# Patient Record
Sex: MALE | Race: WHITE | NOT HISPANIC OR LATINO | URBAN - METROPOLITAN AREA
[De-identification: names, ages, dates, MRNs, and addresses within clinical notes are randomized per-mention and may not be internally consistent; named-entity substitution may affect disease eponyms.]

---

## 2017-12-17 ENCOUNTER — EMERGENCY (EMERGENCY)
Facility: HOSPITAL | Age: 57
LOS: 1 days | Discharge: PSYCHIATRIC OR SUBSTANCE USE FACILITY | End: 2017-12-17
Attending: EMERGENCY MEDICINE | Admitting: INTERNAL MEDICINE
Payer: MEDICAID

## 2017-12-17 VITALS
DIASTOLIC BLOOD PRESSURE: 87 MMHG | OXYGEN SATURATION: 100 % | TEMPERATURE: 98 F | HEART RATE: 86 BPM | SYSTOLIC BLOOD PRESSURE: 155 MMHG | RESPIRATION RATE: 16 BRPM

## 2017-12-17 LAB
ALBUMIN SERPL ELPH-MCNC: 3.9 G/DL — SIGNIFICANT CHANGE UP (ref 3.3–5)
ALP SERPL-CCNC: 150 U/L — HIGH (ref 40–120)
ALT FLD-CCNC: 13 U/L — SIGNIFICANT CHANGE UP (ref 4–41)
APAP SERPL-MCNC: < 15 UG/ML — LOW (ref 15–25)
AST SERPL-CCNC: 26 U/L — SIGNIFICANT CHANGE UP (ref 4–40)
BARBITURATES MEASUREMENT: NEGATIVE — SIGNIFICANT CHANGE UP
BASOPHILS # BLD AUTO: 0.03 K/UL — SIGNIFICANT CHANGE UP (ref 0–0.2)
BASOPHILS NFR BLD AUTO: 0.3 % — SIGNIFICANT CHANGE UP (ref 0–2)
BENZODIAZ SERPL-MCNC: NEGATIVE — SIGNIFICANT CHANGE UP
BILIRUB SERPL-MCNC: 0.3 MG/DL — SIGNIFICANT CHANGE UP (ref 0.2–1.2)
BUN SERPL-MCNC: 24 MG/DL — HIGH (ref 7–23)
CALCIUM SERPL-MCNC: 8.7 MG/DL — SIGNIFICANT CHANGE UP (ref 8.4–10.5)
CHLORIDE SERPL-SCNC: 103 MMOL/L — SIGNIFICANT CHANGE UP (ref 98–107)
CK MB BLD-MCNC: 2.9 — HIGH (ref 0–2.5)
CK MB BLD-MCNC: 7.64 NG/ML — HIGH (ref 1–6.6)
CK SERPL-CCNC: 267 U/L — HIGH (ref 30–200)
CO2 SERPL-SCNC: 22 MMOL/L — SIGNIFICANT CHANGE UP (ref 22–31)
CREAT SERPL-MCNC: 0.86 MG/DL — SIGNIFICANT CHANGE UP (ref 0.5–1.3)
EOSINOPHIL # BLD AUTO: 0.3 K/UL — SIGNIFICANT CHANGE UP (ref 0–0.5)
EOSINOPHIL NFR BLD AUTO: 3.1 % — SIGNIFICANT CHANGE UP (ref 0–6)
ETHANOL BLD-MCNC: < 10 MG/DL — SIGNIFICANT CHANGE UP
GLUCOSE SERPL-MCNC: 138 MG/DL — HIGH (ref 70–99)
HCT VFR BLD CALC: 51.8 % — HIGH (ref 39–50)
HGB BLD-MCNC: 17.6 G/DL — HIGH (ref 13–17)
IMM GRANULOCYTES # BLD AUTO: 0.02 # — SIGNIFICANT CHANGE UP
IMM GRANULOCYTES NFR BLD AUTO: 0.2 % — SIGNIFICANT CHANGE UP (ref 0–1.5)
LYMPHOCYTES # BLD AUTO: 2.08 K/UL — SIGNIFICANT CHANGE UP (ref 1–3.3)
LYMPHOCYTES # BLD AUTO: 21.2 % — SIGNIFICANT CHANGE UP (ref 13–44)
MCHC RBC-ENTMCNC: 29.4 PG — SIGNIFICANT CHANGE UP (ref 27–34)
MCHC RBC-ENTMCNC: 34 % — SIGNIFICANT CHANGE UP (ref 32–36)
MCV RBC AUTO: 86.6 FL — SIGNIFICANT CHANGE UP (ref 80–100)
MONOCYTES # BLD AUTO: 0.78 K/UL — SIGNIFICANT CHANGE UP (ref 0–0.9)
MONOCYTES NFR BLD AUTO: 7.9 % — SIGNIFICANT CHANGE UP (ref 2–14)
NEUTROPHILS # BLD AUTO: 6.61 K/UL — SIGNIFICANT CHANGE UP (ref 1.8–7.4)
NEUTROPHILS NFR BLD AUTO: 67.3 % — SIGNIFICANT CHANGE UP (ref 43–77)
NRBC # FLD: 0 — SIGNIFICANT CHANGE UP
PLATELET # BLD AUTO: 301 K/UL — SIGNIFICANT CHANGE UP (ref 150–400)
PMV BLD: 9.8 FL — SIGNIFICANT CHANGE UP (ref 7–13)
POTASSIUM SERPL-MCNC: 4.3 MMOL/L — SIGNIFICANT CHANGE UP (ref 3.5–5.3)
POTASSIUM SERPL-SCNC: 4.3 MMOL/L — SIGNIFICANT CHANGE UP (ref 3.5–5.3)
PROT SERPL-MCNC: 7.2 G/DL — SIGNIFICANT CHANGE UP (ref 6–8.3)
RBC # BLD: 5.98 M/UL — HIGH (ref 4.2–5.8)
RBC # FLD: 13.2 % — SIGNIFICANT CHANGE UP (ref 10.3–14.5)
SALICYLATES SERPL-MCNC: < 5 MG/DL — LOW (ref 15–30)
SODIUM SERPL-SCNC: 142 MMOL/L — SIGNIFICANT CHANGE UP (ref 135–145)
TROPONIN T SERPL-MCNC: < 0.06 NG/ML — SIGNIFICANT CHANGE UP (ref 0–0.06)
TSH SERPL-MCNC: 7.16 UIU/ML — HIGH (ref 0.27–4.2)
WBC # BLD: 9.82 K/UL — SIGNIFICANT CHANGE UP (ref 3.8–10.5)
WBC # FLD AUTO: 9.82 K/UL — SIGNIFICANT CHANGE UP (ref 3.8–10.5)

## 2017-12-17 PROCEDURE — 99285 EMERGENCY DEPT VISIT HI MDM: CPT

## 2017-12-17 RX ORDER — HALOPERIDOL DECANOATE 100 MG/ML
5 INJECTION INTRAMUSCULAR ONCE
Qty: 0 | Refills: 0 | Status: COMPLETED | OUTPATIENT
Start: 2017-12-17 | End: 2017-12-17

## 2017-12-17 RX ORDER — SODIUM CHLORIDE 9 MG/ML
1000 INJECTION INTRAMUSCULAR; INTRAVENOUS; SUBCUTANEOUS ONCE
Qty: 0 | Refills: 0 | Status: COMPLETED | OUTPATIENT
Start: 2017-12-17 | End: 2017-12-17

## 2017-12-17 RX ADMIN — HALOPERIDOL DECANOATE 5 MILLIGRAM(S): 100 INJECTION INTRAMUSCULAR at 22:21

## 2017-12-17 RX ADMIN — Medication 2 MILLIGRAM(S): at 22:22

## 2017-12-17 RX ADMIN — SODIUM CHLORIDE 1000 MILLILITER(S): 9 INJECTION INTRAMUSCULAR; INTRAVENOUS; SUBCUTANEOUS at 22:32

## 2017-12-17 NOTE — ED PROVIDER NOTE - ATTENDING CONTRIBUTION TO CARE
Martina BRITTON- 56 Y/O M brought in by ems covered in feces and rats running all over mouse after a well check call was called. Pt was decontaminated in the ED and washed , pt is agitated and refuse to answer any questions. Pt is cursing at everyone and telling female staff that" I will rape you with broomstick" . Pt is singing songs and talking to himself when nobody is around and talking to imaginary person    pt is alert, agitated, uncooperative, active hallucinations, appears psychotic, no acute distress noted, lays calmly on his side when not approached, speaking loudly in clear words, no focal deficit noted, has long dirty nails and unkept , skin warm, dry, poor turgor    will keep on enhanced observation, give 2 mg ativna and 5 mg haldol to calm him, will check labs and then possibel move to psych , appears in psychotic episode

## 2017-12-17 NOTE — ED PROVIDER NOTE - OBJECTIVE STATEMENT
57yoM BIBEMS after neighbors called for foul smell coming from his apartment. upon arrival EMS noted feces covering the walls and rat infestation of dwelling. at that point pt was brought in for a well check. pt decontaminated rick rrival as covered in feces as well. upon initial evaluation pt appears actively psychotic, responding to internal stimuli, having active hallucinations, intermittently signing songs. when approached pt starts screaming that "I freed [place]". when attempting to obtain a history pt began screaming "you are not a doctor YOU ARE NOT A DOCTOR!" unable to obtain further hx.  pt requiring ativan and haldol for evaluation.  upon reviewing old notes pt with hx schizophrenia was a [place] pt at one time.

## 2017-12-17 NOTE — ED ADULT NURSE REASSESSMENT NOTE - NS ED NURSE REASSESS COMMENT FT1
Handoff report to LOLITA Back . Handoff report to LOLITA Back . Noted quarter size area of reddeness and swelling /abraided area  mid forehead pt st" I fell into furniture." No other obvious injury noted. Skin is otherwise intact. Pt reports" I can't walk I fall a lot. I have people come help me." MD Mack notified of above. CT pending.

## 2017-12-17 NOTE — ED PROVIDER NOTE - MEDICAL DECISION MAKING DETAILS
57M w/ acute psychotic episode. psych clearance labs, psych consult, likely admission for medical management.

## 2017-12-17 NOTE — ED ADULT NURSE REASSESSMENT NOTE - NS ED NURSE REASSESS COMMENT FT1
Pt arrives to RM 18 float LOLITA Mcgee at bedside. MD Mack at bedside. Pt intermittenly screaming then singing. Pt reported to be unkempt, living in rat infested house  deplorable conditions...911 called by neighbors. Pt arrived to ED directly to Decon room . Medicated and iv access labs obtained , EKG done. will cont to assess.  Siderails up, bed low, pt on enhances observation Anabelle PCA called to bedside.

## 2017-12-17 NOTE — ED PROVIDER NOTE - SHIFT CHANGE DETAILS
pending psych eval, pt in  physically pending psych eval, pt in  physically    08:07 Valle att: Patient signed out to me. 58yo M h/o schizophrenia p/w psychosis and multiple scalp hematomas. CT head neg ich. Ua grossly positive, ok for po keflex. Patient pending [facility] admission for psychosis.

## 2017-12-17 NOTE — ED ADULT TRIAGE NOTE - CHIEF COMPLAINT QUOTE
Pt arrives to ED via EMS from home.  Pt neighbors called 911 due to odor coming from the pt's residence.  EMS reports pt residence was filled with garbage, mold, live rats walking in residence, what appeared to be fecal matter thrown against the walls.  Pt asks to be called Sasa in triage.  Pt knows his , that he is in a hospital but unaware of date stating he has not left his residence for 7 years.  Pt complains of neck pain and numbness to bilateral feet for a few years.  Pt states people stop by and give him food.  Pt reports hx of fibromyalgia and depression.  Pt denies SI/HI.  Pt calm and cooperative in triage.  Pt requires cleaning care.  Pt arrived naked under sheet.  Charge RN called. Pt arrives to ED via EMS from home.  Pt neighbors called 911 due to odor coming from the pt's residence.  EMS reports pt residence was filled with garbage, mold, live rats walking in residence, what appeared to be fecal matter thrown against the walls.  Pt asks to be called Sasa in triage.  Pt knows his , that he is in a hospital but unaware of date stating he has not left his residence for 7 years.  Pt complains of neck pain and numbness to bilateral feet for a few years.  Pt states people stop by and give him food.  Pt reports hx of fibromyalgia and depression.  Pt denies SI/HI.  Pt calm and cooperative in triage.  Pt requires cleaning care.  Pt arrived naked under sheet.  Charge RN called.  Pt reports he is unable to stand.  Pt refuses to wear id band.  Pt now stating he is God and does not need to do anything he does not want to.  Manager in triage speaking with pt. Pt arrives to ED via EMS from home.  Pt neighbors called 911 due to odor coming from the pt's residence.  EMS reports pt residence was filled with garbage, mold, live rats walking in residence, what appeared to be fecal matter thrown against the walls.  Pt asks to be called Sasa in triage.  Pt knows his , that he is in a hospital but unaware of date stating he has not left his residence for 7 years.  Pt complains of neck pain and numbness to bilateral feet for a few years.  Pt states people stop by and give him food.  Pt reports hx of fibromyalgia and depression.  Pt denies SI/HI.  Pt calm and cooperative in triage.  Pt requires cleaning care.  Pt arrived naked under sheet.  Charge RN called.  Pt reports he is unable to stand.  Pt refuses to wear id band.  Pt now stating he is God and does not need to do anything he does not want to.  Manager in triage speaking with pt.  Pt taken to [facility] area for shower by staff.

## 2017-12-17 NOTE — ED ADULT NURSE REASSESSMENT NOTE - GENERAL PATIENT STATE
anxious/pt arrived from decon shower with security at bedside. Pt screaming  obsenties on arrival to room. then singing hallelua. Pt appears disorganized, not answering questions appropriately.
presently quiet awake following simple commands/comfortable appearance

## 2017-12-17 NOTE — ED ADULT NURSE NOTE - ED STAT RN HANDOFF DETAILS
Report given to receiving RN, aware pt does not have an IV, ok to send pt without an IV, no IV medications ordered. Pt waiting for transport to unit.

## 2017-12-17 NOTE — ED PROVIDER NOTE - PROGRESS NOTE DETAILS
Martina do to psychaitry attending, pt has prior h/o schizophrenia and [facility], unknown how he got out and is non complaint, in pure psychosis, cleared medically , will send to  for evaluation Sign out follow-up: 57M PMH of schizophrenia presents covered in feces with disorganized behavior. Pending psych evaluation for psychosis. Awaiting CT Head for r/o trauma after bump noticed. Hemoconcentration on labs. DRE. UA c/w UTI. NO fever. Keflex 500 mg BID for 7 days. DRE. Shift change #2: Medically clear. PO Abx for UTI. Pending psych admission for psychosis. Care transferred to Dr. Mansfield from Dr. Gonzalez. Shift change #2: Medically clear. PO Abx for UTI. Pending psych admission for psychosis. Care transferred to Dr. Valle from Dr. Gonzalez. 08:07 Valle att: Patient signed out to me. 56yo M h/o schizophrenia p/w psychosis and multiple scalp hematomas. CT head neg ich. Ua grossly positive, ok for po keflex. Patient pending [facility] admission for psychosis. 09:05 Notified by  attg patient refusing po keflex. Patient cannot receive IV abx at [facility], request patient admitted to [facility] where he can be declared no capacity and receive court ordered IV abx.  attg also concerned patient non-ambulatory and not aware if previously evaluated for ambulatory status. 09:38 Patient evaluated by me in . Upon introducing myself patient states, "Show me your license. You don't have it? You're not a doctor. Where did you go to school? I only see [place] doctors. I am god." When asked why he will not take keflex and if he could kindly walk patient states,"No, no, no. You're a very sweet woman but you are not a doctor. Leave before I call the ." Patient continuously refuses questions or to follow commands.  staff have not witnessed patient ambulating. Hospitalist paged for medical admission. 10:22 Tori att: Case d/w Hospitalist Parveen Eisenberg. Patient requiring medical admission for iv abx and inability to ambulate. Per Dr. Eisenberg ua "must be contaminated if house was covered in feces. Was this a straight cath?" Hospitalist aware patient was decontaminated. Per hospitalist patient can only be admitted if he receives a straight cath. 10:22 Tori att: Case d/w Hospitalist Parveen Eisenberg. Patient requiring medical admission for iv abx and inability to ambulate. Per Dr. Eisenberg ua "must be contaminated if house was covered in feces. Was this a straight cath?" Hospitalist aware patient was decontaminated. Per Hospitalist Faina patient can only be admitted if he receives a straight cath. If ua neg then admit to [facility]. Case d/w  attg,  attg to d/w Hospitalist that medical admission at least warranted for inability to ambulate. Tori att: Callback from Hospitalist Geovanna. Case d/w Dr. Austin. Admitted to medicine. No straight cath. Will send ucx.

## 2017-12-17 NOTE — ED PROVIDER NOTE - CARE PLAN
Principal Discharge DX:	Acute psychosis Principal Discharge DX:	Acute psychosis  Secondary Diagnosis:	Scalp hematoma, initial encounter Principal Discharge DX:	Acute psychosis  Secondary Diagnosis:	Scalp hematoma, initial encounter  Secondary Diagnosis:	UTI (urinary tract infection)

## 2017-12-17 NOTE — ED ADULT NURSE NOTE - OBJECTIVE STATEMENT
Pt arrives to ED via EMS from home.  Pt neighbors called 911 due to odor coming from the pt's residence.  EMS reports pt residence was filled with garbage, mold, live rats walking in residence, what appeared to be fecal matter thrown against the walls.  Pt asks to be called Sasa in triage.  Pt knows his , that he is in a hospital but unaware of date stating he has not left his residence for 7 years.  Pt complains of neck pain and numbness to bilateral feet for a few years.  Pt states people stop by and give him food.  Pt reports hx of fibromyalgia and depression.  Pt denies SI/HI.  Pt calm and cooperative in triage.  Pt requires cleaning care.  Pt arrived naked under sheet.  Charge RN called.  Pt reports he is unable to stand.  Pt refuses to wear id band.  Pt now stating he is God and does not need to do anything he does not want to.  Manager in triage speaking with pt.   The patient is a 58 y/o male alert however refusing to answer question about person, place, time and event.  The patient BIB EMS after neighbors called 911 due to odor coming from his apartment.  As per EMS the apartment had feces on the wall, the patient The patient is a 58 y/o male alert however refusing to answer question about person, place, time and event.  The patient BIB EMS after neighbors called 911 due to odor coming from his apartment.  As per EMS the apartment had feces on the wall, the patient had rats in his apartment, and his apartment was filled with garbage.  Patient denies SI/HI/AH/VH, denies pain and discomfort, otherwise minimally cooperative with interview.  Patient is refusing vital signs, agitated with staff, and verbally aggressive.  Patient appears unkempt.  The patient was received in room 18, in [facility]. MD advised, will continue to monitor. Isabela RN: The patient is a 58 y/o male alert however refusing to answer question about person, place, time and event.  The patient BIB EMS after neighbors called 911 due to odor coming from his apartment.  As per EMS the apartment had feces on the wall, the patient had rats in his apartment, and his apartment was filled with garbage.  Patient denies SI/HI/AH/VH, denies pain and discomfort, otherwise minimally cooperative with interview.  Patient is refusing vital signs at this time, agitated with staff, and verbally aggressive.  Patient appears unkempt.  The patient was received in room 18, in hospital gowns.  Will continue to monitor.

## 2017-12-17 NOTE — ED ADULT NURSE NOTE - CHIEF COMPLAINT QUOTE
Pt arrives to ED via EMS from home.  Pt neighbors called 911 due to odor coming from the pt's residence.  EMS reports pt residence was filled with garbage, mold, live rats walking in residence, what appeared to be fecal matter thrown against the walls.  Pt asks to be called Sasa in triage.  Pt knows his , that he is in a hospital but unaware of date stating he has not left his residence for 7 years.  Pt complains of neck pain and numbness to bilateral feet for a few years.  Pt states people stop by and give him food.  Pt reports hx of fibromyalgia and depression.  Pt denies SI/HI.  Pt calm and cooperative in triage.  Pt requires cleaning care.  Pt arrived naked under sheet.  Charge RN called.  Pt reports he is unable to stand.  Pt refuses to wear id band.  Pt now stating he is God and does not need to do anything he does not want to.  Manager in triage speaking with pt.

## 2017-12-17 NOTE — ED PROVIDER NOTE - PHYSICAL EXAMINATION
physical exam limited 2/2 pt uncooperativeness  card:RRR  lungs:coarse breath sounds b/l  patient awake intermittently shouting obscenities, but in  NAD . .   EXT WWP no edema no calf tenderness CV 2+DP/PT bilaterally.   neuro A&Ox0-1 (intermittently responding to name) moving all extremities spontaneously..    skin warm and dry no rash pt appears unkempt with dirty matted hair and long fingernails and toenails.   HEENT: dry mucous membranes, PERRL, EOMI

## 2017-12-18 VITALS
RESPIRATION RATE: 20 BRPM | TEMPERATURE: 98 F | DIASTOLIC BLOOD PRESSURE: 60 MMHG | HEART RATE: 68 BPM | OXYGEN SATURATION: 99 % | SYSTOLIC BLOOD PRESSURE: 140 MMHG

## 2017-12-18 DIAGNOSIS — F23 BRIEF PSYCHOTIC DISORDER: ICD-10-CM

## 2017-12-18 DIAGNOSIS — R53.81 OTHER MALAISE: ICD-10-CM

## 2017-12-18 DIAGNOSIS — I10 ESSENTIAL (PRIMARY) HYPERTENSION: ICD-10-CM

## 2017-12-18 LAB
AMPHET UR-MCNC: NEGATIVE — SIGNIFICANT CHANGE UP
APPEARANCE UR: SIGNIFICANT CHANGE UP
BACTERIA # UR AUTO: HIGH
BARBITURATES UR SCN-MCNC: NEGATIVE — SIGNIFICANT CHANGE UP
BENZODIAZ UR-MCNC: NEGATIVE — SIGNIFICANT CHANGE UP
BILIRUB UR-MCNC: NEGATIVE — SIGNIFICANT CHANGE UP
BLOOD UR QL VISUAL: HIGH
CANNABINOIDS UR-MCNC: NEGATIVE — SIGNIFICANT CHANGE UP
COCAINE METAB.OTHER UR-MCNC: NEGATIVE — SIGNIFICANT CHANGE UP
COLOR SPEC: YELLOW — SIGNIFICANT CHANGE UP
GLUCOSE UR-MCNC: NEGATIVE — SIGNIFICANT CHANGE UP
HYALINE CASTS # UR AUTO: SIGNIFICANT CHANGE UP (ref 0–?)
KETONES UR-MCNC: NEGATIVE — SIGNIFICANT CHANGE UP
LEUKOCYTE ESTERASE UR-ACNC: HIGH
METHADONE UR-MCNC: NEGATIVE — SIGNIFICANT CHANGE UP
MUCOUS THREADS # UR AUTO: SIGNIFICANT CHANGE UP
NITRITE UR-MCNC: POSITIVE — HIGH
NON-SQ EPI CELLS # UR AUTO: <1 — SIGNIFICANT CHANGE UP
OPIATES UR-MCNC: NEGATIVE — SIGNIFICANT CHANGE UP
OXYCODONE UR-MCNC: NEGATIVE — SIGNIFICANT CHANGE UP
PCP UR-MCNC: NEGATIVE — SIGNIFICANT CHANGE UP
PH UR: 7 — SIGNIFICANT CHANGE UP (ref 4.6–8)
PROT UR-MCNC: 20 MG/DL — SIGNIFICANT CHANGE UP
RBC CASTS # UR COMP ASSIST: HIGH (ref 0–?)
SP GR SPEC: 1.02 — SIGNIFICANT CHANGE UP (ref 1–1.04)
SQUAMOUS # UR AUTO: SIGNIFICANT CHANGE UP
T3 SERPL-MCNC: 135 NG/DL — SIGNIFICANT CHANGE UP (ref 80–200)
T4 AB SER-ACNC: 5.93 UG/DL — SIGNIFICANT CHANGE UP (ref 5.1–13)
T4 FREE SERPL-MCNC: 1.07 NG/DL — SIGNIFICANT CHANGE UP (ref 0.9–1.8)
UROBILINOGEN FLD QL: NORMAL MG/DL — SIGNIFICANT CHANGE UP
WBC CLUMPS #/AREA URNS HPF: PRESENT — HIGH (ref 0–?)
WBC UR QL: >50 — HIGH (ref 0–?)

## 2017-12-18 RX ORDER — HALOPERIDOL DECANOATE 100 MG/ML
5 INJECTION INTRAMUSCULAR ONCE
Qty: 0 | Refills: 0 | Status: DISCONTINUED | OUTPATIENT
Start: 2017-12-18 | End: 2017-12-18

## 2017-12-18 RX ORDER — CEPHALEXIN 500 MG
500 CAPSULE ORAL EVERY 12 HOURS
Qty: 0 | Refills: 0 | Status: DISCONTINUED | OUTPATIENT
Start: 2017-12-18 | End: 2017-12-19

## 2017-12-18 RX ORDER — ATENOLOL 25 MG/1
50 TABLET ORAL DAILY
Qty: 0 | Refills: 0 | Status: DISCONTINUED | OUTPATIENT
Start: 2017-12-18 | End: 2017-12-19

## 2017-12-18 RX ORDER — IBUPROFEN 200 MG
200 TABLET ORAL
Qty: 0 | Refills: 0 | Status: DISCONTINUED | OUTPATIENT
Start: 2017-12-18 | End: 2017-12-19

## 2017-12-18 RX ORDER — HALOPERIDOL DECANOATE 100 MG/ML
5 INJECTION INTRAMUSCULAR ONCE
Qty: 0 | Refills: 0 | Status: COMPLETED | OUTPATIENT
Start: 2017-12-18 | End: 2017-12-19

## 2017-12-18 RX ORDER — DIPHENHYDRAMINE HCL 50 MG
50 CAPSULE ORAL ONCE
Qty: 0 | Refills: 0 | Status: COMPLETED | OUTPATIENT
Start: 2017-12-18 | End: 2017-12-19

## 2017-12-18 RX ORDER — DIPHENHYDRAMINE HCL 50 MG
50 CAPSULE ORAL EVERY 6 HOURS
Qty: 0 | Refills: 0 | Status: DISCONTINUED | OUTPATIENT
Start: 2017-12-18 | End: 2017-12-19

## 2017-12-18 RX ORDER — CEPHALEXIN 500 MG
500 CAPSULE ORAL ONCE
Qty: 0 | Refills: 0 | Status: COMPLETED | OUTPATIENT
Start: 2017-12-18 | End: 2017-12-18

## 2017-12-18 RX ORDER — TAMSULOSIN HYDROCHLORIDE 0.4 MG/1
0.4 CAPSULE ORAL AT BEDTIME
Qty: 0 | Refills: 0 | Status: DISCONTINUED | OUTPATIENT
Start: 2017-12-18 | End: 2017-12-19

## 2017-12-18 RX ORDER — ARIPIPRAZOLE 15 MG/1
5 TABLET ORAL DAILY
Qty: 0 | Refills: 0 | Status: DISCONTINUED | OUTPATIENT
Start: 2017-12-18 | End: 2017-12-19

## 2017-12-18 RX ORDER — HALOPERIDOL DECANOATE 100 MG/ML
5 INJECTION INTRAMUSCULAR EVERY 6 HOURS
Qty: 0 | Refills: 0 | Status: DISCONTINUED | OUTPATIENT
Start: 2017-12-18 | End: 2017-12-19

## 2017-12-18 RX ADMIN — Medication 500 MILLIGRAM(S): at 10:47

## 2017-12-18 RX ADMIN — Medication 500 MILLIGRAM(S): at 16:54

## 2017-12-18 NOTE — ED BEHAVIORAL HEALTH ASSESSMENT NOTE - SUMMARY
Pt is a 56 y/o  man, single non caregiver, PPhx Schizoaffective disorder as per Psyckes, no reported h/o of suicidality or violence or legal trouble, multiple prior hospitalizations (per records, most recently at [facility] 8T 6/13-4/14, discharged to [facility]), PMH of htn, BPH, fibromyalgia, BIB EMS picked up from home after neighbors called 911 due to odor coming from the pt's residence after EMS reports pt residence was filled with garbage, mold, live rats walking in residence, what appeared to be fecal matter thrown against the walls.      Patient is an acute danger to self and others at this time.  Patient lacks insight and judgment into illness and remains an acute safety risk and warrants inpatient psychiatric hospitalization for safety and stabilization.

## 2017-12-18 NOTE — ED BEHAVIORAL HEALTH ASSESSMENT NOTE - DETAILS
spoke to MITALI Junior unknown information of referrant/neighbors [facility] staff aware of discharge

## 2017-12-18 NOTE — ED BEHAVIORAL HEALTH ASSESSMENT NOTE - OTHER PAST PSYCHIATRIC HISTORY (INCLUDE DETAILS REGARDING ONSET, COURSE OF ILLNESS, INPATIENT/OUTPATIENT TREATMENT)
Today patient reports last hospitalization was 8 years ago.  As per previous records, DX- Schizophrenia. IP- multiple, last at 8T in 2014. Denies SA. Denies violence. Reports he sees an outpatient psychiatrist, Dr. Pillai at [place] (per our records his case at [facility] was closed in May 2013)

## 2017-12-18 NOTE — PROGRESS NOTE BEHAVIORAL HEALTH - NSBHFUPINTERVALHXFT_PSY_A_CORE
Pt is a 54 y/o  man, single non caregiver, PPhx Schizoaffective disorder as per Psyckes, no reported h/o of suicidality or violence or legal trouble, multiple prior hospitalizations (per records, most recently at [facility] 8T 6/13-4/14, discharged to [facility]), PMH of htn, BPH, fibromyalgia, BIB EMS picked up from home after neighbors called 911 due to odor coming from the pt's residence after EMS reports pt residence was filled with garbage, mold, live rats walking in residence, what appeared to be fecal matter thrown against the walls.    No acute events overnight.  No PRNs.  Seen and examined at bedside.  Pt is disheveled and unkempt with gown only covering his groin.  Interview is limited as pt refuses to participate.  When asked why he is here, pt refused to respond.  States "I have nothing to say to you".  States "I'm a psychiatrist.  This is my hospital."  Pt holds up his hand and states "Talk to the hand.  You are talking to the hand of God."  When asked where he lives, pt replies "I live everywhere.  I was born at the beginning of time."  He only asks for medication for IBS and fibromyalgia. Pt is a 54 y/o  man, single non caregiver, PPhx Schizoaffective disorder as per Psyckes, no reported h/o of suicidality or violence or legal trouble, multiple prior hospitalizations (per records, most recently at [facility] 8T 6/13-4/14, discharged to [facility]), PMH of htn, BPH, fibromyalgia, BIB EMS picked up from home after neighbors called 911 due to odor coming from the pt's residence after EMS reports pt residence was filled with garbage, mold, live rats walking in residence, what appeared to be fecal matter thrown against the walls.    No acute events overnight.  No PRNs.  Seen and examined at bedside.  Pt is disheveled and unkempt with gown only covering his groin.  Interview is limited as pt refuses to participate.  When asked why he is here, pt refused to respond.  States "I have nothing to say to you".  States "I'm a psychiatrist.  This is my hospital."  Pt holds up his hand and states "Talk to the hand.  You are talking to the hand of God."  When asked where he lives, pt replies "I live everywhere.  I was born at the beginning of time."  He only asks for medication for IBS and fibromyalgia.  Per staff, pt talks to self continuously.

## 2017-12-18 NOTE — H&P ADULT - ASSESSMENT
58 yo man PMH schizophrenia, HTN, anxiety was brought in by EMS after the neighbors called reporting a foul smell coming from his apartment. Pt being treated empirically for UTI in case this may be contributing to current mental status however no systemic signs of infection (no fever, leukocytosis). Pt will need to be transferred to [facility] for psychiatric stabilization

## 2017-12-18 NOTE — PROGRESS NOTE BEHAVIORAL HEALTH - OTHER
unable to assess, pt refuses to participate in interview unkempt toenails unable to assess, pt laying in bed rapid illogical appears internally preoccupied

## 2017-12-18 NOTE — ED BEHAVIORAL HEALTH ASSESSMENT NOTE - HPI (INCLUDE ILLNESS QUALITY, SEVERITY, DURATION, TIMING, CONTEXT, MODIFYING FACTORS, ASSOCIATED SIGNS AND SYMPTOMS)
Pt is a 56 y/o  man, single non caregiver, PPhx Schizoaffective disorder as per Psyckes, no reported h/o of suicidality or violence or legal trouble, multiple prior hospitalizations (per records, most recently at [facility] 8T -, discharged to [facility]), PMH of htn, BPH, fibromyalgia, BIB EMS picked up from home after neighbors called 911 due to odor coming from the pt's residence.      As per triage note, EMS reports pt residence was filled with garbage, mold, live rats walking in residence, what appeared to be fecal matter thrown against the walls.  Pt asks to be called Sasa in triage.  Pt knows his , that he is in a hospital but unaware of date stating he has not left his residence for 7 years.  Pt complains of neck pain and numbness to bilateral feet for a few years.  Pt states people stop by and give him food.  Pt reports hx of fibromyalgia and depression.  Pt denies SI/HI.  Pt calm and cooperative in triage.  Pt requires cleaning care.  Pt arrived naked under sheet.  Charge RN called.  Pt reports he is unable to stand.  Pt refuses to wear id band.  Pt now stating he is God and does not need to do anything he does not want to. Additional notes from the ED provider and team  decontaminated on arrival as covered in feces as well. upon initial evaluation pt appears actively psychotic, responding to internal stimuli, having active hallucinations, intermittently signing songs. when approached pt starts screaming that "I freed [place]". when attempting to obtain a history pt began screaming "you are not a doctor YOU ARE NOT A DOCTOR!" unable to obtain further hx. pt requiring ativan and haldol for evaluation.    Upon interview patient refused to participate in interview, demanding food and water.  When asked why he presented to the ER, he states that he is disabled and cannot walk.  Patient states that he did not call 911 and does not know who did.  Patient states that he has no indication to take medications.  Reports that he has fibromyalgia and cannot walk.  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  I don't need to be here.  Just give me the phone and I can have someone come in 10-15 minutes."  When asked who he was going to call he refused to answer and again demanded food and water. Pt is a 54 y/o  man, single non caregiver, PPhx Schizoaffective disorder as per Psyckes, no reported h/o of suicidality or violence or legal trouble, multiple prior hospitalizations (per records, most recently at [facility] 8T -, discharged to [facility]), PMH of htn, BPH, fibromyalgia, BIB EMS picked up from home after neighbors called 911 due to odor coming from the pt's residence.      As per triage note, EMS reports pt residence was filled with garbage, mold, live rats walking in residence, what appeared to be fecal matter thrown against the walls.  Pt asks to be called Sasa in triage.  Pt knows his , that he is in a hospital but unaware of date stating he has not left his residence for 7 years.  Pt complains of neck pain and numbness to bilateral feet for a few years.  Pt states people stop by and give him food.  Pt reports hx of fibromyalgia and depression.  Pt denies SI/HI.  Pt calm and cooperative in triage.  Pt requires cleaning care.  Pt arrived naked under sheet.  Charge RN called.  Pt reports he is unable to stand.  Pt refuses to wear id band.  Pt now stating he is God and does not need to do anything he does not want to. Additional notes from the ED provider and team  decontaminated on arrival as covered in feces as well. upon initial evaluation pt appears actively psychotic, responding to internal stimuli, having active hallucinations, intermittently signing songs. when approached pt starts screaming that "I freed [place]". when attempting to obtain a history pt began screaming "you are not a doctor YOU ARE NOT A DOCTOR!" unable to obtain further hx. pt requiring ativan and haldol for evaluation.    Upon interview patient refused to participate in interview, demanding food and water.  When asked why he presented to the ER, he states that he is disabled and cannot walk.  Patient states that he did not call 911 and does not know who did.  Patient states that he has no indication to take medications.  Reports that he has fibromyalgia and cannot walk.  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  I don't need to be here.  Just give me the phone and I can have someone come in 10-15 minutes."  When asked who he was going to call he refused to answer and again demanded food and water.    update 10am: patient signed out to Dr. Brennan - urine obtained and pt found to have UTI, ordered and refused PO keflex. Patient also not ambulating - unable to determine if its volitional or due to some physical issue (deconditioning or weakness etc). Patient remains delusional, refusing to believe staff are doctors because we did not 'go to [place]'. After multiple repeated attempts patient agreed to take Keflex. However continues to refuse to ambulate. Per [facility] patient would likely be better served on a geriatric unit though no beds are currently available. Additionally, further urine studies are needed to determine if patient has a true UTI or it is contaminated (however sample obtained after patient was cleaned in decon room). Culture pending. Presently patient remains psychotic and delusional, he does not participate in a discussion regarding his health and risks/benefits of treatment, therefore he does not have capacity to refuse such treatment at this time.

## 2017-12-18 NOTE — H&P ADULT - PROBLEM SELECTOR PLAN 1
Psych assessement reviewed  Continue current psych meds  F/u psych recs  To be transferred to [facility] on d/c Psych assessment reviewed  Continue current psych meds  F/u psych recs  To be transferred to [facility] on d/c Psych assessment reviewed  CT head neg, serum and urine tox neg  Being treated empirically for UTI, no other signs of infection  Continue current psych meds  F/u psych recs  To be transferred to [facility] on d/c

## 2017-12-18 NOTE — ED BEHAVIORAL HEALTH ASSESSMENT NOTE - OTHER
unkempt toenails, covered in feces and naked on arrival unable to assess states he cannot walk appears internally preoccupied unable to assess

## 2017-12-18 NOTE — CHART NOTE - NSCHARTNOTEFT_GEN_A_CORE
message left for physical therapy regarding eval/consult as I was informed by Dr. Baltazar it eval is done patient may go to [facility] as per psych. Callback number provided to physical therapy

## 2017-12-18 NOTE — H&P ADULT - NSHPPHYSICALEXAM_GEN_ALL_CORE
Vital Signs Last 24 Hrs  T(C): 36.6 (18 Dec 2017 07:20), Max: 36.7 (17 Dec 2017 22:32)  T(F): 97.9 (18 Dec 2017 07:20), Max: 98 (17 Dec 2017 22:32)  HR: 87 (18 Dec 2017 07:20) (86 - 106)  BP: 152/83 (18 Dec 2017 07:20) (152/83 - 161/91)  BP(mean): --  RR: 16 (18 Dec 2017 07:20) (16 - 18)  SpO2: 100% (18 Dec 2017 07:20) (98% - 100%)    GENERAL: Well-developed, well nourished, No acute distress  HEAD:  Atraumatic, Normocephalic  EYES: EOMI, PERRLA, conjunctiva and sclera clear  NECK: Supple, No JVD, no LAD  CHEST/LUNG: Clear to auscultation bilaterally; no wheezes, rhonchi, or rales  HEART: S1/S2, Regular rate and rhythm; no murmurs, rubs, or gallops  ABDOMEN: Nondistended, NABS, soft,  nontender, no organomegaly, no peritoneal signs  EXTREMITIES:  2+ Peripheral Pulses, no clubbing, cyanosis, or edema  PSYCH: AAOx3, normal affect  NEUROLOGY: CN II-XII intact, 5/5 strength in upper and lower extremities  SKIN: No rashes or lesions Vital Signs Last 24 Hrs  T(C): 36.6 (18 Dec 2017 07:20), Max: 36.7 (17 Dec 2017 22:32)  T(F): 97.9 (18 Dec 2017 07:20), Max: 98 (17 Dec 2017 22:32)  HR: 87 (18 Dec 2017 07:20) (86 - 106)  BP: 152/83 (18 Dec 2017 07:20) (152/83 - 161/91)  BP(mean): --  RR: 16 (18 Dec 2017 07:20) (16 - 18)  SpO2: 100% (18 Dec 2017 07:20) (98% - 100%)    GENERAL: Thin, large beard, unkempt, lying comfortably in bed talking to himself  and " God" loudly  HEENT: EOMI, PERRLA, conjunctiva and sclera clear  CHEST/LUNG: Clear to auscultation bilaterally  HEART: S1/S2, Regular rate and rhythm  ABDOMEN: Refused exam   EXTREMITIES:  no clubbing, cyanosis, or edema  PSYCH: AAOx1, agitated  NEUROLOGY: 4-5/5 strength in RUE, LUE, LLE, refusing to test RLE because the voice in his head said so  SKIN: no sacral ulcers

## 2017-12-18 NOTE — ED BEHAVIORAL HEALTH ASSESSMENT NOTE - PSYCHIATRIC ISSUES AND PLAN (INCLUDE STANDING AND PRN MEDICATION)
PRNs, Haldol 5mg IM/PO, Ativan 2mg IM/PO, Benadryl 50mg IM/PO q6hrs. Patient was started on Abilify on previous [facility] inpatient admission titrated to 10mg

## 2017-12-18 NOTE — ED BEHAVIORAL HEALTH ASSESSMENT NOTE - DESCRIPTION
irritable, paranoid, demanding  Vital Signs Last 24 Hrs  T(C): 36.5 (17 Dec 2017 23:11), Max: 36.7 (17 Dec 2017 22:32)  T(F): 97.7 (17 Dec 2017 23:11), Max: 98 (17 Dec 2017 22:32)  HR: 102 (17 Dec 2017 23:11) (86 - 106)  BP: 160/97 (17 Dec 2017 23:11) (155/87 - 161/91)  BP(mean): --  RR: 16 (17 Dec 2017 23:11) (16 - 18)  SpO2: 98% (17 Dec 2017 23:11) (98% - 100%) htn, bph, arthritis unable to assess

## 2017-12-18 NOTE — ED BEHAVIORAL HEALTH ASSESSMENT NOTE - NS ED BHA PLAN MSU PSYCHIATRIC ISSUES2 FT
PATIENT DOES NOT HAVE CAPACITY TO REFUSE MEDICATIONS FOR UTI OR BLOOD PRESSURE. PRNs, Haldol 5mg IM/PO, Ativan 2mg IM/PO, Benadryl 50mg IM/PO q6hrs. Patient was started on Abilify on previous [facility] inpatient admission titrated to 10mg

## 2017-12-18 NOTE — H&P ADULT - HISTORY OF PRESENT ILLNESS
58 yo man PMH schizophrenia, HTN, anxiety was brought in by EMS after the neighbors called reporting a foul smell coming from his apartment. EMS found feces and rat infestation of his home and pt was decontaminated on arrival. In the ED, pt was seen responding to internal stimuli, hallucinating and behaving inappropriately.      In the ED, pt was afebrile, slightly hypertensive, RR 16, saturating % on room air. He was given ativan and haldol. 56 yo man PMH schizophrenia, HTN, anxiety was brought in by EMS after the neighbors called reporting a foul smell coming from his apartment. EMS found feces and rat infestation of his home and pt was decontaminated on arrival. In the ED, pt was seen responding to internal stimuli, hallucinating and behaving inappropriately.      In the ED, pt was afebrile, slightly hypertensive, RR 16, saturating % on room air. He was given ativan and haldol. UA + so pt given kefflex. Initially refused but took one dose prior to medicine admission. 56 yo man PMH schizophrenia, HTN, anxiety was brought in by EMS after the neighbors called reporting a foul smell coming from his apartment. EMS found feces and rat infestation of his home and pt was decontaminated on arrival.    In the ED, pt was afebrile, slightly hypertensive, RR 16, saturating % on room air.  In the ED, pt was seen responding to internal stimuli, hallucinating and behaving inappropriately.  He was given ativan and haldol. UA + so pt given cephalexin. Initially refused but took one dose prior to medicine admission.     On my evaluation, pt was talking aloud to "No"  He told me "You are not a doctor and shouldn't lie to God." He requested to see my license and instead speak to a male doctor or . Reports he has sporadic pain throughout his body because of fibromyalgia. Denied falling. Said he was unable to lift his R leg due to the voice in his head. Reports he is weak so can't walk and needs klonopin for anxiety and medication for his fibromylagia. Pt refused to sit up in the stretcher. After much coaxing turned on his side with the use of his arms and legs.

## 2017-12-18 NOTE — H&P ADULT - PROBLEM SELECTOR PLAN 3
Start atenolol - pt was on this in the past  Monitor BP and adjust as needed Monitor BP, possibly also elevated due to agitation  Start atenolol, can adjust as needed Monitor BP, possibly also elevated due to agitation  Start atenolol was on this in the past  Adjust meds prn

## 2017-12-19 ENCOUNTER — TRANSCRIPTION ENCOUNTER (OUTPATIENT)
Age: 57
End: 2017-12-19

## 2017-12-19 ENCOUNTER — INPATIENT (INPATIENT)
Facility: HOSPITAL | Age: 57
LOS: 43 days | Discharge: PSYCHIATRIC OR SUBSTANCE USE FACILITY | End: 2018-02-01
Attending: STUDENT IN AN ORGANIZED HEALTH CARE EDUCATION/TRAINING PROGRAM | Admitting: PSYCHIATRY & NEUROLOGY
Payer: MEDICAID

## 2017-12-19 VITALS — OXYGEN SATURATION: 98 % | RESPIRATION RATE: 18 BRPM | WEIGHT: 174.17 LBS | HEIGHT: 69 IN

## 2017-12-19 DIAGNOSIS — F20.5 RESIDUAL SCHIZOPHRENIA: ICD-10-CM

## 2017-12-19 DIAGNOSIS — N39.0 URINARY TRACT INFECTION, SITE NOT SPECIFIED: ICD-10-CM

## 2017-12-19 PROCEDURE — 90792 PSYCH DIAG EVAL W/MED SRVCS: CPT

## 2017-12-19 RX ORDER — HALOPERIDOL DECANOATE 100 MG/ML
5 INJECTION INTRAMUSCULAR ONCE
Qty: 0 | Refills: 0 | Status: DISCONTINUED | OUTPATIENT
Start: 2017-12-19 | End: 2018-02-01

## 2017-12-19 RX ORDER — ATENOLOL 25 MG/1
1 TABLET ORAL
Qty: 0 | Refills: 0 | COMMUNITY
Start: 2017-12-19

## 2017-12-19 RX ORDER — IBUPROFEN 200 MG
1 TABLET ORAL
Qty: 0 | Refills: 0 | COMMUNITY
Start: 2017-12-19

## 2017-12-19 RX ORDER — HALOPERIDOL DECANOATE 100 MG/ML
5 INJECTION INTRAMUSCULAR EVERY 6 HOURS
Qty: 0 | Refills: 0 | Status: DISCONTINUED | OUTPATIENT
Start: 2017-12-19 | End: 2018-02-01

## 2017-12-19 RX ORDER — ATENOLOL 25 MG/1
50 TABLET ORAL DAILY
Qty: 0 | Refills: 0 | Status: DISCONTINUED | OUTPATIENT
Start: 2017-12-19 | End: 2018-02-01

## 2017-12-19 RX ORDER — DIPHENHYDRAMINE HCL 50 MG
50 CAPSULE ORAL EVERY 6 HOURS
Qty: 0 | Refills: 0 | Status: DISCONTINUED | OUTPATIENT
Start: 2017-12-19 | End: 2018-02-01

## 2017-12-19 RX ORDER — DIPHENHYDRAMINE HCL 50 MG
1 CAPSULE ORAL
Qty: 0 | Refills: 0 | COMMUNITY
Start: 2017-12-19

## 2017-12-19 RX ORDER — DIPHENHYDRAMINE HCL 50 MG
50 CAPSULE ORAL ONCE
Qty: 0 | Refills: 0 | Status: DISCONTINUED | OUTPATIENT
Start: 2017-12-19 | End: 2018-02-01

## 2017-12-19 RX ORDER — TAMSULOSIN HYDROCHLORIDE 0.4 MG/1
0.4 CAPSULE ORAL AT BEDTIME
Qty: 0 | Refills: 0 | Status: DISCONTINUED | OUTPATIENT
Start: 2017-12-19 | End: 2017-12-28

## 2017-12-19 RX ORDER — ARIPIPRAZOLE 15 MG/1
1 TABLET ORAL
Qty: 0 | Refills: 0 | COMMUNITY
Start: 2017-12-19

## 2017-12-19 RX ORDER — CEPHALEXIN 500 MG
500 CAPSULE ORAL EVERY 12 HOURS
Qty: 0 | Refills: 0 | Status: COMPLETED | OUTPATIENT
Start: 2017-12-19 | End: 2017-12-25

## 2017-12-19 RX ORDER — IBUPROFEN 200 MG
200 TABLET ORAL
Qty: 0 | Refills: 0 | Status: DISCONTINUED | OUTPATIENT
Start: 2017-12-19 | End: 2017-12-20

## 2017-12-19 RX ORDER — ARIPIPRAZOLE 15 MG/1
5 TABLET ORAL DAILY
Qty: 0 | Refills: 0 | Status: DISCONTINUED | OUTPATIENT
Start: 2017-12-19 | End: 2017-12-20

## 2017-12-19 RX ORDER — HALOPERIDOL DECANOATE 100 MG/ML
1 INJECTION INTRAMUSCULAR
Qty: 0 | Refills: 0 | COMMUNITY
Start: 2017-12-19

## 2017-12-19 RX ORDER — TAMSULOSIN HYDROCHLORIDE 0.4 MG/1
1 CAPSULE ORAL
Qty: 0 | Refills: 0 | COMMUNITY
Start: 2017-12-19

## 2017-12-19 RX ORDER — ATENOLOL 25 MG/1
2 TABLET ORAL
Qty: 0 | Refills: 0 | COMMUNITY
Start: 2017-12-19

## 2017-12-19 RX ORDER — CEPHALEXIN 500 MG
1 CAPSULE ORAL
Qty: 0 | Refills: 0 | COMMUNITY
Start: 2017-12-19

## 2017-12-19 RX ADMIN — Medication 200 MILLIGRAM(S): at 16:09

## 2017-12-19 RX ADMIN — Medication 200 MILLIGRAM(S): at 15:10

## 2017-12-19 RX ADMIN — Medication 500 MILLIGRAM(S): at 23:58

## 2017-12-19 RX ADMIN — Medication 50 MILLIGRAM(S): at 23:58

## 2017-12-19 RX ADMIN — ATENOLOL 50 MILLIGRAM(S): 25 TABLET ORAL at 23:58

## 2017-12-19 RX ADMIN — Medication 50 MILLIGRAM(S): at 09:53

## 2017-12-19 RX ADMIN — Medication 200 MILLIGRAM(S): at 23:59

## 2017-12-19 RX ADMIN — Medication 2 MILLIGRAM(S): at 23:59

## 2017-12-19 RX ADMIN — HALOPERIDOL DECANOATE 5 MILLIGRAM(S): 100 INJECTION INTRAMUSCULAR at 23:59

## 2017-12-19 RX ADMIN — HALOPERIDOL DECANOATE 5 MILLIGRAM(S): 100 INJECTION INTRAMUSCULAR at 09:53

## 2017-12-19 RX ADMIN — Medication 2 MILLIGRAM(S): at 09:54

## 2017-12-19 NOTE — PROGRESS NOTE ADULT - SUBJECTIVE AND OBJECTIVE BOX
Patient is a 57y old  Male who presents with a chief complaint of brought in by EMS after neighbors called for foul smell coming from his apartment (19 Dec 2017 09:27)      SUBJECTIVE / OVERNIGHT EVENTS:  Pt was very agitated this morning, received haldol / ativan / benadryl x1. Now pt is sedated     MEDICATIONS  (STANDING):  ARIPiprazole 5 milliGRAM(s) Oral daily  ATENolol  Tablet 50 milliGRAM(s) Oral daily  cephalexin 500 milliGRAM(s) Oral every 12 hours  tamsulosin 0.4 milliGRAM(s) Oral at bedtime    MEDICATIONS  (PRN):  diphenhydrAMINE   Capsule 50 milliGRAM(s) Oral every 6 hours PRN agitation  haloperidol     Tablet 5 milliGRAM(s) Oral every 6 hours PRN agitation  ibuprofen  Tablet 200 milliGRAM(s) Oral four times a day PRN back pain  LORazepam     Tablet 2 milliGRAM(s) Oral every 6 hours PRN Agitation      T(C): 36.4 (17 @ 19:36), Max: 36.5 (17 @ 13:33)  HR: 68 (17 @ 19:36) (68 - 87)  BP: 140/60 (17 @ 19:36) (140/60 - 159/92)  RR: 20 (17 @ 19:36) (18 - 20)  SpO2: 99% (17 @ 19:36) (99% - 100%)  CAPILLARY BLOOD GLUCOSE        I&O's Summary      PHYSICAL EXAM:  GENERAL: NAD  HEAD:  Atraumatic, Normocephalic  NECK: No JVD  CHEST/LUNG: Clear to auscultation bilaterally; No wheeze  HEART: s1 s2, regular rhythm and rate   ABDOMEN: Soft, Nondistended; Bowel sounds present  EXTREMITIES:  2+ Peripheral Pulses, No clubbing, cyanosis, or edema  PSYCH: sedated   SKIN: No rashes or lesions    LABS:                        17.6   9.82  )-----------( 301      ( 17 Dec 2017 22:30 )             51.8         142  |  103  |  24<H>  ----------------------------<  138<H>  4.3   |  22  |  0.86    Ca    8.7      17 Dec 2017 22:30    TPro  7.2  /  Alb  3.9  /  TBili  0.3  /  DBili  x   /  AST  26  /  ALT  13  /  AlkPhos  150<H>  12-17      CARDIAC MARKERS ( 17 Dec 2017 22:30 )  x     / < 0.06 ng/mL / 267 u/L / 7.64 ng/mL / x          Urinalysis Basic - ( 18 Dec 2017 05:30 )    Color: YELLOW / Appearance: HAZY / S.021 / pH: 7.0  Gluc: NEGATIVE / Ketone: NEGATIVE  / Bili: NEGATIVE / Urobili: NORMAL mg/dL   Blood: SMALL / Protein: 20 mg/dL / Nitrite: POSITIVE   Leuk Esterase: LARGE / RBC: 5-10 / WBC >50   Sq Epi: OCC / Non Sq Epi: x / Bacteria: MANY        RADIOLOGY & ADDITIONAL TESTS:    Imaging Personally Reviewed:    Consultant(s) Notes Reviewed:      Care Discussed with Consultants/Other Providers: Dr. Ferguson regarding inpatient psych management

## 2017-12-19 NOTE — PROGRESS NOTE BEHAVIORAL HEALTH - OTHER
illogical unable to assess, pt refuses to participate in interview unkempt toenails unable to assess, pt laying in bed rapid

## 2017-12-19 NOTE — PROGRESS NOTE BEHAVIORAL HEALTH - SUMMARY
Pt is a 56 y/o  man, single non caregiver, PPhx Schizoaffective disorder as per Psyckes, no reported h/o of suicidality or violence or legal trouble, multiple prior hospitalizations (per records, most recently at [facility] 8T 6/13-4/14, discharged to [facility]), PMH of htn, BPH, fibromyalgia, BIB EMS picked up from home after neighbors called 911 due to odor coming from the pt's residence after EMS reports pt residence was filled with garbage, mold, live rats walking in residence, what appeared to be fecal matter thrown against the walls.  On exam, pt remains disorganized with tangential thought process and loosening of associations as well as delusions of persecution and auditory hallucinations.  He lacks insight and and judgment into illness and remains an acute safety risk and warrants inpatient psychiatric hospitalization for safety and stabilization.  2PC in chart, needs to be completed by medicine attending.    1.)  Schizophrenia vs schizoaffective d/o - c/w Abilify 5mg daily.  2.) PRNs:  Haldol 5mg PO/IV/IM q6h PRN agitation; Ativan 2mg PO/IV/IM q6h PRN agitation; Benadryl 50mg PO/IV/IM q6h PRN agitation  3.)  Will require transfer to [facility] once medically stable.

## 2017-12-19 NOTE — PROGRESS NOTE BEHAVIORAL HEALTH - NSBHCHARTREVIEWLAB_PSY_A_CORE FT
17.6   9.82  )-----------( 301      ( 17 Dec 2017 22:30 )             51.8   12-17    142  |  103  |  24<H>  ----------------------------<  138<H>  4.3   |  22  |  0.86    Ca    8.7      17 Dec 2017 22:30    TPro  7.2  /  Alb  3.9  /  TBili  0.3  /  DBili  x   /  AST  26  /  ALT  13  /  AlkPhos  150<H>  12-17      Urinalysis (12.18.17 @ 05:30)    Color: YELLOW    Urine Appearance: HAZY    Glucose: NEGATIVE: Glucose is reported in mg/dL.  Negative is defined as < 0.03 mg/dL.    Bilirubin: NEGATIVE    Ketone - Urine: NEGATIVE    Specific Gravity: 1.021    Blood: SMALL    pH - Urine: 7.0    Protein, Urine: 20 mg/dL    Urobilinogen: NORMAL mg/dL    Nitrite: POSITIVE    Leukocyte Esterase Concentration: LARGE    Red Blood Cell - Urine: 5-10    White Blood Cell - Urine: >50    Hyaline Casts: 2-5    Mucus: FEW    White Blood Cell Clump: PRESENT    Bacteria: MANY    Squamous Epithelial: OCC    Non-Squamous Epithelial: <1
17.6   9.82  )-----------( 301      ( 17 Dec 2017 22:30 )             51.8       12-17    142  |  103  |  24<H>  ----------------------------<  138<H>  4.3   |  22  |  0.86    Ca    8.7      17 Dec 2017 22:30    TPro  7.2  /  Alb  3.9  /  TBili  0.3  /  DBili  x   /  AST  26  /  ALT  13  /  AlkPhos  150<H>  12-17    Urinalysis (12.18.17 @ 05:30)    Color: YELLOW    Urine Appearance: HAZY    Glucose: NEGATIVE: Glucose is reported in mg/dL.  Negative is defined as < 0.03 mg/dL.    Bilirubin: NEGATIVE    Ketone - Urine: NEGATIVE    Specific Gravity: 1.021    Blood: SMALL    pH - Urine: 7.0    Protein, Urine: 20 mg/dL    Urobilinogen: NORMAL mg/dL    Nitrite: POSITIVE    Leukocyte Esterase Concentration: LARGE    Red Blood Cell - Urine: 5-10    White Blood Cell - Urine: >50    Hyaline Casts: 2-5    Mucus: FEW    White Blood Cell Clump: PRESENT    Bacteria: MANY    Squamous Epithelial: OCC    Non-Squamous Epithelial: <1

## 2017-12-19 NOTE — PROGRESS NOTE ADULT - PROBLEM SELECTOR PLAN 3
Monitor BP, possibly also elevated due to agitation  Start atenolol was on this in the past  Adjust meds prn

## 2017-12-19 NOTE — DISCHARGE NOTE ADULT - CARE PROVIDER_API CALL
[facility],   provider at [facility]  Phone: (   )    -  Fax: (   )    -

## 2017-12-19 NOTE — DISCHARGE NOTE ADULT - HOSPITAL COURSE
56 yo man PMH schizophrenia, HTN, anxiety was brought in by EMS after the neighbors called reporting a foul smell coming from his apartment. Pt being treated empirically for UTI in case this may be contributing to current mental status, no systemic signs of infection Pt will need to be transferred to [facility] for psychiatric stabilization  Psychiatric evaluation he remains disorganized with tangential thought process and loosening of associations as well as delusions of persecution and auditory hallucinations.  He lacks insight and and judgment into illness and remains an acute safety risk and warrants inpatient psychiatric hospitalization for safety and stabilization.    stable for transfer to [facility] 56 yo man [facility] schizophrenia, HTN, anxiety was brought in by EMS after the neighbors called reporting a foul smell coming from his apartment. Pt being treated empirically for UTI in case this may be contributing to current mental status, no systemic signs of infection Pt will need to be transferred to [facility] for psychiatric stabilization  Psychiatric evaluation he remains disorganized with tangential thought process and loosening of associations as well as delusions of persecution and auditory hallucinations.  He lacks insight and and judgment into illness and remains an acute safety risk and warrants inpatient psychiatric hospitalization for safety and stabilization.  Patient to have toenails clipped by podiatry at [facility].   stable for transfer to [facility] 56 yo man [facility] schizophrenia, HTN, anxiety was brought in by EMS after the neighbors called reporting a foul smell coming from his apartment.  Pt being treated empirically for UTI in case this may be contributing to current mental status, no systemic signs of infection Pt will need to be transferred to [facility] for psychiatric stabilization  Psychiatric evaluation he remains disorganized with tangential thought process and loosening of associations as well as delusions of persecution and auditory hallucinations.  He lacks insight and and judgment into illness and remains an acute safety risk and warrants inpatient psychiatric hospitalization for safety and stabilization.  Patient to have toenails clipped by podiatry at [facility].   stable for transfer to [facility]

## 2017-12-19 NOTE — PROGRESS NOTE BEHAVIORAL HEALTH - NSBHFUPINTERVALHXFT_PSY_A_CORE
No acute events overnight.  No PRNs.  Seen and examined at bedside.  Pt is disheveled and unkempt with gown only covering his groin.  He is noted to be talking to himself.  Interview is limited as pt refuses to participate and with marked loosening of associations and tangential thought process.  States he is the "ultimate psychiatrist" and demanded that writer show her license.  States that there are cameras everywhere and that he was told that writer is going to [facility].  When asked orientation questions, he states location as "hell" and refused to participate further.

## 2017-12-19 NOTE — PROGRESS NOTE BEHAVIORAL HEALTH - NSBHCONSULTMEDAGITATION_PSY_A_CORE FT
Haldol 5mg PO/IV/IM q6h PRN agitation  Ativan 2mg PO/IV/IM q6h PRN agitation  Benadryl 50mg PO/IV/IM q6h PRN agitation
Haldol 5mg PO/IV/IM q6h PRN agitation  Ativan 2mg PO/IV/IM q6h PRN agitation  Benadryl 50mg PO/IV/IM q6h PRN agitation

## 2017-12-19 NOTE — DISCHARGE NOTE ADULT - PROVIDER TOKENS
FREE:[LAST:[[facility]],PHONE:[(   )    -],FAX:[(   )    -],ADDRESS:[provider at [facility]]]

## 2017-12-19 NOTE — DISCHARGE NOTE ADULT - CARE PLAN
Principal Discharge DX:	Acute psychosis  Goal:	follow program at [facility]  Instructions for follow-up, activity and diet:	Follow up with providers at [facility]

## 2017-12-19 NOTE — PROGRESS NOTE BEHAVIORAL HEALTH - ORIENTATION OTHER
unable to assess, pt refuses to participate in interview
unable to assess, pt refuses to participate in interview

## 2017-12-19 NOTE — PROGRESS NOTE ADULT - ATTENDING COMMENTS
long toe nails. Need to be trimmed at [facility]. Pt medical stable. d/c to [facility]. Time 40 min

## 2017-12-19 NOTE — DISCHARGE NOTE ADULT - ADDITIONAL INSTRUCTIONS
Patient to have toe nails clipped by Podiatry Attending Dr. Gonzales at [facility]. Please place consult at [facility] and Podiatry attending will come see patient

## 2017-12-19 NOTE — PROGRESS NOTE ADULT - PROBLEM SELECTOR PLAN 2
Continue current abx  unable to send urine cx due to pt MS/agitation/uncooperative   Pt threw urinal at the staff

## 2017-12-19 NOTE — DISCHARGE NOTE ADULT - PATIENT PORTAL LINK FT
“You can access the [facility] Patient Portal, offered by [facility], by registering with the following website: [email or web address]”

## 2017-12-19 NOTE — PROGRESS NOTE BEHAVIORAL HEALTH - CASE SUMMARY
Pt seen/evaluated in follow-up; pt is a 56 y/o male with h/o Schizoaffective disorder as per Psyckes, multiple prior hospitalizations (per records, most recently at [facility] 8T 6/13-4/14, discharged to [facility]), PMH of htn, BPH, BIB EMS picked up from home after neighbors called 911 and his residence was filled with garbage, mold, and live rats. Pt is currently disorganized, psychotic with active hallucinations and paranoia and requires acute inpatient psych admission for decompensated psychosis for further stabilization and treatment. Impression: chronic schizoaffective d/o with acute exacerbation, likely in the setting of antipsychotic non-compliance. Plan- as above- transfer on 2PC status to [facility] unit 2N for inability to care for himself, for further treatment. Need further collateral for this patient.

## 2017-12-19 NOTE — DISCHARGE NOTE ADULT - MEDICATION SUMMARY - MEDICATIONS TO STOP TAKING
I will STOP taking the medications listed below when I get home from the hospital:    divalproex sodium 500 mg oral delayed release tablet  -- 3 tab(s) by mouth once a day (at bedtime)    divalproex sodium 500 mg oral delayed release tablet  -- 2 tab(s) by mouth once a day    risperiDONE 3 mg oral tablet  -- 1 tab(s) by mouth 2 times a day    dicyclomine 10 mg oral capsule  -- 2 cap(s) by mouth 4 times a day (before meals and at bedtime)

## 2017-12-19 NOTE — PROGRESS NOTE BEHAVIORAL HEALTH - NSBHCONSULTFOLLOWDETAILS_PSY_A_CORE FT
Transfer to [facility] when medically cleared.
Transfer to [facility] when medically cleared.

## 2017-12-19 NOTE — PROGRESS NOTE BEHAVIORAL HEALTH - NSBHCHARTREVIEWIMAGING_PSY_A_CORE FT
< from: CT Head No Cont (12.18.17 @ 01:39) >      IMPRESSION:  Small scalp hematomas as above. No acute intracranial hemorrhage or   calvarial fracture.    < end of copied text >
< from: CT Head No Cont (12.18.17 @ 01:39) >      IMPRESSION:  Small scalp hematomas as above. No acute intracranial hemorrhage or   calvarial fracture.    < end of copied text >

## 2017-12-19 NOTE — PROGRESS NOTE BEHAVIORAL HEALTH - NSBHCHARTREVIEWVS_PSY_A_CORE FT
Vital Signs Last 24 Hrs  T(C): 36.5 (18 Dec 2017 13:33), Max: 36.8 (18 Dec 2017 11:47)  T(F): 97.7 (18 Dec 2017 13:33), Max: 98.3 (18 Dec 2017 11:47)  HR: 87 (18 Dec 2017 13:33) (83 - 106)  BP: 159/92 (18 Dec 2017 13:33) (150/97 - 161/91)  BP(mean): --  RR: 18 (18 Dec 2017 13:33) (16 - 18)  SpO2: 100% (18 Dec 2017 13:33) (98% - 100%)
Vital Signs Last 24 Hrs  T(C): 36.4 (18 Dec 2017 19:36), Max: 36.8 (18 Dec 2017 11:47)  T(F): 97.6 (18 Dec 2017 19:36), Max: 98.3 (18 Dec 2017 11:47)  HR: 68 (18 Dec 2017 19:36) (68 - 87)  BP: 140/60 (18 Dec 2017 19:36) (140/60 - 159/92)  BP(mean): --  RR: 20 (18 Dec 2017 19:36) (16 - 20)  SpO2: 99% (18 Dec 2017 19:36) (99% - 100%)

## 2017-12-19 NOTE — DISCHARGE NOTE ADULT - MEDICATION SUMMARY - MEDICATIONS TO TAKE
I will START or STAY ON the medications listed below when I get home from the hospital:    ibuprofen 200 mg oral tablet  -- 1 tab(s) by mouth 4 times a day, As needed, back pain  -- Indication: For pain    tamsulosin 0.4 mg oral capsule  -- 1 cap(s) by mouth once a day (at bedtime)  -- Indication: For Bph    diphenhydrAMINE 50 mg oral capsule  -- 1 cap(s) by mouth every 6 hours, As needed, agitation  -- Indication: For Acute psychosis    haloperidol 5 mg oral tablet  -- 1 tab(s) by mouth every 6 hours, As needed, agitation  -- Indication: For Acute psychosis    ARIPiprazole 5 mg oral tablet  -- 1 tab(s) by mouth once a day  -- Indication: For Acute psychosis    atenolol 50 mg oral tablet  -- 1 tab(s) by mouth once a day  -- Indication: For Hypertension, unspecified type    cephalexin 500 mg oral capsule  -- 1 cap(s) by mouth every 12 hours x 7 days  -- Indication: For Urinary tract infection without hematuria, site unspecified

## 2017-12-19 NOTE — CHART NOTE - NSCHARTNOTEFT_GEN_A_CORE
podiatry consulted; made aware that podiatry attending clip toenail, resident to reach out to attending that does go to [facility] and call back ADS

## 2017-12-19 NOTE — PROGRESS NOTE ADULT - PROBLEM SELECTOR PLAN 1
Psych assessment reviewed  CT head neg, serum and urine tox neg  Being treated empirically for UTI, no other signs of infection  Continue current psych meds  F/u psych recs  To be transferred to [facility]

## 2017-12-19 NOTE — PROGRESS NOTE ADULT - ASSESSMENT
58 yo man PMH schizophrenia, HTN, anxiety was brought in by EMS after the neighbors called reporting a foul smell coming from his apartment. Pt being treated empirically for UTI in case this may be contributing to current mental status however no systemic signs of infection (no fever, leukocytosis). Pt will need to be transferred to [facility] for psychiatric stabilization

## 2017-12-20 PROCEDURE — 99232 SBSQ HOSP IP/OBS MODERATE 35: CPT

## 2017-12-20 PROCEDURE — 99223 1ST HOSP IP/OBS HIGH 75: CPT

## 2017-12-20 RX ORDER — RIVAROXABAN 15 MG-20MG
15 KIT ORAL
Qty: 0 | Refills: 0 | Status: DISCONTINUED | OUTPATIENT
Start: 2017-12-20 | End: 2017-12-20

## 2017-12-20 RX ORDER — RIVAROXABAN 15 MG-20MG
10 KIT ORAL DAILY
Qty: 0 | Refills: 0 | Status: DISCONTINUED | OUTPATIENT
Start: 2017-12-20 | End: 2018-01-09

## 2017-12-20 RX ORDER — IBUPROFEN 200 MG
600 TABLET ORAL EVERY 6 HOURS
Qty: 0 | Refills: 0 | Status: DISCONTINUED | OUTPATIENT
Start: 2017-12-20 | End: 2018-02-01

## 2017-12-20 RX ORDER — OLANZAPINE 15 MG/1
5 TABLET, FILM COATED ORAL AT BEDTIME
Qty: 0 | Refills: 0 | Status: DISCONTINUED | OUTPATIENT
Start: 2017-12-20 | End: 2017-12-22

## 2017-12-20 RX ADMIN — Medication 200 MILLIGRAM(S): at 04:39

## 2017-12-20 RX ADMIN — ATENOLOL 50 MILLIGRAM(S): 25 TABLET ORAL at 09:07

## 2017-12-20 RX ADMIN — TAMSULOSIN HYDROCHLORIDE 0.4 MILLIGRAM(S): 0.4 CAPSULE ORAL at 21:20

## 2017-12-20 RX ADMIN — ARIPIPRAZOLE 5 MILLIGRAM(S): 15 TABLET ORAL at 09:07

## 2017-12-20 RX ADMIN — OLANZAPINE 5 MILLIGRAM(S): 15 TABLET, FILM COATED ORAL at 21:20

## 2017-12-20 NOTE — CONSULT NOTE ADULT - ASSESSMENT
57M schizophrenia, with HTN, long toenails, subclinical hypothyroidism    Plan:  HTN: continue atenolol, monitor BP.  Should be screening for DM/HLD with HbA1c, lipid profile.    Toenails: podiatry consult for toenail clipping    Subclinical hypothyroidism: small elevation in TSH with normal thyroid hormone levels.  Check TFTs in 3 months, no treatment indicated at present.    Schizophrenia: management per primary team. 57M schizophrenia, with HTN, long toenails, subclinical hypothyroidism, UTI    Plan:  HTN: continue atenolol, monitor BP.  Should be screening for DM/HLD with HbA1c, lipid profile.    Toenails: podiatry consult for toenail clipping    UTI: on empiric keflex x 7 days, would monitor for sx and send urine culture when possible.    Subclinical hypothyroidism: small elevation in TSH with normal thyroid hormone levels.  Check TFTs in 3 months, no treatment indicated at present.    Schizophrenia: management per primary team. 57M schizophrenia, with HTN, long toenails, subclinical hypothyroidism, UTI    Plan:  HTN: continue atenolol, monitor BP.  Should be screening for DM/HLD with HbA1c, lipid profile.    Toenails: podiatry consult for toenail clipping    UTI: on empiric keflex x 7 days, would monitor for sx and send urine culture when possible.    Subclinical hypothyroidism: small elevation in TSH with normal thyroid hormone levels.  Check TFTs in 3 months, no treatment indicated at present.    Weakness: PT eval.  Will send B12 level, start multivitamin.    Schizophrenia: management per primary team.

## 2017-12-20 NOTE — CONSULT NOTE ADULT - SUBJECTIVE AND OBJECTIVE BOX
HPI: Pt is 57M admitted to [facility] 12/18/17 after he was found in squalid apartment.  He was treated empirically for UTI but no urine culture was sent.  Transferred to [facility] 12/19/17 for management of schizophrenia.     PAST MEDICAL & SURGICAL HISTORY:  Hypertension  No significant past surgical history      Review of Systems:   CONSTITUTIONAL: No fever, weight loss, or fatigue  EYES: No eye pain, visual disturbances, or discharge  ENMT:  No difficulty hearing, tinnitus, vertigo; No sinus or throat pain  NECK: No pain or stiffness  RESPIRATORY: No cough, wheezing, chills or hemoptysis; No shortness of breath  CARDIOVASCULAR: No chest pain, palpitations, dizziness, or leg swelling  GASTROINTESTINAL: No abdominal or epigastric pain. No nausea, vomiting, or hematemesis; No diarrhea or constipation. No melena or hematochezia.  GENITOURINARY: No dysuria, frequency, hematuria, or incontinence  NEUROLOGICAL: No headaches, memory loss, loss of strength, numbness, or tremors  SKIN: No itching, burning, rashes, or lesions   LYMPH NODES: No enlarged glands  ENDOCRINE: No heat or cold intolerance; No hair loss  MUSCULOSKELETAL: No joint pain or swelling; No muscle, back, or extremity pain  HEME/LYMPH: No easy bruising, or bleeding gums  ALLERY AND IMMUNOLOGIC: No hives or eczema    Allergies    No Known Allergies      Social History:     FAMILY HISTORY:  No pertinent family history in first degree relatives      MEDICATIONS  (STANDING):  ATENolol  Tablet 50 milliGRAM(s) Oral daily  cephalexin 500 milliGRAM(s) Oral every 12 hours  OLANZapine Disintegrating Tablet 5 milliGRAM(s) Oral at bedtime  tamsulosin 0.4 milliGRAM(s) Oral at bedtime    MEDICATIONS  (PRN):  diphenhydrAMINE   Capsule 50 milliGRAM(s) Oral every 6 hours PRN eps or agitation 2/2 psychiatric condition  diphenhydrAMINE   Injectable 50 milliGRAM(s) IntraMuscular once PRN eps or severe agitation 2/2 psychiatric condition  haloperidol     Tablet 5 milliGRAM(s) Oral every 6 hours PRN agitation 2/2 psychiatric condition  haloperidol    Injectable 5 milliGRAM(s) IntraMuscular once PRN severe agitation 2/2 psychiatric condition  ibuprofen  Tablet 600 milliGRAM(s) Oral every 6 hours PRN pain  LORazepam     Tablet 2 milliGRAM(s) Oral every 6 hours PRN agitation 2/2 psychiatric condition  LORazepam   Injectable 2 milliGRAM(s) IntraMuscular once PRN severe agitation 2/2 psychiatric condition        T(F): afeb  HR: 80  BP: 185/92  BP(mean): --  RR: 18 (19 Dec 2017 22:42) (18 - 18)  SpO2: 98% (19 Dec 2017 22:42) (98% - 98%)  CAPILLARY BLOOD GLUCOSE            PHYSICAL EXAM:  GENERAL: NAD, well-developed  HEAD:  Atraumatic, Normocephalic  EYES: EOMI, PERRLA, conjunctiva and sclera clear  NECK: Supple, No JVD  CHEST/LUNG: Clear to auscultation bilaterally; No wheeze  HEART: Regular rate and rhythm; No murmurs, rubs, or gallops  ABDOMEN: Soft, Nontender, Nondistended; Bowel sounds present  EXTREMITIES:  2+ Peripheral Pulses, No clubbing, cyanosis, or edema  PSYCH: AAOx3  NEUROLOGY: non-focal  SKIN: No rashes or lesions    LABS:  Free Thyroxine, Serum (12.17.17 @ 22:30)    Free Thyroxine, Serum: 1.07 ng/dL    T4, Serum (12.17.17 @ 22:30)    T4, Serum: 5.93 ug/dL    Triiodothyronine, Total (T3 Total) (12.17.17 @ 22:30)    Triiodothyronine, Total (T3 Total): 135.0ng/dL  Thyroid Stimulating Hormone, Serum (12.17.17 @ 22:30)    Thyroid Stimulating Hormone, Serum: 7.16 uIU/mL    Comprehensive Metabolic Panel (12.17.17 @ 22:30)    Sodium, Serum: 142 mmol/L    Potassium, Serum: 4.3: SPECIMEN SLIGHTLY HEMOLYZED mmol/L    Chloride, Serum: 103 mmol/L    Carbon Dioxide, Serum: 22 mmol/L    Blood Urea Nitrogen, Serum: 24 mg/dL    Creatinine, Serum: 0.86 mg/dL    Glucose, Serum: 138 mg/dL    Calcium, Total Serum: 8.7 mg/dL    Protein Total, Serum: 7.2 g/dL    Albumin, Serum: 3.9 g/dL    Bilirubin Total, Serum: 0.3 mg/dL    Alkaline Phosphatase, Serum: 150u/L    Aspartate Aminotransferase (AST/SGOT): 26 u/L    Alanine Aminotransferase (ALT/SGPT): 13 u/L    eGFR if Non : 95mL/min    eGFR if : 110 mL/min  Urinalysis (12.18.17 @ 05:30)    Color: YELLOW    Urine Appearance: HAZY    Glucose: NEGATIVE: Glucose is reported in mg/dL.  Negative is defined as < 0.03 mg/dL.    Bilirubin: NEGATIVE    Ketone - Urine: NEGATIVE    Specific Gravity: 1.021    Blood: SMALL    pH - Urine: 7.0    Protein, Urine: 20 mg/dL    Urobilinogen: NORMAL mg/dL    Nitrite: POSITIVE    Leukocyte Esterase Concentration: LARGE    Red Blood Cell - Urine: 5-10    White Blood Cell - Urine: >50    Hyaline Casts: 2-5    Mucus: FEW    White Blood Cell Clump: PRESENT    Bacteria: MANY    Squamous Epithelial: OCC    Non-Squamous Epithelial: <1        EKG(personally reviewed):< from: 12 Lead ECG (12.17.17 @ 22:39) >    Ventricular Rate 99 BPM    Atrial Rate 99 BPM    P-R Interval 158 ms    QRS Duration 122 ms    Q-T Interval 370 ms    QTC Calculation(Bezet) 474 ms    P Axis 51 degrees    R Axis -59 degrees    T Axis 94 degrees    Diagnosis Line Normal sinus rhythm  Left anterior fascicular block  Left ventricular hypertrophy with QRS widening and repolarization abnormality  Cannot rule out Septal infarct , age undetermined  Abnormal ECG    < end of copied text >      RADIOLOGY & ADDITIONAL TESTS:    Imaging Personally Reviewed:  < from: CT Head No Cont (12.18.17 @ 01:39) >    EXAM:  CT BRAIN        PROCEDURE DATE:  Dec 18 2017         INTERPRETATION:  HISTORY: Acute psychosis with possible head trauma.    Technique: CT of the head was performed without intravenous contrast.    Multiple contiguous axial images were acquired from the skullbase to the   vertex without the administration of intravenous contrast.  Coronal and   sagittal reformations were made.    COMPARISON: No prior head CT available for comparison.    FINDINGS:  There is a small scalp hematoma in the midline at the skull vertex and   also midline frontal scalp. No underlying calvarial fracture. No evidence   of acute intracranial hemorrhage, mass effect from vasogenic edema or   acute territorial infarct. Minimal chronic microvascular change with   patchy areas of low-attenuation in the bihemispheric white matter.    The ventricles, sulci and cisternal spaces are appropriate for patient's   stated age in size and configuration. There is no midline shift or   abnormal extra-axial fluid collection.    The calvarium is intact. No suspicious osteoblastic or lytic lesion. The   visualized paranasal sinuses and mastoid air cells are clear.    IMPRESSION:  Small scalp hematomas as above. No acute intracranial hemorrhage or   calvarial fracture.    CHOCO URBINA D.O., RADIOLOGY RESIDENT  This document has been electronically signed.  CAREN MARTIN M.D., RADIOLOGIST  This document has been electronically signed. Dec 18 2017  2:11AM      < end of copied text >  < from: Xray Chest 1 View AP- PORTABLE-Urgent (12.28.13 @ 20:38) >    EXAM:  CHEST-PORTABLE URGENT                            PROCEDURE DATE:  Dec 28 2013       INTERPRETATION:  DATE OF STUDY: 12/28/13 at 8:34 p.m.    HISTORY: Wheezing.    AP Chest    The heart, lungs, and bones are normal.    IMPRESSION: Normal AP Chest as on 09/06/2013      ASAD LATHAM M.D., ATTENDING RADIOLOGIST    This examination was interpreted on: Dec 29 2013 10:41A.  This document   has been electronically signed. Dec 29 2013 10:41A.        < end of copied text > HPI: Pt is 57M [facility]  He was treated empirically for UTI but no urine culture was sent.  Transferred to [facility] 12/19/17 for management of schizophrenia. He denies dysuria. He believes he is deficient in many vitamins and wants to see a dietician.      PAST MEDICAL & SURGICAL HISTORY:  Hypertension  No significant past surgical history      Review of Systems:   CONSTITUTIONAL: No fever, weight loss, + fatigue  EYES: No eye pain, visual disturbances, or discharge  ENMT:  No difficulty hearing, tinnitus, vertigo; No sinus or throat pain  NECK: No pain or stiffness  RESPIRATORY: No cough, wheezing, chills or hemoptysis; No shortness of breath  CARDIOVASCULAR: No chest pain, palpitations, dizziness, or leg swelling  GASTROINTESTINAL: No abdominal or epigastric pain. No nausea, vomiting, or hematemesis; No diarrhea or constipation. No melena or hematochezia.  GENITOURINARY: No dysuria, frequency, hematuria, or incontinence  NEUROLOGICAL: No headaches, memory loss, loss of strength, numbness, or tremors  SKIN: No itching, burning, rashes, or lesions   LYMPH NODES: No enlarged glands  ENDOCRINE: No heat or cold intolerance; No hair loss  MUSCULOSKELETAL: Feels globally weak  HEME/LYMPH: No easy bruising, or bleeding gums  ALLERY AND IMMUNOLOGIC: No hives or eczema    Allergies    No Known Allergies      Social History: denies etoh/tob/idu    FAMILY HISTORY:  No pertinent family history in first degree relatives      MEDICATIONS  (STANDING):  ATENolol  Tablet 50 milliGRAM(s) Oral daily  cephalexin 500 milliGRAM(s) Oral every 12 hours  OLANZapine Disintegrating Tablet 5 milliGRAM(s) Oral at bedtime  tamsulosin 0.4 milliGRAM(s) Oral at bedtime    MEDICATIONS  (PRN):  diphenhydrAMINE   Capsule 50 milliGRAM(s) Oral every 6 hours PRN eps or agitation 2/2 psychiatric condition  diphenhydrAMINE   Injectable 50 milliGRAM(s) IntraMuscular once PRN eps or severe agitation 2/2 psychiatric condition  haloperidol     Tablet 5 milliGRAM(s) Oral every 6 hours PRN agitation 2/2 psychiatric condition  haloperidol    Injectable 5 milliGRAM(s) IntraMuscular once PRN severe agitation 2/2 psychiatric condition  ibuprofen  Tablet 600 milliGRAM(s) Oral every 6 hours PRN pain  LORazepam     Tablet 2 milliGRAM(s) Oral every 6 hours PRN agitation 2/2 psychiatric condition  LORazepam   Injectable 2 milliGRAM(s) IntraMuscular once PRN severe agitation 2/2 psychiatric condition        T(F): afeb  HR: 80  BP: 185/92  BP(mean): --  RR: 18 (19 Dec 2017 22:42) (18 - 18)  SpO2: 98% (19 Dec 2017 22:42) (98% - 98%)  CAPILLARY BLOOD GLUCOSE            PHYSICAL EXAM:  GENERAL: NAD, well-developed  HEAD:  Atraumatic, Normocephalic  EYES: EOMI, PERRLA, conjunctiva and sclera clear  NECK: Supple, No JVD  CHEST/LUNG: Clear to auscultation bilaterally; No wheeze  HEART: Regular rate and rhythm; No murmurs, rubs, or gallops  ABDOMEN: Soft, Nontender, Nondistended; Bowel sounds present  EXTREMITIES:  2+ Peripheral Pulses, No clubbing, cyanosis, or edema extremely long curled toenails, poor foot hygiene  PSYCH: AAOx3  NEUROLOGY: non-focal  SKIN: No rashes or lesions    LABS:  Free Thyroxine, Serum (12.17.17 @ 22:30)    Free Thyroxine, Serum: 1.07 ng/dL    T4, Serum (12.17.17 @ 22:30)    T4, Serum: 5.93 ug/dL    Triiodothyronine, Total (T3 Total) (12.17.17 @ 22:30)    Triiodothyronine, Total (T3 Total): 135.0ng/dL  Thyroid Stimulating Hormone, Serum (12.17.17 @ 22:30)    Thyroid Stimulating Hormone, Serum: 7.16 uIU/mL    Comprehensive Metabolic Panel (12.17.17 @ 22:30)    Sodium, Serum: 142 mmol/L    Potassium, Serum: 4.3: SPECIMEN SLIGHTLY HEMOLYZED mmol/L    Chloride, Serum: 103 mmol/L    Carbon Dioxide, Serum: 22 mmol/L    Blood Urea Nitrogen, Serum: 24 mg/dL    Creatinine, Serum: 0.86 mg/dL    Glucose, Serum: 138 mg/dL    Calcium, Total Serum: 8.7 mg/dL    Protein Total, Serum: 7.2 g/dL    Albumin, Serum: 3.9 g/dL    Bilirubin Total, Serum: 0.3 mg/dL    Alkaline Phosphatase, Serum: 150u/L    Aspartate Aminotransferase (AST/SGOT): 26 u/L    Alanine Aminotransferase (ALT/SGPT): 13 u/L    eGFR if Non : 95mL/min    eGFR if : 110 mL/min  Urinalysis (12.18.17 @ 05:30)    Color: YELLOW    Urine Appearance: HAZY    Glucose: NEGATIVE: Glucose is reported in mg/dL.  Negative is defined as < 0.03 mg/dL.    Bilirubin: NEGATIVE    Ketone - Urine: NEGATIVE    Specific Gravity: 1.021    Blood: SMALL    pH - Urine: 7.0    Protein, Urine: 20 mg/dL    Urobilinogen: NORMAL mg/dL    Nitrite: POSITIVE    Leukocyte Esterase Concentration: LARGE    Red Blood Cell - Urine: 5-10    White Blood Cell - Urine: >50    Hyaline Casts: 2-5    Mucus: FEW    White Blood Cell Clump: PRESENT    Bacteria: MANY    Squamous Epithelial: OCC    Non-Squamous Epithelial: <1        EKG(personally reviewed):< from: 12 Lead ECG (12.17.17 @ 22:39) >    Ventricular Rate 99 BPM    Atrial Rate 99 BPM    P-R Interval 158 ms    QRS Duration 122 ms    Q-T Interval 370 ms    QTC Calculation(Bezet) 474 ms    P Axis 51 degrees    R Axis -59 degrees    T Axis 94 degrees    Diagnosis Line Normal sinus rhythm  Left anterior fascicular block  Left ventricular hypertrophy with QRS widening and repolarization abnormality  Cannot rule out Septal infarct , age undetermined  Abnormal ECG    < end of copied text >      RADIOLOGY & ADDITIONAL TESTS:    Imaging Personally Reviewed:  < from: CT Head No Cont (12.18.17 @ 01:39) >    EXAM:  CT BRAIN        PROCEDURE DATE:  Dec 18 2017         INTERPRETATION:  HISTORY: Acute psychosis with possible head trauma.    Technique: CT of the head was performed without intravenous contrast.    Multiple contiguous axial images were acquired from the skullbase to the   vertex without the administration of intravenous contrast.  Coronal and   sagittal reformations were made.    COMPARISON: No prior head CT available for comparison.    FINDINGS:  There is a small scalp hematoma in the midline at the skull vertex and   also midline frontal scalp. No underlying calvarial fracture. No evidence   of acute intracranial hemorrhage, mass effect from vasogenic edema or   acute territorial infarct. Minimal chronic microvascular change with   patchy areas of low-attenuation in the bihemispheric white matter.    The ventricles, sulci and cisternal spaces are appropriate for patient's   stated age in size and configuration. There is no midline shift or   abnormal extra-axial fluid collection.    The calvarium is intact. No suspicious osteoblastic or lytic lesion. The   visualized paranasal sinuses and mastoid air cells are clear.    IMPRESSION:  Small scalp hematomas as above. No acute intracranial hemorrhage or   calvarial fracture.    CHOCO URBINA D.O., RADIOLOGY RESIDENT  This document has been electronically signed.  CAREN MARTIN M.D., RADIOLOGIST  This document has been electronically signed. Dec 18 2017  2:11AM      < end of copied text >  < from: Xray Chest 1 View AP- PORTABLE-Urgent (12.28.13 @ 20:38) >    EXAM:  CHEST-PORTABLE URGENT                            PROCEDURE DATE:  Dec 28 2013       INTERPRETATION:  DATE OF STUDY: 12/28/13 at 8:34 p.m.    HISTORY: Wheezing.    AP Chest    The heart, lungs, and bones are normal.    IMPRESSION: Normal AP Chest as on 09/06/2013      ASAD LATHAM M.D., ATTENDING RADIOLOGIST    This examination was interpreted on: Dec 29 2013 10:41A.  This document   has been electronically signed. Dec 29 2013 10:41A.        < end of copied text >

## 2017-12-21 PROCEDURE — 99232 SBSQ HOSP IP/OBS MODERATE 35: CPT | Mod: GC

## 2017-12-22 PROCEDURE — 99232 SBSQ HOSP IP/OBS MODERATE 35: CPT | Mod: GC

## 2017-12-22 PROCEDURE — 99233 SBSQ HOSP IP/OBS HIGH 50: CPT

## 2017-12-22 RX ORDER — OLANZAPINE 15 MG/1
10 TABLET, FILM COATED ORAL AT BEDTIME
Qty: 0 | Refills: 0 | Status: DISCONTINUED | OUTPATIENT
Start: 2017-12-22 | End: 2017-12-28

## 2017-12-22 RX ORDER — DIPHENHYDRAMINE HCL 50 MG
50 CAPSULE ORAL ONCE
Qty: 0 | Refills: 0 | Status: DISCONTINUED | OUTPATIENT
Start: 2017-12-22 | End: 2017-12-23

## 2017-12-22 RX ORDER — HALOPERIDOL DECANOATE 100 MG/ML
5 INJECTION INTRAMUSCULAR ONCE
Qty: 0 | Refills: 0 | Status: DISCONTINUED | OUTPATIENT
Start: 2017-12-22 | End: 2017-12-23

## 2017-12-22 NOTE — PROGRESS NOTE ADULT - SUBJECTIVE AND OBJECTIVE BOX
CC/Reason for Consult: f/u medical eval    SUBJECTIVE / OVERNIGHT EVENTS:  Patient is much more irritable than when assessed on 12/20.  Refuses to answer questions about symptoms.  Refusing to get out of bed, using bedpan, declines to answer whether he can walk or why he won't try to get out of bed.  Refused PT eval.  Refuses to answer questions about urinary symptoms.  Has refused all medications and blood draws for the past two days, refused VS today.    MEDICATIONS  (STANDING):  ATENolol  Tablet 50 milliGRAM(s) Oral daily  cephalexin 500 milliGRAM(s) Oral every 12 hours  multivitamin 1 Tablet(s) Oral daily  OLANZapine Disintegrating Tablet 10 milliGRAM(s) Oral at bedtime  rivaroxaban 10 milliGRAM(s) Oral daily  tamsulosin 0.4 milliGRAM(s) Oral at bedtime    MEDICATIONS  (PRN):  diphenhydrAMINE   Capsule 50 milliGRAM(s) Oral every 6 hours PRN eps or agitation 2/2 psychiatric condition  diphenhydrAMINE   Injectable 50 milliGRAM(s) IntraMuscular once PRN eps or severe agitation 2/2 psychiatric condition  haloperidol     Tablet 5 milliGRAM(s) Oral every 6 hours PRN agitation 2/2 psychiatric condition  haloperidol    Injectable 5 milliGRAM(s) IntraMuscular once PRN severe agitation 2/2 psychiatric condition  ibuprofen  Tablet 600 milliGRAM(s) Oral every 6 hours PRN pain  LORazepam     Tablet 2 milliGRAM(s) Oral every 6 hours PRN agitation 2/2 psychiatric condition  LORazepam   Injectable 2 milliGRAM(s) IntraMuscular once PRN severe agitation 2/2 psychiatric condition      refused VS 12/22 12/21  T(F): 97.6  HR: 69  BP: 144/96  RR: 15      PHYSICAL EXAM:  GENERAL: NAD, well-developed, in bed  HEAD:  Atraumatic, Normocephalic  EYES: EOMI, conjunctiva and sclera clear  NECK: Supple, No JVD  CHEST/LUNG: Refused ausculatation, breathing unlabored  HEART: refused auscultation  ABDOMEN: Nondistended  EXTREMITIES:   No clubbing, cyanosis, or edema, extremely long toenails  PSYCH: alert, conversant, irritable  NEUROLOGY: non-focal  SKIN: No rashes or lesions    Care Discussed with Consultants/Other Providers: Dr Bishop

## 2017-12-22 NOTE — PROGRESS NOTE ADULT - ASSESSMENT
57M schizophrenia, with HTN, long toenails, subclinical hypothyroidism, UTI    Plan:  HTN: Would continue atenolol, monitor BP.  Should be screened for DM/HLD with HbA1c, lipid profile.  Patient at risk of long term sequelae of uncontrolled hypertension if he continues to refuse medication (stroke, MI, renal damage).      Toenails: podiatry consult for toenail clipping when agreeable    UTI: Refusing antibiotics, may treat IM ceftriaxone 1gm daily x 5 days, at risk for development of systemic infection if UTI untreated (as long as patient is refusing to submit urine sample, labs, VS or report symptoms, presence or absence of infection cannot be determined)    Subclinical hypothyroidism: small elevation in TSH with normal thyroid hormone levels.  Check TFTs in 3 months, no treatment indicated at present.    Weakness: PT eval.  Would send B12 level, continue multivitamin.  Lovenox 40mg SC daily for VTE prevention as long as patient remains immobile    Schizophrenia: management per primary team.  Patient would benefit from court mandate for psychiatric medications, antibiotics, antihypertensives and VTE prevention medication.  Untreated psychosis places him at risk of sepsis due to untreated infection, venous thromboembolism, and sequelae of untreated hypertension.

## 2017-12-23 PROCEDURE — 99232 SBSQ HOSP IP/OBS MODERATE 35: CPT

## 2017-12-23 RX ADMIN — Medication 10 MILLIGRAM(S): at 21:21

## 2017-12-23 RX ADMIN — Medication 10 MILLIGRAM(S): at 16:47

## 2017-12-23 RX ADMIN — Medication 10 MILLIGRAM(S): at 11:59

## 2017-12-24 PROCEDURE — 99232 SBSQ HOSP IP/OBS MODERATE 35: CPT

## 2017-12-24 RX ADMIN — HALOPERIDOL DECANOATE 5 MILLIGRAM(S): 100 INJECTION INTRAMUSCULAR at 04:35

## 2017-12-26 PROCEDURE — 99232 SBSQ HOSP IP/OBS MODERATE 35: CPT | Mod: GC

## 2017-12-26 RX ORDER — DIPHENHYDRAMINE HCL 50 MG
50 CAPSULE ORAL ONCE
Qty: 0 | Refills: 0 | Status: DISCONTINUED | OUTPATIENT
Start: 2017-12-26 | End: 2017-12-28

## 2017-12-26 RX ORDER — DIPHENHYDRAMINE HCL 50 MG
50 CAPSULE ORAL ONCE
Qty: 0 | Refills: 0 | Status: DISCONTINUED | OUTPATIENT
Start: 2017-12-26 | End: 2017-12-26

## 2017-12-26 RX ORDER — HALOPERIDOL DECANOATE 100 MG/ML
5 INJECTION INTRAMUSCULAR ONCE
Qty: 0 | Refills: 0 | Status: DISCONTINUED | OUTPATIENT
Start: 2017-12-26 | End: 2017-12-26

## 2017-12-26 RX ORDER — HALOPERIDOL DECANOATE 100 MG/ML
5 INJECTION INTRAMUSCULAR ONCE
Qty: 0 | Refills: 0 | Status: DISCONTINUED | OUTPATIENT
Start: 2017-12-26 | End: 2017-12-28

## 2017-12-26 RX ADMIN — Medication 50 MILLIGRAM(S): at 09:45

## 2017-12-26 RX ADMIN — HALOPERIDOL DECANOATE 5 MILLIGRAM(S): 100 INJECTION INTRAMUSCULAR at 09:45

## 2017-12-26 RX ADMIN — Medication 2 MILLIGRAM(S): at 09:45

## 2017-12-27 PROCEDURE — 99232 SBSQ HOSP IP/OBS MODERATE 35: CPT | Mod: GC

## 2017-12-27 RX ADMIN — Medication 2 MILLIGRAM(S): at 09:43

## 2017-12-27 RX ADMIN — Medication 10 MILLIGRAM(S): at 09:42

## 2017-12-27 RX ADMIN — HALOPERIDOL DECANOATE 5 MILLIGRAM(S): 100 INJECTION INTRAMUSCULAR at 09:43

## 2017-12-27 RX ADMIN — RIVAROXABAN 10 MILLIGRAM(S): KIT at 09:43

## 2017-12-27 RX ADMIN — Medication 50 MILLIGRAM(S): at 09:43

## 2017-12-27 RX ADMIN — OLANZAPINE 10 MILLIGRAM(S): 15 TABLET, FILM COATED ORAL at 21:08

## 2017-12-27 RX ADMIN — TAMSULOSIN HYDROCHLORIDE 0.4 MILLIGRAM(S): 0.4 CAPSULE ORAL at 21:08

## 2017-12-27 RX ADMIN — Medication 1 TABLET(S): at 09:42

## 2017-12-27 RX ADMIN — Medication 10 MILLIGRAM(S): at 21:08

## 2017-12-27 RX ADMIN — ATENOLOL 50 MILLIGRAM(S): 25 TABLET ORAL at 09:42

## 2017-12-28 PROCEDURE — 99232 SBSQ HOSP IP/OBS MODERATE 35: CPT | Mod: GC

## 2017-12-28 RX ORDER — TAMSULOSIN HYDROCHLORIDE 0.4 MG/1
0.4 CAPSULE ORAL
Qty: 0 | Refills: 0 | Status: DISCONTINUED | OUTPATIENT
Start: 2017-12-28 | End: 2018-02-01

## 2017-12-28 RX ORDER — OLANZAPINE 15 MG/1
15 TABLET, FILM COATED ORAL
Qty: 0 | Refills: 0 | Status: DISCONTINUED | OUTPATIENT
Start: 2017-12-28 | End: 2018-01-04

## 2017-12-28 RX ORDER — LOPERAMIDE HCL 2 MG
2 TABLET ORAL ONCE
Qty: 0 | Refills: 0 | Status: COMPLETED | OUTPATIENT
Start: 2017-12-28 | End: 2017-12-28

## 2017-12-28 RX ORDER — OLANZAPINE 15 MG/1
15 TABLET, FILM COATED ORAL AT BEDTIME
Qty: 0 | Refills: 0 | Status: DISCONTINUED | OUTPATIENT
Start: 2017-12-28 | End: 2017-12-28

## 2017-12-29 PROCEDURE — 99232 SBSQ HOSP IP/OBS MODERATE 35: CPT

## 2017-12-29 RX ORDER — HALOPERIDOL DECANOATE 100 MG/ML
5 INJECTION INTRAMUSCULAR ONCE
Qty: 0 | Refills: 0 | Status: COMPLETED | OUTPATIENT
Start: 2017-12-29 | End: 2017-12-29

## 2017-12-29 RX ORDER — DIPHENHYDRAMINE HCL 50 MG
50 CAPSULE ORAL ONCE
Qty: 0 | Refills: 0 | Status: COMPLETED | OUTPATIENT
Start: 2017-12-29 | End: 2017-12-29

## 2017-12-29 RX ADMIN — RIVAROXABAN 10 MILLIGRAM(S): KIT at 12:03

## 2017-12-29 RX ADMIN — HALOPERIDOL DECANOATE 5 MILLIGRAM(S): 100 INJECTION INTRAMUSCULAR at 11:30

## 2017-12-29 RX ADMIN — Medication 1 TABLET(S): at 12:02

## 2017-12-29 RX ADMIN — Medication 50 MILLIGRAM(S): at 11:30

## 2017-12-29 RX ADMIN — ATENOLOL 50 MILLIGRAM(S): 25 TABLET ORAL at 12:02

## 2017-12-29 RX ADMIN — Medication 2 MILLIGRAM(S): at 11:30

## 2017-12-29 RX ADMIN — Medication 10 MILLIGRAM(S): at 12:02

## 2018-01-02 RX ORDER — HALOPERIDOL DECANOATE 100 MG/ML
5 INJECTION INTRAMUSCULAR ONCE
Qty: 0 | Refills: 0 | Status: COMPLETED | OUTPATIENT
Start: 2018-01-02 | End: 2018-01-02

## 2018-01-02 RX ORDER — DIPHENHYDRAMINE HCL 50 MG
50 CAPSULE ORAL ONCE
Qty: 0 | Refills: 0 | Status: COMPLETED | OUTPATIENT
Start: 2018-01-02 | End: 2018-01-02

## 2018-01-02 RX ADMIN — Medication 50 MILLIGRAM(S): at 12:00

## 2018-01-02 RX ADMIN — HALOPERIDOL DECANOATE 5 MILLIGRAM(S): 100 INJECTION INTRAMUSCULAR at 12:00

## 2018-01-02 RX ADMIN — Medication 2 MILLIGRAM(S): at 12:00

## 2018-01-03 LAB
ALBUMIN SERPL ELPH-MCNC: 3.6 G/DL — SIGNIFICANT CHANGE UP (ref 3.3–5)
ALP SERPL-CCNC: 152 U/L — HIGH (ref 40–120)
ALT FLD-CCNC: 16 U/L — SIGNIFICANT CHANGE UP (ref 4–41)
AST SERPL-CCNC: 28 U/L — SIGNIFICANT CHANGE UP (ref 4–40)
BASOPHILS # BLD AUTO: 0.05 K/UL — SIGNIFICANT CHANGE UP (ref 0–0.2)
BASOPHILS NFR BLD AUTO: 0.5 % — SIGNIFICANT CHANGE UP (ref 0–2)
BILIRUB SERPL-MCNC: 0.4 MG/DL — SIGNIFICANT CHANGE UP (ref 0.2–1.2)
BUN SERPL-MCNC: 21 MG/DL — SIGNIFICANT CHANGE UP (ref 7–23)
CALCIUM SERPL-MCNC: 8.9 MG/DL — SIGNIFICANT CHANGE UP (ref 8.4–10.5)
CHLORIDE SERPL-SCNC: 101 MMOL/L — SIGNIFICANT CHANGE UP (ref 98–107)
CHOLEST SERPL-MCNC: 177 MG/DL — SIGNIFICANT CHANGE UP (ref 120–199)
CO2 SERPL-SCNC: 23 MMOL/L — SIGNIFICANT CHANGE UP (ref 22–31)
CREAT SERPL-MCNC: 0.78 MG/DL — SIGNIFICANT CHANGE UP (ref 0.5–1.3)
EOSINOPHIL # BLD AUTO: 0.87 K/UL — HIGH (ref 0–0.5)
EOSINOPHIL NFR BLD AUTO: 9.1 % — HIGH (ref 0–6)
GLUCOSE SERPL-MCNC: 82 MG/DL — SIGNIFICANT CHANGE UP (ref 70–99)
HBA1C BLD-MCNC: 5 % — SIGNIFICANT CHANGE UP (ref 4–5.6)
HCT VFR BLD CALC: 52.5 % — HIGH (ref 39–50)
HDLC SERPL-MCNC: 43 MG/DL — SIGNIFICANT CHANGE UP (ref 35–55)
HGB BLD-MCNC: 17.2 G/DL — HIGH (ref 13–17)
IMM GRANULOCYTES # BLD AUTO: 0.02 # — SIGNIFICANT CHANGE UP
IMM GRANULOCYTES NFR BLD AUTO: 0.2 % — SIGNIFICANT CHANGE UP (ref 0–1.5)
LIPID PNL WITH DIRECT LDL SERPL: 122 MG/DL — SIGNIFICANT CHANGE UP
LYMPHOCYTES # BLD AUTO: 2.94 K/UL — SIGNIFICANT CHANGE UP (ref 1–3.3)
LYMPHOCYTES # BLD AUTO: 30.6 % — SIGNIFICANT CHANGE UP (ref 13–44)
MCHC RBC-ENTMCNC: 29 PG — SIGNIFICANT CHANGE UP (ref 27–34)
MCHC RBC-ENTMCNC: 32.8 % — SIGNIFICANT CHANGE UP (ref 32–36)
MCV RBC AUTO: 88.5 FL — SIGNIFICANT CHANGE UP (ref 80–100)
MONOCYTES # BLD AUTO: 0.87 K/UL — SIGNIFICANT CHANGE UP (ref 0–0.9)
MONOCYTES NFR BLD AUTO: 9.1 % — SIGNIFICANT CHANGE UP (ref 2–14)
NEUTROPHILS # BLD AUTO: 4.86 K/UL — SIGNIFICANT CHANGE UP (ref 1.8–7.4)
NEUTROPHILS NFR BLD AUTO: 50.5 % — SIGNIFICANT CHANGE UP (ref 43–77)
NRBC # FLD: 0 — SIGNIFICANT CHANGE UP
PLATELET # BLD AUTO: 350 K/UL — SIGNIFICANT CHANGE UP (ref 150–400)
PMV BLD: 9.4 FL — SIGNIFICANT CHANGE UP (ref 7–13)
POTASSIUM SERPL-MCNC: 4.5 MMOL/L — SIGNIFICANT CHANGE UP (ref 3.5–5.3)
POTASSIUM SERPL-SCNC: 4.5 MMOL/L — SIGNIFICANT CHANGE UP (ref 3.5–5.3)
PROT SERPL-MCNC: 7 G/DL — SIGNIFICANT CHANGE UP (ref 6–8.3)
RBC # BLD: 5.93 M/UL — HIGH (ref 4.2–5.8)
RBC # FLD: 14.2 % — SIGNIFICANT CHANGE UP (ref 10.3–14.5)
SODIUM SERPL-SCNC: 139 MMOL/L — SIGNIFICANT CHANGE UP (ref 135–145)
T4 FREE SERPL-MCNC: 1.1 NG/DL — SIGNIFICANT CHANGE UP (ref 0.9–1.8)
TRIGL SERPL-MCNC: 122 MG/DL — SIGNIFICANT CHANGE UP (ref 10–149)
TSH SERPL-MCNC: 5.44 UIU/ML — HIGH (ref 0.27–4.2)
WBC # BLD: 9.61 K/UL — SIGNIFICANT CHANGE UP (ref 3.8–10.5)
WBC # FLD AUTO: 9.61 K/UL — SIGNIFICANT CHANGE UP (ref 3.8–10.5)

## 2018-01-03 RX ADMIN — Medication 1 TABLET(S): at 10:23

## 2018-01-03 RX ADMIN — Medication 10 MILLIGRAM(S): at 16:50

## 2018-01-03 RX ADMIN — TAMSULOSIN HYDROCHLORIDE 0.4 MILLIGRAM(S): 0.4 CAPSULE ORAL at 18:25

## 2018-01-03 RX ADMIN — Medication 10 MILLIGRAM(S): at 11:25

## 2018-01-03 RX ADMIN — Medication 10 MILLIGRAM(S): at 21:26

## 2018-01-03 RX ADMIN — OLANZAPINE 15 MILLIGRAM(S): 15 TABLET, FILM COATED ORAL at 18:25

## 2018-01-03 RX ADMIN — RIVAROXABAN 10 MILLIGRAM(S): KIT at 10:23

## 2018-01-03 RX ADMIN — ATENOLOL 50 MILLIGRAM(S): 25 TABLET ORAL at 10:23

## 2018-01-04 PROCEDURE — 99232 SBSQ HOSP IP/OBS MODERATE 35: CPT | Mod: GC

## 2018-01-04 RX ORDER — OLANZAPINE 15 MG/1
20 TABLET, FILM COATED ORAL
Qty: 0 | Refills: 0 | Status: DISCONTINUED | OUTPATIENT
Start: 2018-01-04 | End: 2018-01-08

## 2018-01-04 RX ADMIN — TAMSULOSIN HYDROCHLORIDE 0.4 MILLIGRAM(S): 0.4 CAPSULE ORAL at 18:00

## 2018-01-04 RX ADMIN — Medication 50 MILLIGRAM(S): at 23:52

## 2018-01-04 RX ADMIN — ATENOLOL 50 MILLIGRAM(S): 25 TABLET ORAL at 09:35

## 2018-01-04 RX ADMIN — Medication 50 MILLIGRAM(S): at 09:35

## 2018-01-04 RX ADMIN — Medication 2 MILLIGRAM(S): at 09:36

## 2018-01-04 RX ADMIN — Medication 1 TABLET(S): at 09:35

## 2018-01-04 RX ADMIN — HALOPERIDOL DECANOATE 5 MILLIGRAM(S): 100 INJECTION INTRAMUSCULAR at 09:35

## 2018-01-04 RX ADMIN — Medication 10 MILLIGRAM(S): at 17:11

## 2018-01-04 RX ADMIN — RIVAROXABAN 10 MILLIGRAM(S): KIT at 09:35

## 2018-01-04 RX ADMIN — Medication 10 MILLIGRAM(S): at 23:52

## 2018-01-04 RX ADMIN — OLANZAPINE 20 MILLIGRAM(S): 15 TABLET, FILM COATED ORAL at 18:00

## 2018-01-04 RX ADMIN — Medication 10 MILLIGRAM(S): at 09:35

## 2018-01-04 RX ADMIN — HALOPERIDOL DECANOATE 5 MILLIGRAM(S): 100 INJECTION INTRAMUSCULAR at 23:53

## 2018-01-04 RX ADMIN — Medication 10 MILLIGRAM(S): at 13:13

## 2018-01-05 PROCEDURE — 99232 SBSQ HOSP IP/OBS MODERATE 35: CPT | Mod: GC

## 2018-01-05 RX ADMIN — OLANZAPINE 20 MILLIGRAM(S): 15 TABLET, FILM COATED ORAL at 17:24

## 2018-01-05 RX ADMIN — Medication 2 MILLIGRAM(S): at 09:34

## 2018-01-05 RX ADMIN — ATENOLOL 50 MILLIGRAM(S): 25 TABLET ORAL at 09:33

## 2018-01-05 RX ADMIN — Medication 10 MILLIGRAM(S): at 09:33

## 2018-01-05 RX ADMIN — HALOPERIDOL DECANOATE 5 MILLIGRAM(S): 100 INJECTION INTRAMUSCULAR at 09:34

## 2018-01-05 RX ADMIN — Medication 1 TABLET(S): at 09:33

## 2018-01-05 RX ADMIN — Medication 10 MILLIGRAM(S): at 12:00

## 2018-01-05 RX ADMIN — RIVAROXABAN 10 MILLIGRAM(S): KIT at 09:33

## 2018-01-05 RX ADMIN — Medication 50 MILLIGRAM(S): at 09:33

## 2018-01-06 RX ADMIN — Medication 10 MILLIGRAM(S): at 20:58

## 2018-01-06 RX ADMIN — OLANZAPINE 20 MILLIGRAM(S): 15 TABLET, FILM COATED ORAL at 18:32

## 2018-01-06 RX ADMIN — RIVAROXABAN 10 MILLIGRAM(S): KIT at 09:45

## 2018-01-06 RX ADMIN — Medication 10 MILLIGRAM(S): at 17:34

## 2018-01-06 RX ADMIN — Medication 10 MILLIGRAM(S): at 11:41

## 2018-01-06 RX ADMIN — Medication 10 MILLIGRAM(S): at 07:48

## 2018-01-06 RX ADMIN — ATENOLOL 50 MILLIGRAM(S): 25 TABLET ORAL at 09:45

## 2018-01-06 RX ADMIN — Medication 1 TABLET(S): at 09:45

## 2018-01-06 RX ADMIN — TAMSULOSIN HYDROCHLORIDE 0.4 MILLIGRAM(S): 0.4 CAPSULE ORAL at 18:33

## 2018-01-07 RX ADMIN — Medication 10 MILLIGRAM(S): at 17:57

## 2018-01-07 RX ADMIN — ATENOLOL 50 MILLIGRAM(S): 25 TABLET ORAL at 09:04

## 2018-01-07 RX ADMIN — Medication 10 MILLIGRAM(S): at 08:04

## 2018-01-07 RX ADMIN — Medication 10 MILLIGRAM(S): at 20:43

## 2018-01-07 RX ADMIN — OLANZAPINE 20 MILLIGRAM(S): 15 TABLET, FILM COATED ORAL at 18:00

## 2018-01-07 RX ADMIN — TAMSULOSIN HYDROCHLORIDE 0.4 MILLIGRAM(S): 0.4 CAPSULE ORAL at 18:00

## 2018-01-07 RX ADMIN — RIVAROXABAN 10 MILLIGRAM(S): KIT at 09:04

## 2018-01-07 RX ADMIN — Medication 10 MILLIGRAM(S): at 12:29

## 2018-01-07 RX ADMIN — Medication 1 TABLET(S): at 09:04

## 2018-01-08 PROCEDURE — 99232 SBSQ HOSP IP/OBS MODERATE 35: CPT | Mod: GC

## 2018-01-08 RX ORDER — OLANZAPINE 15 MG/1
25 TABLET, FILM COATED ORAL
Qty: 0 | Refills: 0 | Status: DISCONTINUED | OUTPATIENT
Start: 2018-01-08 | End: 2018-01-09

## 2018-01-08 RX ADMIN — Medication 1 TABLET(S): at 09:41

## 2018-01-08 RX ADMIN — Medication 10 MILLIGRAM(S): at 17:37

## 2018-01-08 RX ADMIN — RIVAROXABAN 10 MILLIGRAM(S): KIT at 09:41

## 2018-01-08 RX ADMIN — OLANZAPINE 25 MILLIGRAM(S): 15 TABLET, FILM COATED ORAL at 17:37

## 2018-01-08 RX ADMIN — ATENOLOL 50 MILLIGRAM(S): 25 TABLET ORAL at 09:41

## 2018-01-08 RX ADMIN — TAMSULOSIN HYDROCHLORIDE 0.4 MILLIGRAM(S): 0.4 CAPSULE ORAL at 17:37

## 2018-01-08 RX ADMIN — Medication 10 MILLIGRAM(S): at 21:11

## 2018-01-08 RX ADMIN — Medication 10 MILLIGRAM(S): at 12:43

## 2018-01-08 RX ADMIN — RIVAROXABAN 10 MILLIGRAM(S): KIT at 21:02

## 2018-01-08 RX ADMIN — Medication 10 MILLIGRAM(S): at 08:41

## 2018-01-09 PROCEDURE — 99232 SBSQ HOSP IP/OBS MODERATE 35: CPT | Mod: GC

## 2018-01-09 RX ORDER — RIVAROXABAN 15 MG-20MG
10 KIT ORAL EVERY 24 HOURS
Qty: 0 | Refills: 0 | Status: DISCONTINUED | OUTPATIENT
Start: 2018-01-09 | End: 2018-01-09

## 2018-01-09 RX ORDER — OLANZAPINE 15 MG/1
30 TABLET, FILM COATED ORAL
Qty: 0 | Refills: 0 | Status: DISCONTINUED | OUTPATIENT
Start: 2018-01-09 | End: 2018-01-11

## 2018-01-09 RX ORDER — RIVAROXABAN 15 MG-20MG
10 KIT ORAL DAILY
Qty: 0 | Refills: 0 | Status: DISCONTINUED | OUTPATIENT
Start: 2018-01-09 | End: 2018-02-01

## 2018-01-09 RX ADMIN — OLANZAPINE 30 MILLIGRAM(S): 15 TABLET, FILM COATED ORAL at 18:03

## 2018-01-09 RX ADMIN — RIVAROXABAN 10 MILLIGRAM(S): KIT at 11:32

## 2018-01-09 RX ADMIN — Medication 1 TABLET(S): at 11:18

## 2018-01-09 RX ADMIN — TAMSULOSIN HYDROCHLORIDE 0.4 MILLIGRAM(S): 0.4 CAPSULE ORAL at 18:03

## 2018-01-09 RX ADMIN — Medication 10 MILLIGRAM(S): at 06:54

## 2018-01-09 RX ADMIN — Medication 10 MILLIGRAM(S): at 18:03

## 2018-01-09 RX ADMIN — Medication 10 MILLIGRAM(S): at 11:40

## 2018-01-09 RX ADMIN — ATENOLOL 50 MILLIGRAM(S): 25 TABLET ORAL at 11:18

## 2018-01-09 RX ADMIN — Medication 10 MILLIGRAM(S): at 21:23

## 2018-01-10 PROCEDURE — 99232 SBSQ HOSP IP/OBS MODERATE 35: CPT

## 2018-01-10 RX ADMIN — Medication 10 MILLIGRAM(S): at 11:56

## 2018-01-10 RX ADMIN — Medication 10 MILLIGRAM(S): at 16:24

## 2018-01-10 RX ADMIN — Medication 10 MILLIGRAM(S): at 06:50

## 2018-01-10 RX ADMIN — Medication 10 MILLIGRAM(S): at 21:38

## 2018-01-10 RX ADMIN — ATENOLOL 50 MILLIGRAM(S): 25 TABLET ORAL at 08:52

## 2018-01-10 RX ADMIN — Medication 1 TABLET(S): at 08:52

## 2018-01-10 RX ADMIN — OLANZAPINE 30 MILLIGRAM(S): 15 TABLET, FILM COATED ORAL at 18:29

## 2018-01-10 RX ADMIN — RIVAROXABAN 10 MILLIGRAM(S): KIT at 08:52

## 2018-01-10 RX ADMIN — TAMSULOSIN HYDROCHLORIDE 0.4 MILLIGRAM(S): 0.4 CAPSULE ORAL at 18:29

## 2018-01-11 LAB
BASOPHILS # BLD AUTO: 0.03 K/UL — SIGNIFICANT CHANGE UP (ref 0–0.2)
BASOPHILS NFR BLD AUTO: 0.2 % — SIGNIFICANT CHANGE UP (ref 0–2)
EOSINOPHIL # BLD AUTO: 1.1 K/UL — HIGH (ref 0–0.5)
EOSINOPHIL NFR BLD AUTO: 9 % — HIGH (ref 0–6)
HCT VFR BLD CALC: 50.2 % — HIGH (ref 39–50)
HGB BLD-MCNC: 16.7 G/DL — SIGNIFICANT CHANGE UP (ref 13–17)
IMM GRANULOCYTES # BLD AUTO: 0.04 # — SIGNIFICANT CHANGE UP
IMM GRANULOCYTES NFR BLD AUTO: 0.3 % — SIGNIFICANT CHANGE UP (ref 0–1.5)
LYMPHOCYTES # BLD AUTO: 2.57 K/UL — SIGNIFICANT CHANGE UP (ref 1–3.3)
LYMPHOCYTES # BLD AUTO: 20.9 % — SIGNIFICANT CHANGE UP (ref 13–44)
MCHC RBC-ENTMCNC: 29.5 PG — SIGNIFICANT CHANGE UP (ref 27–34)
MCHC RBC-ENTMCNC: 33.3 % — SIGNIFICANT CHANGE UP (ref 32–36)
MCV RBC AUTO: 88.7 FL — SIGNIFICANT CHANGE UP (ref 80–100)
MONOCYTES # BLD AUTO: 0.87 K/UL — SIGNIFICANT CHANGE UP (ref 0–0.9)
MONOCYTES NFR BLD AUTO: 7.1 % — SIGNIFICANT CHANGE UP (ref 2–14)
NEUTROPHILS # BLD AUTO: 7.66 K/UL — HIGH (ref 1.8–7.4)
NEUTROPHILS NFR BLD AUTO: 62.5 % — SIGNIFICANT CHANGE UP (ref 43–77)
NRBC # FLD: 0 — SIGNIFICANT CHANGE UP
PLATELET # BLD AUTO: 330 K/UL — SIGNIFICANT CHANGE UP (ref 150–400)
PMV BLD: 9.7 FL — SIGNIFICANT CHANGE UP (ref 7–13)
RBC # BLD: 5.66 M/UL — SIGNIFICANT CHANGE UP (ref 4.2–5.8)
RBC # FLD: 14.4 % — SIGNIFICANT CHANGE UP (ref 10.3–14.5)
WBC # BLD: 12.27 K/UL — HIGH (ref 3.8–10.5)
WBC # FLD AUTO: 12.27 K/UL — HIGH (ref 3.8–10.5)

## 2018-01-11 PROCEDURE — 99232 SBSQ HOSP IP/OBS MODERATE 35: CPT | Mod: GC

## 2018-01-11 RX ORDER — CLOZAPINE 150 MG/1
25 TABLET, ORALLY DISINTEGRATING ORAL
Qty: 0 | Refills: 0 | Status: DISCONTINUED | OUTPATIENT
Start: 2018-01-11 | End: 2018-01-12

## 2018-01-11 RX ORDER — CLOZAPINE 150 MG/1
25 TABLET, ORALLY DISINTEGRATING ORAL AT BEDTIME
Qty: 0 | Refills: 0 | Status: DISCONTINUED | OUTPATIENT
Start: 2018-01-11 | End: 2018-01-11

## 2018-01-11 RX ORDER — HALOPERIDOL DECANOATE 100 MG/ML
5 INJECTION INTRAMUSCULAR ONCE
Qty: 0 | Refills: 0 | Status: COMPLETED | OUTPATIENT
Start: 2018-01-11 | End: 2018-01-11

## 2018-01-11 RX ORDER — DIPHENHYDRAMINE HCL 50 MG
50 CAPSULE ORAL ONCE
Qty: 0 | Refills: 0 | Status: COMPLETED | OUTPATIENT
Start: 2018-01-11 | End: 2018-01-11

## 2018-01-11 RX ADMIN — RIVAROXABAN 10 MILLIGRAM(S): KIT at 09:16

## 2018-01-11 RX ADMIN — Medication 10 MILLIGRAM(S): at 08:16

## 2018-01-11 RX ADMIN — Medication 1 TABLET(S): at 09:16

## 2018-01-11 RX ADMIN — HALOPERIDOL DECANOATE 5 MILLIGRAM(S): 100 INJECTION INTRAMUSCULAR at 10:00

## 2018-01-11 RX ADMIN — CLOZAPINE 25 MILLIGRAM(S): 150 TABLET, ORALLY DISINTEGRATING ORAL at 18:11

## 2018-01-11 RX ADMIN — ATENOLOL 50 MILLIGRAM(S): 25 TABLET ORAL at 09:16

## 2018-01-11 RX ADMIN — Medication 50 MILLIGRAM(S): at 10:00

## 2018-01-11 RX ADMIN — Medication 10 MILLIGRAM(S): at 16:28

## 2018-01-11 RX ADMIN — Medication 10 MILLIGRAM(S): at 12:30

## 2018-01-11 RX ADMIN — Medication 2 MILLIGRAM(S): at 10:00

## 2018-01-11 RX ADMIN — TAMSULOSIN HYDROCHLORIDE 0.4 MILLIGRAM(S): 0.4 CAPSULE ORAL at 18:11

## 2018-01-11 RX ADMIN — Medication 10 MILLIGRAM(S): at 21:10

## 2018-01-11 RX ADMIN — Medication 10 MILLIGRAM(S): at 16:42

## 2018-01-12 PROCEDURE — 99232 SBSQ HOSP IP/OBS MODERATE 35: CPT | Mod: GC

## 2018-01-12 RX ORDER — CLOZAPINE 150 MG/1
50 TABLET, ORALLY DISINTEGRATING ORAL AT BEDTIME
Qty: 0 | Refills: 0 | Status: DISCONTINUED | OUTPATIENT
Start: 2018-01-12 | End: 2018-01-12

## 2018-01-12 RX ORDER — CLOZAPINE 150 MG/1
125 TABLET, ORALLY DISINTEGRATING ORAL AT BEDTIME
Qty: 0 | Refills: 0 | Status: DISCONTINUED | OUTPATIENT
Start: 2018-01-15 | End: 2018-01-16

## 2018-01-12 RX ORDER — CLOZAPINE 150 MG/1
100 TABLET, ORALLY DISINTEGRATING ORAL AT BEDTIME
Qty: 0 | Refills: 0 | Status: COMPLETED | OUTPATIENT
Start: 2018-01-14 | End: 2018-01-14

## 2018-01-12 RX ORDER — CLOZAPINE 150 MG/1
50 TABLET, ORALLY DISINTEGRATING ORAL AT BEDTIME
Qty: 0 | Refills: 0 | Status: COMPLETED | OUTPATIENT
Start: 2018-01-12 | End: 2018-01-12

## 2018-01-12 RX ORDER — CLOZAPINE 150 MG/1
75 TABLET, ORALLY DISINTEGRATING ORAL AT BEDTIME
Qty: 0 | Refills: 0 | Status: COMPLETED | OUTPATIENT
Start: 2018-01-13 | End: 2018-01-13

## 2018-01-12 RX ADMIN — Medication 10 MILLIGRAM(S): at 21:04

## 2018-01-12 RX ADMIN — TAMSULOSIN HYDROCHLORIDE 0.4 MILLIGRAM(S): 0.4 CAPSULE ORAL at 17:26

## 2018-01-12 RX ADMIN — CLOZAPINE 50 MILLIGRAM(S): 150 TABLET, ORALLY DISINTEGRATING ORAL at 17:28

## 2018-01-12 RX ADMIN — Medication 10 MILLIGRAM(S): at 11:02

## 2018-01-12 RX ADMIN — ATENOLOL 50 MILLIGRAM(S): 25 TABLET ORAL at 09:32

## 2018-01-12 RX ADMIN — Medication 1 TABLET(S): at 09:32

## 2018-01-12 RX ADMIN — Medication 10 MILLIGRAM(S): at 16:26

## 2018-01-12 RX ADMIN — RIVAROXABAN 10 MILLIGRAM(S): KIT at 09:32

## 2018-01-12 RX ADMIN — Medication 10 MILLIGRAM(S): at 06:42

## 2018-01-13 RX ADMIN — CLOZAPINE 75 MILLIGRAM(S): 150 TABLET, ORALLY DISINTEGRATING ORAL at 21:26

## 2018-01-13 RX ADMIN — Medication 10 MILLIGRAM(S): at 21:26

## 2018-01-13 RX ADMIN — Medication 10 MILLIGRAM(S): at 16:56

## 2018-01-13 RX ADMIN — Medication 10 MILLIGRAM(S): at 08:55

## 2018-01-13 RX ADMIN — TAMSULOSIN HYDROCHLORIDE 0.4 MILLIGRAM(S): 0.4 CAPSULE ORAL at 18:39

## 2018-01-13 RX ADMIN — RIVAROXABAN 10 MILLIGRAM(S): KIT at 08:55

## 2018-01-13 RX ADMIN — Medication 1 TABLET(S): at 08:55

## 2018-01-13 RX ADMIN — ATENOLOL 50 MILLIGRAM(S): 25 TABLET ORAL at 08:55

## 2018-01-13 RX ADMIN — Medication 10 MILLIGRAM(S): at 11:14

## 2018-01-14 RX ADMIN — RIVAROXABAN 10 MILLIGRAM(S): KIT at 08:58

## 2018-01-14 RX ADMIN — TAMSULOSIN HYDROCHLORIDE 0.4 MILLIGRAM(S): 0.4 CAPSULE ORAL at 17:12

## 2018-01-14 RX ADMIN — Medication 10 MILLIGRAM(S): at 16:58

## 2018-01-14 RX ADMIN — Medication 10 MILLIGRAM(S): at 11:55

## 2018-01-14 RX ADMIN — ATENOLOL 50 MILLIGRAM(S): 25 TABLET ORAL at 08:57

## 2018-01-14 RX ADMIN — Medication 1 TABLET(S): at 08:57

## 2018-01-14 RX ADMIN — CLOZAPINE 100 MILLIGRAM(S): 150 TABLET, ORALLY DISINTEGRATING ORAL at 21:15

## 2018-01-14 RX ADMIN — Medication 10 MILLIGRAM(S): at 07:58

## 2018-01-14 RX ADMIN — Medication 10 MILLIGRAM(S): at 21:15

## 2018-01-15 RX ADMIN — Medication 10 MILLIGRAM(S): at 11:47

## 2018-01-15 RX ADMIN — ATENOLOL 50 MILLIGRAM(S): 25 TABLET ORAL at 09:31

## 2018-01-15 RX ADMIN — Medication 10 MILLIGRAM(S): at 20:54

## 2018-01-15 RX ADMIN — CLOZAPINE 125 MILLIGRAM(S): 150 TABLET, ORALLY DISINTEGRATING ORAL at 20:54

## 2018-01-15 RX ADMIN — Medication 10 MILLIGRAM(S): at 06:28

## 2018-01-15 RX ADMIN — RIVAROXABAN 10 MILLIGRAM(S): KIT at 09:31

## 2018-01-15 RX ADMIN — TAMSULOSIN HYDROCHLORIDE 0.4 MILLIGRAM(S): 0.4 CAPSULE ORAL at 17:26

## 2018-01-15 RX ADMIN — Medication 1 TABLET(S): at 09:31

## 2018-01-15 RX ADMIN — Medication 10 MILLIGRAM(S): at 17:26

## 2018-01-16 PROCEDURE — 99232 SBSQ HOSP IP/OBS MODERATE 35: CPT | Mod: GC

## 2018-01-16 RX ORDER — CLOZAPINE 150 MG/1
150 TABLET, ORALLY DISINTEGRATING ORAL AT BEDTIME
Qty: 0 | Refills: 0 | Status: DISCONTINUED | OUTPATIENT
Start: 2018-01-16 | End: 2018-01-17

## 2018-01-16 RX ADMIN — TAMSULOSIN HYDROCHLORIDE 0.4 MILLIGRAM(S): 0.4 CAPSULE ORAL at 18:08

## 2018-01-16 RX ADMIN — RIVAROXABAN 10 MILLIGRAM(S): KIT at 09:12

## 2018-01-16 RX ADMIN — CLOZAPINE 150 MILLIGRAM(S): 150 TABLET, ORALLY DISINTEGRATING ORAL at 21:04

## 2018-01-16 RX ADMIN — Medication 1 TABLET(S): at 09:12

## 2018-01-16 RX ADMIN — ATENOLOL 50 MILLIGRAM(S): 25 TABLET ORAL at 09:12

## 2018-01-16 RX ADMIN — Medication 10 MILLIGRAM(S): at 16:47

## 2018-01-16 RX ADMIN — Medication 10 MILLIGRAM(S): at 21:04

## 2018-01-16 RX ADMIN — Medication 10 MILLIGRAM(S): at 09:11

## 2018-01-16 RX ADMIN — Medication 10 MILLIGRAM(S): at 12:50

## 2018-01-17 PROCEDURE — 99232 SBSQ HOSP IP/OBS MODERATE 35: CPT | Mod: GC

## 2018-01-17 RX ORDER — CLOZAPINE 150 MG/1
175 TABLET, ORALLY DISINTEGRATING ORAL AT BEDTIME
Qty: 0 | Refills: 0 | Status: DISCONTINUED | OUTPATIENT
Start: 2018-01-17 | End: 2018-01-18

## 2018-01-17 RX ADMIN — Medication 10 MILLIGRAM(S): at 21:33

## 2018-01-17 RX ADMIN — Medication 1 TABLET(S): at 08:56

## 2018-01-17 RX ADMIN — ATENOLOL 50 MILLIGRAM(S): 25 TABLET ORAL at 08:56

## 2018-01-17 RX ADMIN — RIVAROXABAN 10 MILLIGRAM(S): KIT at 08:56

## 2018-01-17 RX ADMIN — Medication 10 MILLIGRAM(S): at 17:00

## 2018-01-17 RX ADMIN — Medication 10 MILLIGRAM(S): at 11:24

## 2018-01-17 RX ADMIN — CLOZAPINE 175 MILLIGRAM(S): 150 TABLET, ORALLY DISINTEGRATING ORAL at 21:33

## 2018-01-17 RX ADMIN — Medication 10 MILLIGRAM(S): at 08:56

## 2018-01-17 RX ADMIN — TAMSULOSIN HYDROCHLORIDE 0.4 MILLIGRAM(S): 0.4 CAPSULE ORAL at 17:00

## 2018-01-18 LAB
BASOPHILS # BLD AUTO: 0.03 K/UL — SIGNIFICANT CHANGE UP (ref 0–0.2)
BASOPHILS NFR BLD AUTO: 0.3 % — SIGNIFICANT CHANGE UP (ref 0–2)
EOSINOPHIL # BLD AUTO: 0.82 K/UL — HIGH (ref 0–0.5)
EOSINOPHIL NFR BLD AUTO: 8 % — HIGH (ref 0–6)
HCT VFR BLD CALC: 52.4 % — HIGH (ref 39–50)
HGB BLD-MCNC: 16.7 G/DL — SIGNIFICANT CHANGE UP (ref 13–17)
IMM GRANULOCYTES # BLD AUTO: 0.02 # — SIGNIFICANT CHANGE UP
IMM GRANULOCYTES NFR BLD AUTO: 0.2 % — SIGNIFICANT CHANGE UP (ref 0–1.5)
LYMPHOCYTES # BLD AUTO: 2.81 K/UL — SIGNIFICANT CHANGE UP (ref 1–3.3)
LYMPHOCYTES # BLD AUTO: 27.4 % — SIGNIFICANT CHANGE UP (ref 13–44)
MCHC RBC-ENTMCNC: 28.8 PG — SIGNIFICANT CHANGE UP (ref 27–34)
MCHC RBC-ENTMCNC: 31.9 % — LOW (ref 32–36)
MCV RBC AUTO: 90.3 FL — SIGNIFICANT CHANGE UP (ref 80–100)
MONOCYTES # BLD AUTO: 0.72 K/UL — SIGNIFICANT CHANGE UP (ref 0–0.9)
MONOCYTES NFR BLD AUTO: 7 % — SIGNIFICANT CHANGE UP (ref 2–14)
NEUTROPHILS # BLD AUTO: 5.86 K/UL — SIGNIFICANT CHANGE UP (ref 1.8–7.4)
NEUTROPHILS NFR BLD AUTO: 57.1 % — SIGNIFICANT CHANGE UP (ref 43–77)
NRBC # FLD: 0 — SIGNIFICANT CHANGE UP
PLATELET # BLD AUTO: 296 K/UL — SIGNIFICANT CHANGE UP (ref 150–400)
PMV BLD: 9.9 FL — SIGNIFICANT CHANGE UP (ref 7–13)
RBC # BLD: 5.8 M/UL — SIGNIFICANT CHANGE UP (ref 4.2–5.8)
RBC # FLD: 14.4 % — SIGNIFICANT CHANGE UP (ref 10.3–14.5)
WBC # BLD: 10.26 K/UL — SIGNIFICANT CHANGE UP (ref 3.8–10.5)
WBC # FLD AUTO: 10.26 K/UL — SIGNIFICANT CHANGE UP (ref 3.8–10.5)

## 2018-01-18 PROCEDURE — 99232 SBSQ HOSP IP/OBS MODERATE 35: CPT | Mod: GC

## 2018-01-18 RX ORDER — CLOZAPINE 150 MG/1
200 TABLET, ORALLY DISINTEGRATING ORAL AT BEDTIME
Qty: 0 | Refills: 0 | Status: DISCONTINUED | OUTPATIENT
Start: 2018-01-18 | End: 2018-01-19

## 2018-01-18 RX ADMIN — ATENOLOL 50 MILLIGRAM(S): 25 TABLET ORAL at 09:03

## 2018-01-18 RX ADMIN — Medication 10 MILLIGRAM(S): at 09:04

## 2018-01-18 RX ADMIN — RIVAROXABAN 10 MILLIGRAM(S): KIT at 09:05

## 2018-01-18 RX ADMIN — CLOZAPINE 200 MILLIGRAM(S): 150 TABLET, ORALLY DISINTEGRATING ORAL at 21:05

## 2018-01-18 RX ADMIN — TAMSULOSIN HYDROCHLORIDE 0.4 MILLIGRAM(S): 0.4 CAPSULE ORAL at 17:34

## 2018-01-18 RX ADMIN — Medication 1 TABLET(S): at 09:04

## 2018-01-18 RX ADMIN — Medication 10 MILLIGRAM(S): at 21:05

## 2018-01-18 RX ADMIN — Medication 10 MILLIGRAM(S): at 17:34

## 2018-01-18 RX ADMIN — Medication 10 MILLIGRAM(S): at 12:53

## 2018-01-19 PROCEDURE — 99232 SBSQ HOSP IP/OBS MODERATE 35: CPT | Mod: GC

## 2018-01-19 RX ORDER — CLOZAPINE 150 MG/1
225 TABLET, ORALLY DISINTEGRATING ORAL AT BEDTIME
Qty: 0 | Refills: 0 | Status: DISCONTINUED | OUTPATIENT
Start: 2018-01-19 | End: 2018-01-19

## 2018-01-19 RX ORDER — CLOZAPINE 150 MG/1
275 TABLET, ORALLY DISINTEGRATING ORAL AT BEDTIME
Qty: 0 | Refills: 0 | Status: DISCONTINUED | OUTPATIENT
Start: 2018-01-21 | End: 2018-01-22

## 2018-01-19 RX ORDER — CLOZAPINE 150 MG/1
225 TABLET, ORALLY DISINTEGRATING ORAL AT BEDTIME
Qty: 0 | Refills: 0 | Status: COMPLETED | OUTPATIENT
Start: 2018-01-19 | End: 2018-01-19

## 2018-01-19 RX ORDER — CLOZAPINE 150 MG/1
250 TABLET, ORALLY DISINTEGRATING ORAL AT BEDTIME
Qty: 0 | Refills: 0 | Status: COMPLETED | OUTPATIENT
Start: 2018-01-20 | End: 2018-01-20

## 2018-01-19 RX ORDER — CLOZAPINE 150 MG/1
300 TABLET, ORALLY DISINTEGRATING ORAL AT BEDTIME
Qty: 0 | Refills: 0 | Status: DISCONTINUED | OUTPATIENT
Start: 2018-01-22 | End: 2018-01-29

## 2018-01-19 RX ADMIN — Medication 10 MILLIGRAM(S): at 06:26

## 2018-01-19 RX ADMIN — Medication 10 MILLIGRAM(S): at 21:53

## 2018-01-19 RX ADMIN — Medication 10 MILLIGRAM(S): at 10:42

## 2018-01-19 RX ADMIN — RIVAROXABAN 10 MILLIGRAM(S): KIT at 08:52

## 2018-01-19 RX ADMIN — TAMSULOSIN HYDROCHLORIDE 0.4 MILLIGRAM(S): 0.4 CAPSULE ORAL at 18:49

## 2018-01-19 RX ADMIN — CLOZAPINE 225 MILLIGRAM(S): 150 TABLET, ORALLY DISINTEGRATING ORAL at 21:53

## 2018-01-19 RX ADMIN — Medication 10 MILLIGRAM(S): at 16:48

## 2018-01-19 RX ADMIN — ATENOLOL 50 MILLIGRAM(S): 25 TABLET ORAL at 08:52

## 2018-01-19 RX ADMIN — Medication 1 TABLET(S): at 08:52

## 2018-01-20 RX ADMIN — TAMSULOSIN HYDROCHLORIDE 0.4 MILLIGRAM(S): 0.4 CAPSULE ORAL at 17:27

## 2018-01-20 RX ADMIN — TAMSULOSIN HYDROCHLORIDE 0.4 MILLIGRAM(S): 0.4 CAPSULE ORAL at 17:21

## 2018-01-20 RX ADMIN — Medication 10 MILLIGRAM(S): at 13:11

## 2018-01-20 RX ADMIN — CLOZAPINE 250 MILLIGRAM(S): 150 TABLET, ORALLY DISINTEGRATING ORAL at 21:38

## 2018-01-20 RX ADMIN — RIVAROXABAN 10 MILLIGRAM(S): KIT at 09:40

## 2018-01-20 RX ADMIN — TAMSULOSIN HYDROCHLORIDE 0.4 MILLIGRAM(S): 0.4 CAPSULE ORAL at 17:31

## 2018-01-20 RX ADMIN — Medication 10 MILLIGRAM(S): at 17:21

## 2018-01-20 RX ADMIN — Medication 10 MILLIGRAM(S): at 08:13

## 2018-01-20 RX ADMIN — Medication 1 TABLET(S): at 09:41

## 2018-01-20 RX ADMIN — ATENOLOL 50 MILLIGRAM(S): 25 TABLET ORAL at 09:41

## 2018-01-20 RX ADMIN — Medication 10 MILLIGRAM(S): at 21:38

## 2018-01-21 RX ADMIN — ATENOLOL 50 MILLIGRAM(S): 25 TABLET ORAL at 08:35

## 2018-01-21 RX ADMIN — Medication 10 MILLIGRAM(S): at 17:22

## 2018-01-21 RX ADMIN — Medication 10 MILLIGRAM(S): at 21:13

## 2018-01-21 RX ADMIN — CLOZAPINE 275 MILLIGRAM(S): 150 TABLET, ORALLY DISINTEGRATING ORAL at 21:13

## 2018-01-21 RX ADMIN — RIVAROXABAN 10 MILLIGRAM(S): KIT at 08:35

## 2018-01-21 RX ADMIN — Medication 10 MILLIGRAM(S): at 12:09

## 2018-01-21 RX ADMIN — Medication 1 TABLET(S): at 08:35

## 2018-01-21 RX ADMIN — Medication 10 MILLIGRAM(S): at 06:32

## 2018-01-21 RX ADMIN — TAMSULOSIN HYDROCHLORIDE 0.4 MILLIGRAM(S): 0.4 CAPSULE ORAL at 17:45

## 2018-01-22 PROCEDURE — 99232 SBSQ HOSP IP/OBS MODERATE 35: CPT | Mod: GC

## 2018-01-22 RX ADMIN — Medication 10 MILLIGRAM(S): at 22:00

## 2018-01-22 RX ADMIN — Medication 10 MILLIGRAM(S): at 12:04

## 2018-01-22 RX ADMIN — RIVAROXABAN 10 MILLIGRAM(S): KIT at 08:40

## 2018-01-22 RX ADMIN — TAMSULOSIN HYDROCHLORIDE 0.4 MILLIGRAM(S): 0.4 CAPSULE ORAL at 18:04

## 2018-01-22 RX ADMIN — Medication 10 MILLIGRAM(S): at 07:01

## 2018-01-22 RX ADMIN — CLOZAPINE 300 MILLIGRAM(S): 150 TABLET, ORALLY DISINTEGRATING ORAL at 22:00

## 2018-01-22 RX ADMIN — Medication 10 MILLIGRAM(S): at 16:34

## 2018-01-22 RX ADMIN — ATENOLOL 50 MILLIGRAM(S): 25 TABLET ORAL at 08:40

## 2018-01-22 RX ADMIN — Medication 1 TABLET(S): at 08:40

## 2018-01-23 PROCEDURE — 99232 SBSQ HOSP IP/OBS MODERATE 35: CPT | Mod: GC

## 2018-01-23 RX ADMIN — ATENOLOL 50 MILLIGRAM(S): 25 TABLET ORAL at 09:12

## 2018-01-23 RX ADMIN — CLOZAPINE 300 MILLIGRAM(S): 150 TABLET, ORALLY DISINTEGRATING ORAL at 21:20

## 2018-01-23 RX ADMIN — RIVAROXABAN 10 MILLIGRAM(S): KIT at 09:12

## 2018-01-23 RX ADMIN — TAMSULOSIN HYDROCHLORIDE 0.4 MILLIGRAM(S): 0.4 CAPSULE ORAL at 17:39

## 2018-01-23 RX ADMIN — Medication 1 TABLET(S): at 09:12

## 2018-01-23 RX ADMIN — Medication 10 MILLIGRAM(S): at 16:39

## 2018-01-23 RX ADMIN — Medication 10 MILLIGRAM(S): at 12:47

## 2018-01-23 RX ADMIN — Medication 10 MILLIGRAM(S): at 06:23

## 2018-01-23 RX ADMIN — Medication 10 MILLIGRAM(S): at 21:20

## 2018-01-24 PROCEDURE — 99232 SBSQ HOSP IP/OBS MODERATE 35: CPT | Mod: GC

## 2018-01-24 RX ADMIN — Medication 1 TABLET(S): at 09:00

## 2018-01-24 RX ADMIN — RIVAROXABAN 10 MILLIGRAM(S): KIT at 09:00

## 2018-01-24 RX ADMIN — Medication 10 MILLIGRAM(S): at 06:34

## 2018-01-24 RX ADMIN — Medication 10 MILLIGRAM(S): at 17:44

## 2018-01-24 RX ADMIN — Medication 10 MILLIGRAM(S): at 21:09

## 2018-01-24 RX ADMIN — Medication 10 MILLIGRAM(S): at 11:20

## 2018-01-24 RX ADMIN — ATENOLOL 50 MILLIGRAM(S): 25 TABLET ORAL at 09:00

## 2018-01-24 RX ADMIN — TAMSULOSIN HYDROCHLORIDE 0.4 MILLIGRAM(S): 0.4 CAPSULE ORAL at 17:45

## 2018-01-24 RX ADMIN — CLOZAPINE 300 MILLIGRAM(S): 150 TABLET, ORALLY DISINTEGRATING ORAL at 21:09

## 2018-01-25 LAB
BASOPHILS # BLD AUTO: 0.05 K/UL — SIGNIFICANT CHANGE UP (ref 0–0.2)
BASOPHILS NFR BLD AUTO: 0.5 % — SIGNIFICANT CHANGE UP (ref 0–2)
EOSINOPHIL # BLD AUTO: 1.76 K/UL — HIGH (ref 0–0.5)
EOSINOPHIL NFR BLD AUTO: 16.6 % — HIGH (ref 0–6)
HCT VFR BLD CALC: 50.2 % — HIGH (ref 39–50)
HGB BLD-MCNC: 16.3 G/DL — SIGNIFICANT CHANGE UP (ref 13–17)
IMM GRANULOCYTES # BLD AUTO: 0.02 # — SIGNIFICANT CHANGE UP
IMM GRANULOCYTES NFR BLD AUTO: 0.2 % — SIGNIFICANT CHANGE UP (ref 0–1.5)
LYMPHOCYTES # BLD AUTO: 2.78 K/UL — SIGNIFICANT CHANGE UP (ref 1–3.3)
LYMPHOCYTES # BLD AUTO: 26.2 % — SIGNIFICANT CHANGE UP (ref 13–44)
MCHC RBC-ENTMCNC: 29.1 PG — SIGNIFICANT CHANGE UP (ref 27–34)
MCHC RBC-ENTMCNC: 32.5 % — SIGNIFICANT CHANGE UP (ref 32–36)
MCV RBC AUTO: 89.6 FL — SIGNIFICANT CHANGE UP (ref 80–100)
MONOCYTES # BLD AUTO: 0.74 K/UL — SIGNIFICANT CHANGE UP (ref 0–0.9)
MONOCYTES NFR BLD AUTO: 7 % — SIGNIFICANT CHANGE UP (ref 2–14)
NEUTROPHILS # BLD AUTO: 5.27 K/UL — SIGNIFICANT CHANGE UP (ref 1.8–7.4)
NEUTROPHILS NFR BLD AUTO: 49.5 % — SIGNIFICANT CHANGE UP (ref 43–77)
NRBC # FLD: 0 — SIGNIFICANT CHANGE UP
PLATELET # BLD AUTO: 237 K/UL — SIGNIFICANT CHANGE UP (ref 150–400)
PMV BLD: 9.6 FL — SIGNIFICANT CHANGE UP (ref 7–13)
RBC # BLD: 5.6 M/UL — SIGNIFICANT CHANGE UP (ref 4.2–5.8)
RBC # FLD: 14.5 % — SIGNIFICANT CHANGE UP (ref 10.3–14.5)
WBC # BLD: 10.62 K/UL — HIGH (ref 3.8–10.5)
WBC # FLD AUTO: 10.62 K/UL — HIGH (ref 3.8–10.5)

## 2018-01-25 PROCEDURE — 99232 SBSQ HOSP IP/OBS MODERATE 35: CPT | Mod: GC

## 2018-01-25 RX ADMIN — ATENOLOL 50 MILLIGRAM(S): 25 TABLET ORAL at 09:25

## 2018-01-25 RX ADMIN — RIVAROXABAN 10 MILLIGRAM(S): KIT at 09:25

## 2018-01-25 RX ADMIN — Medication 10 MILLIGRAM(S): at 12:44

## 2018-01-25 RX ADMIN — CLOZAPINE 300 MILLIGRAM(S): 150 TABLET, ORALLY DISINTEGRATING ORAL at 21:17

## 2018-01-25 RX ADMIN — Medication 10 MILLIGRAM(S): at 17:08

## 2018-01-25 RX ADMIN — Medication 10 MILLIGRAM(S): at 21:17

## 2018-01-25 RX ADMIN — Medication 1 TABLET(S): at 09:25

## 2018-01-25 RX ADMIN — TAMSULOSIN HYDROCHLORIDE 0.4 MILLIGRAM(S): 0.4 CAPSULE ORAL at 17:08

## 2018-01-25 RX ADMIN — Medication 10 MILLIGRAM(S): at 06:38

## 2018-01-26 PROCEDURE — 99232 SBSQ HOSP IP/OBS MODERATE 35: CPT

## 2018-01-26 RX ADMIN — Medication 10 MILLIGRAM(S): at 21:20

## 2018-01-26 RX ADMIN — Medication 10 MILLIGRAM(S): at 12:58

## 2018-01-26 RX ADMIN — Medication 10 MILLIGRAM(S): at 09:16

## 2018-01-26 RX ADMIN — Medication 10 MILLIGRAM(S): at 15:06

## 2018-01-26 RX ADMIN — TAMSULOSIN HYDROCHLORIDE 0.4 MILLIGRAM(S): 0.4 CAPSULE ORAL at 17:07

## 2018-01-26 RX ADMIN — CLOZAPINE 300 MILLIGRAM(S): 150 TABLET, ORALLY DISINTEGRATING ORAL at 21:20

## 2018-01-26 RX ADMIN — RIVAROXABAN 10 MILLIGRAM(S): KIT at 09:15

## 2018-01-26 RX ADMIN — Medication 1 TABLET(S): at 09:15

## 2018-01-26 RX ADMIN — ATENOLOL 50 MILLIGRAM(S): 25 TABLET ORAL at 09:16

## 2018-01-27 LAB
ALBUMIN SERPL ELPH-MCNC: 3.8 G/DL — SIGNIFICANT CHANGE UP (ref 3.3–5)
ALP SERPL-CCNC: 159 U/L — HIGH (ref 40–120)
ALT FLD-CCNC: 17 U/L — SIGNIFICANT CHANGE UP (ref 4–41)
AST SERPL-CCNC: 19 U/L — SIGNIFICANT CHANGE UP (ref 4–40)
BILIRUB SERPL-MCNC: 0.4 MG/DL — SIGNIFICANT CHANGE UP (ref 0.2–1.2)
BUN SERPL-MCNC: 20 MG/DL — SIGNIFICANT CHANGE UP (ref 7–23)
CALCIUM SERPL-MCNC: 8.8 MG/DL — SIGNIFICANT CHANGE UP (ref 8.4–10.5)
CHLORIDE SERPL-SCNC: 103 MMOL/L — SIGNIFICANT CHANGE UP (ref 98–107)
CO2 SERPL-SCNC: 26 MMOL/L — SIGNIFICANT CHANGE UP (ref 22–31)
CREAT SERPL-MCNC: 0.8 MG/DL — SIGNIFICANT CHANGE UP (ref 0.5–1.3)
GLUCOSE SERPL-MCNC: 85 MG/DL — SIGNIFICANT CHANGE UP (ref 70–99)
HAV IGM SER-ACNC: NONREACTIVE — SIGNIFICANT CHANGE UP
HBV CORE IGM SER-ACNC: NONREACTIVE — SIGNIFICANT CHANGE UP
HBV SURFACE AG SER-ACNC: NONREACTIVE — SIGNIFICANT CHANGE UP
HCV AB S/CO SERPL IA: 0.07 S/CO — SIGNIFICANT CHANGE UP
HCV AB SERPL-IMP: SIGNIFICANT CHANGE UP
POTASSIUM SERPL-MCNC: 4.2 MMOL/L — SIGNIFICANT CHANGE UP (ref 3.5–5.3)
POTASSIUM SERPL-SCNC: 4.2 MMOL/L — SIGNIFICANT CHANGE UP (ref 3.5–5.3)
PROT SERPL-MCNC: 7.5 G/DL — SIGNIFICANT CHANGE UP (ref 6–8.3)
SODIUM SERPL-SCNC: 142 MMOL/L — SIGNIFICANT CHANGE UP (ref 135–145)
TSH SERPL-MCNC: 5.85 UIU/ML — HIGH (ref 0.27–4.2)

## 2018-01-27 RX ADMIN — Medication 10 MILLIGRAM(S): at 21:31

## 2018-01-27 RX ADMIN — Medication 10 MILLIGRAM(S): at 18:55

## 2018-01-27 RX ADMIN — Medication 10 MILLIGRAM(S): at 06:17

## 2018-01-27 RX ADMIN — Medication 1 TABLET(S): at 09:06

## 2018-01-27 RX ADMIN — CLOZAPINE 300 MILLIGRAM(S): 150 TABLET, ORALLY DISINTEGRATING ORAL at 21:31

## 2018-01-27 RX ADMIN — TAMSULOSIN HYDROCHLORIDE 0.4 MILLIGRAM(S): 0.4 CAPSULE ORAL at 18:55

## 2018-01-27 RX ADMIN — ATENOLOL 50 MILLIGRAM(S): 25 TABLET ORAL at 09:06

## 2018-01-27 RX ADMIN — Medication 10 MILLIGRAM(S): at 11:51

## 2018-01-27 RX ADMIN — RIVAROXABAN 10 MILLIGRAM(S): KIT at 09:06

## 2018-01-28 RX ADMIN — Medication 10 MILLIGRAM(S): at 21:00

## 2018-01-28 RX ADMIN — CLOZAPINE 300 MILLIGRAM(S): 150 TABLET, ORALLY DISINTEGRATING ORAL at 21:00

## 2018-01-28 RX ADMIN — ATENOLOL 50 MILLIGRAM(S): 25 TABLET ORAL at 09:19

## 2018-01-28 RX ADMIN — Medication 10 MILLIGRAM(S): at 12:45

## 2018-01-28 RX ADMIN — Medication 1 TABLET(S): at 09:19

## 2018-01-28 RX ADMIN — Medication 10 MILLIGRAM(S): at 17:14

## 2018-01-28 RX ADMIN — TAMSULOSIN HYDROCHLORIDE 0.4 MILLIGRAM(S): 0.4 CAPSULE ORAL at 17:14

## 2018-01-28 RX ADMIN — Medication 10 MILLIGRAM(S): at 06:46

## 2018-01-28 RX ADMIN — RIVAROXABAN 10 MILLIGRAM(S): KIT at 09:19

## 2018-01-29 PROCEDURE — 99232 SBSQ HOSP IP/OBS MODERATE 35: CPT | Mod: GC

## 2018-01-29 RX ORDER — CLOZAPINE 150 MG/1
350 TABLET, ORALLY DISINTEGRATING ORAL AT BEDTIME
Qty: 0 | Refills: 0 | Status: DISCONTINUED | OUTPATIENT
Start: 2018-01-29 | End: 2018-02-01

## 2018-01-29 RX ADMIN — Medication 10 MILLIGRAM(S): at 17:21

## 2018-01-29 RX ADMIN — Medication 10 MILLIGRAM(S): at 21:45

## 2018-01-29 RX ADMIN — Medication 1 TABLET(S): at 14:45

## 2018-01-29 RX ADMIN — TAMSULOSIN HYDROCHLORIDE 0.4 MILLIGRAM(S): 0.4 CAPSULE ORAL at 18:21

## 2018-01-29 RX ADMIN — Medication 10 MILLIGRAM(S): at 06:36

## 2018-01-29 RX ADMIN — Medication 10 MILLIGRAM(S): at 14:44

## 2018-01-29 RX ADMIN — ATENOLOL 50 MILLIGRAM(S): 25 TABLET ORAL at 14:45

## 2018-01-29 RX ADMIN — CLOZAPINE 350 MILLIGRAM(S): 150 TABLET, ORALLY DISINTEGRATING ORAL at 21:45

## 2018-01-29 RX ADMIN — RIVAROXABAN 10 MILLIGRAM(S): KIT at 14:45

## 2018-01-30 PROCEDURE — 99232 SBSQ HOSP IP/OBS MODERATE 35: CPT | Mod: GC

## 2018-01-30 RX ORDER — POLYETHYLENE GLYCOL 3350 17 G/17G
17 POWDER, FOR SOLUTION ORAL ONCE
Qty: 0 | Refills: 0 | Status: COMPLETED | OUTPATIENT
Start: 2018-01-30 | End: 2018-01-30

## 2018-01-30 RX ADMIN — Medication 600 MILLIGRAM(S): at 18:45

## 2018-01-30 RX ADMIN — CLOZAPINE 350 MILLIGRAM(S): 150 TABLET, ORALLY DISINTEGRATING ORAL at 20:52

## 2018-01-30 RX ADMIN — Medication 10 MILLIGRAM(S): at 11:24

## 2018-01-30 RX ADMIN — RIVAROXABAN 10 MILLIGRAM(S): KIT at 08:54

## 2018-01-30 RX ADMIN — Medication 600 MILLIGRAM(S): at 11:31

## 2018-01-30 RX ADMIN — TAMSULOSIN HYDROCHLORIDE 0.4 MILLIGRAM(S): 0.4 CAPSULE ORAL at 18:05

## 2018-01-30 RX ADMIN — Medication 1 TABLET(S): at 08:54

## 2018-01-30 RX ADMIN — ATENOLOL 50 MILLIGRAM(S): 25 TABLET ORAL at 08:54

## 2018-01-30 RX ADMIN — Medication 600 MILLIGRAM(S): at 11:58

## 2018-01-30 RX ADMIN — Medication 10 MILLIGRAM(S): at 20:52

## 2018-01-30 RX ADMIN — POLYETHYLENE GLYCOL 3350 17 GRAM(S): 17 POWDER, FOR SOLUTION ORAL at 23:00

## 2018-01-30 RX ADMIN — Medication 10 MILLIGRAM(S): at 08:54

## 2018-01-30 RX ADMIN — Medication 10 MILLIGRAM(S): at 16:42

## 2018-01-31 PROCEDURE — 99232 SBSQ HOSP IP/OBS MODERATE 35: CPT | Mod: GC

## 2018-01-31 RX ORDER — LACTULOSE 10 G/15ML
10 SOLUTION ORAL
Qty: 0 | Refills: 0 | Status: DISCONTINUED | OUTPATIENT
Start: 2018-01-31 | End: 2018-02-01

## 2018-01-31 RX ORDER — CLOZAPINE 150 MG/1
2 TABLET, ORALLY DISINTEGRATING ORAL
Qty: 0 | Refills: 0 | COMMUNITY
Start: 2018-01-31

## 2018-01-31 RX ORDER — RIVAROXABAN 15 MG-20MG
1 KIT ORAL
Qty: 0 | Refills: 0 | COMMUNITY
Start: 2018-01-31

## 2018-01-31 RX ORDER — CLOZAPINE 150 MG/1
7 TABLET, ORALLY DISINTEGRATING ORAL
Qty: 0 | Refills: 0 | COMMUNITY
Start: 2018-01-31

## 2018-01-31 RX ADMIN — TAMSULOSIN HYDROCHLORIDE 0.4 MILLIGRAM(S): 0.4 CAPSULE ORAL at 17:38

## 2018-01-31 RX ADMIN — Medication 10 MILLIGRAM(S): at 21:16

## 2018-01-31 RX ADMIN — CLOZAPINE 350 MILLIGRAM(S): 150 TABLET, ORALLY DISINTEGRATING ORAL at 21:16

## 2018-01-31 RX ADMIN — Medication 1 TABLET(S): at 09:26

## 2018-01-31 RX ADMIN — ATENOLOL 50 MILLIGRAM(S): 25 TABLET ORAL at 09:26

## 2018-01-31 RX ADMIN — Medication 10 MILLIGRAM(S): at 17:38

## 2018-01-31 RX ADMIN — Medication 10 MILLIGRAM(S): at 06:51

## 2018-01-31 RX ADMIN — RIVAROXABAN 10 MILLIGRAM(S): KIT at 09:26

## 2018-01-31 RX ADMIN — Medication 10 MILLIGRAM(S): at 11:57

## 2018-01-31 RX ADMIN — LACTULOSE 10 GRAM(S): 10 SOLUTION ORAL at 14:02

## 2018-02-01 VITALS — TEMPERATURE: 99 F

## 2018-02-01 LAB
BASOPHILS # BLD AUTO: 0.03 K/UL — SIGNIFICANT CHANGE UP (ref 0–0.2)
BASOPHILS NFR BLD AUTO: 0.3 % — SIGNIFICANT CHANGE UP (ref 0–2)
CLOZAPINE SERPL-MCNC: SIGNIFICANT CHANGE UP
EOSINOPHIL # BLD AUTO: 0.77 K/UL — HIGH (ref 0–0.5)
EOSINOPHIL NFR BLD AUTO: 7.2 % — HIGH (ref 0–6)
HCT VFR BLD CALC: 49.5 % — SIGNIFICANT CHANGE UP (ref 39–50)
HGB BLD-MCNC: 16.4 G/DL — SIGNIFICANT CHANGE UP (ref 13–17)
IMM GRANULOCYTES # BLD AUTO: 0.03 # — SIGNIFICANT CHANGE UP
IMM GRANULOCYTES NFR BLD AUTO: 0.3 % — SIGNIFICANT CHANGE UP (ref 0–1.5)
LYMPHOCYTES # BLD AUTO: 1.87 K/UL — SIGNIFICANT CHANGE UP (ref 1–3.3)
LYMPHOCYTES # BLD AUTO: 17.4 % — SIGNIFICANT CHANGE UP (ref 13–44)
MCHC RBC-ENTMCNC: 30.3 PG — SIGNIFICANT CHANGE UP (ref 27–34)
MCHC RBC-ENTMCNC: 33.1 % — SIGNIFICANT CHANGE UP (ref 32–36)
MCV RBC AUTO: 91.3 FL — SIGNIFICANT CHANGE UP (ref 80–100)
MONOCYTES # BLD AUTO: 0.86 K/UL — SIGNIFICANT CHANGE UP (ref 0–0.9)
MONOCYTES NFR BLD AUTO: 8 % — SIGNIFICANT CHANGE UP (ref 2–14)
NEUTROPHILS # BLD AUTO: 7.18 K/UL — SIGNIFICANT CHANGE UP (ref 1.8–7.4)
NEUTROPHILS NFR BLD AUTO: 66.8 % — SIGNIFICANT CHANGE UP (ref 43–77)
NRBC # FLD: 0 — SIGNIFICANT CHANGE UP
PLATELET # BLD AUTO: 231 K/UL — SIGNIFICANT CHANGE UP (ref 150–400)
PMV BLD: 9.8 FL — SIGNIFICANT CHANGE UP (ref 7–13)
RBC # BLD: 5.42 M/UL — SIGNIFICANT CHANGE UP (ref 4.2–5.8)
RBC # FLD: 15.1 % — HIGH (ref 10.3–14.5)
WBC # BLD: 10.74 K/UL — HIGH (ref 3.8–10.5)
WBC # FLD AUTO: 10.74 K/UL — HIGH (ref 3.8–10.5)

## 2018-02-01 PROCEDURE — 99239 HOSP IP/OBS DSCHRG MGMT >30: CPT

## 2018-02-01 RX ADMIN — Medication 10 MILLIGRAM(S): at 06:39

## 2018-02-01 RX ADMIN — Medication 1 TABLET(S): at 06:44

## 2018-02-01 RX ADMIN — ATENOLOL 50 MILLIGRAM(S): 25 TABLET ORAL at 06:44

## 2018-02-01 RX ADMIN — RIVAROXABAN 10 MILLIGRAM(S): KIT at 06:44

## 2018-04-04 ENCOUNTER — INPATIENT (INPATIENT)
Facility: HOSPITAL | Age: 58
LOS: 1 days | Discharge: PSYCHIATRIC OR SUBSTANCE USE FACILITY | End: 2018-04-06
Attending: HOSPITALIST | Admitting: HOSPITALIST
Payer: MEDICAID

## 2018-04-04 VITALS
TEMPERATURE: 98 F | DIASTOLIC BLOOD PRESSURE: 105 MMHG | RESPIRATION RATE: 18 BRPM | SYSTOLIC BLOOD PRESSURE: 146 MMHG | HEART RATE: 106 BPM | OXYGEN SATURATION: 99 %

## 2018-04-04 DIAGNOSIS — K58.9 IRRITABLE BOWEL SYNDROME WITHOUT DIARRHEA: ICD-10-CM

## 2018-04-04 DIAGNOSIS — I10 ESSENTIAL (PRIMARY) HYPERTENSION: ICD-10-CM

## 2018-04-04 DIAGNOSIS — Z29.9 ENCOUNTER FOR PROPHYLACTIC MEASURES, UNSPECIFIED: ICD-10-CM

## 2018-04-04 DIAGNOSIS — N40.0 BENIGN PROSTATIC HYPERPLASIA WITHOUT LOWER URINARY TRACT SYMPTOMS: ICD-10-CM

## 2018-04-04 DIAGNOSIS — R94.31 ABNORMAL ELECTROCARDIOGRAM [ECG] [EKG]: ICD-10-CM

## 2018-04-04 DIAGNOSIS — F20.9 SCHIZOPHRENIA, UNSPECIFIED: ICD-10-CM

## 2018-04-04 DIAGNOSIS — F41.9 ANXIETY DISORDER, UNSPECIFIED: ICD-10-CM

## 2018-04-04 DIAGNOSIS — R11.0 NAUSEA: ICD-10-CM

## 2018-04-04 DIAGNOSIS — I24.9 ACUTE ISCHEMIC HEART DISEASE, UNSPECIFIED: ICD-10-CM

## 2018-04-04 DIAGNOSIS — E03.9 HYPOTHYROIDISM, UNSPECIFIED: ICD-10-CM

## 2018-04-04 DIAGNOSIS — K21.9 GASTRO-ESOPHAGEAL REFLUX DISEASE WITHOUT ESOPHAGITIS: ICD-10-CM

## 2018-04-04 LAB
ALBUMIN SERPL ELPH-MCNC: 3.3 G/DL — SIGNIFICANT CHANGE UP (ref 3.3–5)
ALP SERPL-CCNC: 93 U/L — SIGNIFICANT CHANGE UP (ref 40–120)
ALT FLD-CCNC: 17 U/L — SIGNIFICANT CHANGE UP (ref 4–41)
AST SERPL-CCNC: 54 U/L — HIGH (ref 4–40)
BASOPHILS # BLD AUTO: 0.05 K/UL — SIGNIFICANT CHANGE UP (ref 0–0.2)
BASOPHILS NFR BLD AUTO: 0.5 % — SIGNIFICANT CHANGE UP (ref 0–2)
BILIRUB SERPL-MCNC: 0.3 MG/DL — SIGNIFICANT CHANGE UP (ref 0.2–1.2)
BUN SERPL-MCNC: 22 MG/DL — SIGNIFICANT CHANGE UP (ref 7–23)
BUN SERPL-MCNC: 23 MG/DL — SIGNIFICANT CHANGE UP (ref 7–23)
CALCIUM SERPL-MCNC: 8.4 MG/DL — SIGNIFICANT CHANGE UP (ref 8.4–10.5)
CALCIUM SERPL-MCNC: 8.6 MG/DL — SIGNIFICANT CHANGE UP (ref 8.4–10.5)
CHLORIDE SERPL-SCNC: 105 MMOL/L — SIGNIFICANT CHANGE UP (ref 98–107)
CHLORIDE SERPL-SCNC: 105 MMOL/L — SIGNIFICANT CHANGE UP (ref 98–107)
CK MB BLD-MCNC: 1.49 NG/ML — SIGNIFICANT CHANGE UP (ref 1–6.6)
CK SERPL-CCNC: 100 U/L — SIGNIFICANT CHANGE UP (ref 30–200)
CO2 SERPL-SCNC: 24 MMOL/L — SIGNIFICANT CHANGE UP (ref 22–31)
CO2 SERPL-SCNC: 26 MMOL/L — SIGNIFICANT CHANGE UP (ref 22–31)
CREAT SERPL-MCNC: 0.96 MG/DL — SIGNIFICANT CHANGE UP (ref 0.5–1.3)
CREAT SERPL-MCNC: 0.96 MG/DL — SIGNIFICANT CHANGE UP (ref 0.5–1.3)
EOSINOPHIL # BLD AUTO: 0.47 K/UL — SIGNIFICANT CHANGE UP (ref 0–0.5)
EOSINOPHIL NFR BLD AUTO: 4.5 % — SIGNIFICANT CHANGE UP (ref 0–6)
GLUCOSE SERPL-MCNC: 110 MG/DL — HIGH (ref 70–99)
GLUCOSE SERPL-MCNC: 80 MG/DL — SIGNIFICANT CHANGE UP (ref 70–99)
HCT VFR BLD CALC: 43.7 % — SIGNIFICANT CHANGE UP (ref 39–50)
HGB BLD-MCNC: 14.3 G/DL — SIGNIFICANT CHANGE UP (ref 13–17)
IMM GRANULOCYTES # BLD AUTO: 0.03 # — SIGNIFICANT CHANGE UP
IMM GRANULOCYTES NFR BLD AUTO: 0.3 % — SIGNIFICANT CHANGE UP (ref 0–1.5)
LYMPHOCYTES # BLD AUTO: 2.47 K/UL — SIGNIFICANT CHANGE UP (ref 1–3.3)
LYMPHOCYTES # BLD AUTO: 23.4 % — SIGNIFICANT CHANGE UP (ref 13–44)
MCHC RBC-ENTMCNC: 29.7 PG — SIGNIFICANT CHANGE UP (ref 27–34)
MCHC RBC-ENTMCNC: 32.7 % — SIGNIFICANT CHANGE UP (ref 32–36)
MCV RBC AUTO: 90.9 FL — SIGNIFICANT CHANGE UP (ref 80–100)
MONOCYTES # BLD AUTO: 0.83 K/UL — SIGNIFICANT CHANGE UP (ref 0–0.9)
MONOCYTES NFR BLD AUTO: 7.9 % — SIGNIFICANT CHANGE UP (ref 2–14)
NEUTROPHILS # BLD AUTO: 6.7 K/UL — SIGNIFICANT CHANGE UP (ref 1.8–7.4)
NEUTROPHILS NFR BLD AUTO: 63.4 % — SIGNIFICANT CHANGE UP (ref 43–77)
NRBC # FLD: 0 — SIGNIFICANT CHANGE UP
PLATELET # BLD AUTO: 224 K/UL — SIGNIFICANT CHANGE UP (ref 150–400)
PMV BLD: 10.2 FL — SIGNIFICANT CHANGE UP (ref 7–13)
POTASSIUM SERPL-MCNC: 4.2 MMOL/L — SIGNIFICANT CHANGE UP (ref 3.5–5.3)
POTASSIUM SERPL-MCNC: SIGNIFICANT CHANGE UP MMOL/L (ref 3.5–5.3)
POTASSIUM SERPL-SCNC: 4.2 MMOL/L — SIGNIFICANT CHANGE UP (ref 3.5–5.3)
POTASSIUM SERPL-SCNC: SIGNIFICANT CHANGE UP MMOL/L (ref 3.5–5.3)
PROT SERPL-MCNC: 7.5 G/DL — SIGNIFICANT CHANGE UP (ref 6–8.3)
RBC # BLD: 4.81 M/UL — SIGNIFICANT CHANGE UP (ref 4.2–5.8)
RBC # FLD: 14.1 % — SIGNIFICANT CHANGE UP (ref 10.3–14.5)
SODIUM SERPL-SCNC: 140 MMOL/L — SIGNIFICANT CHANGE UP (ref 135–145)
SODIUM SERPL-SCNC: 142 MMOL/L — SIGNIFICANT CHANGE UP (ref 135–145)
TROPONIN T SERPL-MCNC: < 0.06 NG/ML — SIGNIFICANT CHANGE UP (ref 0–0.06)
TROPONIN T SERPL-MCNC: < 0.06 NG/ML — SIGNIFICANT CHANGE UP (ref 0–0.06)
WBC # BLD: 10.55 K/UL — HIGH (ref 3.8–10.5)
WBC # FLD AUTO: 10.55 K/UL — HIGH (ref 3.8–10.5)

## 2018-04-04 PROCEDURE — 99223 1ST HOSP IP/OBS HIGH 75: CPT | Mod: AI

## 2018-04-04 PROCEDURE — 74018 RADEX ABDOMEN 1 VIEW: CPT | Mod: 26

## 2018-04-04 PROCEDURE — 71046 X-RAY EXAM CHEST 2 VIEWS: CPT | Mod: 26

## 2018-04-04 RX ORDER — DIVALPROEX SODIUM 500 MG/1
1250 TABLET, DELAYED RELEASE ORAL DAILY
Qty: 0 | Refills: 0 | Status: DISCONTINUED | OUTPATIENT
Start: 2018-04-04 | End: 2018-04-06

## 2018-04-04 RX ORDER — RIVAROXABAN 15 MG-20MG
10 KIT ORAL DAILY
Qty: 0 | Refills: 0 | Status: DISCONTINUED | OUTPATIENT
Start: 2018-04-04 | End: 2018-04-06

## 2018-04-04 RX ORDER — ONDANSETRON 8 MG/1
4 TABLET, FILM COATED ORAL EVERY 6 HOURS
Qty: 0 | Refills: 0 | Status: DISCONTINUED | OUTPATIENT
Start: 2018-04-04 | End: 2018-04-06

## 2018-04-04 RX ORDER — CLOZAPINE 150 MG/1
350 TABLET, ORALLY DISINTEGRATING ORAL AT BEDTIME
Qty: 0 | Refills: 0 | Status: DISCONTINUED | OUTPATIENT
Start: 2018-04-04 | End: 2018-04-06

## 2018-04-04 RX ORDER — LEVOTHYROXINE SODIUM 125 MCG
25 TABLET ORAL DAILY
Qty: 0 | Refills: 0 | Status: DISCONTINUED | OUTPATIENT
Start: 2018-04-04 | End: 2018-04-06

## 2018-04-04 RX ORDER — CHOLECALCIFEROL (VITAMIN D3) 125 MCG
2000 CAPSULE ORAL DAILY
Qty: 0 | Refills: 0 | Status: DISCONTINUED | OUTPATIENT
Start: 2018-04-04 | End: 2018-04-06

## 2018-04-04 RX ORDER — LISINOPRIL 2.5 MG/1
2.5 TABLET ORAL DAILY
Qty: 0 | Refills: 0 | Status: DISCONTINUED | OUTPATIENT
Start: 2018-04-04 | End: 2018-04-06

## 2018-04-04 RX ORDER — SENNA PLUS 8.6 MG/1
2 TABLET ORAL AT BEDTIME
Qty: 0 | Refills: 0 | Status: DISCONTINUED | OUTPATIENT
Start: 2018-04-04 | End: 2018-04-06

## 2018-04-04 RX ORDER — PANTOPRAZOLE SODIUM 20 MG/1
40 TABLET, DELAYED RELEASE ORAL
Qty: 0 | Refills: 0 | Status: DISCONTINUED | OUTPATIENT
Start: 2018-04-04 | End: 2018-04-06

## 2018-04-04 RX ORDER — ASPIRIN/CALCIUM CARB/MAGNESIUM 324 MG
81 TABLET ORAL DAILY
Qty: 0 | Refills: 0 | Status: DISCONTINUED | OUTPATIENT
Start: 2018-04-04 | End: 2018-04-06

## 2018-04-04 RX ORDER — ATENOLOL 25 MG/1
50 TABLET ORAL
Qty: 0 | Refills: 0 | Status: DISCONTINUED | OUTPATIENT
Start: 2018-04-04 | End: 2018-04-06

## 2018-04-04 RX ORDER — TAMSULOSIN HYDROCHLORIDE 0.4 MG/1
0.4 CAPSULE ORAL AT BEDTIME
Qty: 0 | Refills: 0 | Status: DISCONTINUED | OUTPATIENT
Start: 2018-04-04 | End: 2018-04-06

## 2018-04-04 RX ORDER — POLYETHYLENE GLYCOL 3350 17 G/17G
17 POWDER, FOR SOLUTION ORAL DAILY
Qty: 0 | Refills: 0 | Status: DISCONTINUED | OUTPATIENT
Start: 2018-04-04 | End: 2018-04-06

## 2018-04-04 RX ORDER — CHOLECALCIFEROL (VITAMIN D3) 125 MCG
2000 CAPSULE ORAL
Qty: 0 | Refills: 0 | Status: DISCONTINUED | OUTPATIENT
Start: 2018-04-04 | End: 2018-04-04

## 2018-04-04 RX ORDER — ACETAMINOPHEN 500 MG
650 TABLET ORAL EVERY 6 HOURS
Qty: 0 | Refills: 0 | Status: DISCONTINUED | OUTPATIENT
Start: 2018-04-04 | End: 2018-04-06

## 2018-04-04 RX ADMIN — RIVAROXABAN 10 MILLIGRAM(S): KIT at 18:28

## 2018-04-04 RX ADMIN — Medication 2000 UNIT(S): at 18:28

## 2018-04-04 RX ADMIN — Medication 75 MILLIGRAM(S): at 18:28

## 2018-04-04 RX ADMIN — DIVALPROEX SODIUM 1250 MILLIGRAM(S): 500 TABLET, DELAYED RELEASE ORAL at 18:28

## 2018-04-04 RX ADMIN — Medication 81 MILLIGRAM(S): at 23:59

## 2018-04-04 RX ADMIN — CLOZAPINE 350 MILLIGRAM(S): 150 TABLET, ORALLY DISINTEGRATING ORAL at 23:58

## 2018-04-04 RX ADMIN — LISINOPRIL 2.5 MILLIGRAM(S): 2.5 TABLET ORAL at 23:59

## 2018-04-04 RX ADMIN — Medication 25 MICROGRAM(S): at 18:28

## 2018-04-04 RX ADMIN — TAMSULOSIN HYDROCHLORIDE 0.4 MILLIGRAM(S): 0.4 CAPSULE ORAL at 20:55

## 2018-04-04 RX ADMIN — Medication 10 MILLIGRAM(S): at 18:28

## 2018-04-04 RX ADMIN — Medication 1 TABLET(S): at 18:28

## 2018-04-04 NOTE — H&P ADULT - PMH
Anxiety    BPH (benign prostatic hyperplasia)    GERD (gastroesophageal reflux disease)    HTN (hypertension)    Hypothyroidism    IBS (irritable bowel syndrome)    Schizophrenia

## 2018-04-04 NOTE — H&P ADULT - RS GEN PE MLT RESP DETAILS PC
clear to auscultation bilaterally/no rhonchi/no wheezes/no rales/breath sounds equal/good air movement/no chest wall tenderness/no intercostal retractions/airway patent/respirations non-labored

## 2018-04-04 NOTE — H&P ADULT - PROBLEM SELECTOR PLAN 1
Admit to telemetry, serial cardiac enzymes, serial EKGs  Check CBC, CMP, TSH, FLP, HgA1C  Pt with no complaints of chest pain  EKG with inferior T wave inversions, which is changed from prior EKG  Start ASA 81mg PO daily  Echocardiogram ordered  Consider ischemic evaluation with JOSÉ ANTONIO  F/U MD note

## 2018-04-04 NOTE — H&P ADULT - NSHPLABSRESULTS_GEN_ALL_CORE
EKG: Sinus bradycardia at 52 bpm, TWI II, III, AVF, V6, LAD, QT/QTc 420/390  CE x1: Negative  WBC: 10.55  AST: 54    4/4 CXR: Underinflated but otherwise clear lungs. No pleural effusions or pneumothorax. Cardiac and mediastinal silhouettes inaccurately assessed on the projection but appear grossly stable. Trachea midline. Spinal degenerative changes again noted.  4/4 AXR: Peripheral right hemiabdomen excluded from view. Nonobstructive bowel gas pattern. Moderate to large amount of stool throughout the visualized colon. No pneumoperitoneum. No abnormal calcifications, solid organomegaly, or gross mass densities. Spinal degenerative change. Advanced right hip osteoarthritis again noted.

## 2018-04-04 NOTE — H&P ADULT - HISTORY OF PRESENT ILLNESS
58 y/o male with a PMHx of HTN, hypothyroidism, GERD, BPH, IBS, fibromyalgia, anxiety and schizoaffective disorder presents to ED from [place] with EKG changes. Pt resides at [facility] [place] for the past four months (as per worker at bedside); however, pt states that he has only been there for "a few days." Pt elicits that he has been feeling intermittently nauseas for the past two days. Pt cannot identify any provoking factors; he does not admit to worsening of nausea with food intake, exertion, activity. Pt only admits that he has been feeling nausea on and off, which has been relieved with Maalox over the counter that he has been taking. Staff at [facility] performed an EKG, which appeared abnormal and different from his most recent EKG. Pt has never had a cardiac workup other than an EKG. Patient's only other complaints is chronic fatigue and chronic pain from his osteoarthritis. According to [facility] worker at bedside, pt is mostly wheelchair bound but does ambulate at [facility]. Per transfer papers, pt is not suicidal and has a history of prior arrest for non-violent crimes. Pt denies fever, chills, recent travel, headache, dizziness, visual deficits, chest pain, shortness of breath, orthopnea, palpitations, abdominal pain, vomiting, diarrhea, constipation, change in bowel habits, hematochezia, melena, dysuria, hematuria, LOC, syncope, peripheral edema. Upon arrival to ED, EKG: Sinus bradycardia at 52 bpm, TWI II, III, AVF, V6, LAD, QT/QTc 420/390. CE x1: Negative. CXR: Underinflated but otherwise clear lungs. No pleural effusions or pneumothorax. Cardiac and mediastinal silhouettes inaccurately assessed on the projection but appear grossly stable. Trachea midline. Spinal degenerative changes again noted. AXR: Peripheral right hemiabdomen excluded from view. Nonobstructive bowel gas pattern. Moderate to large amount of stool throughout the visualized colon. No pneumoperitoneum. No abnormal calcifications, solid organomegaly, or gross mass densities. Spinal degenerative change. Advanced right hip osteoarthritis again noted. Pt is now admitted to telemetry.

## 2018-04-04 NOTE — H&P ADULT - NEGATIVE OPHTHALMOLOGIC SYMPTOMS
no photophobia/no pain R/no blurred vision R/no blurred vision L/no loss of vision L/no loss of vision R/no diplopia/no pain L

## 2018-04-04 NOTE — H&P ADULT - PROBLEM SELECTOR PLAN 7
Pt calm and cooperative at this time  No need for 1:1  Continue psychotropic medications with daily EKG monitoring to assess QT

## 2018-04-04 NOTE — H&P ADULT - NEGATIVE GASTROINTESTINAL SYMPTOMS
no vomiting/no change in bowel habits/no flatulence/no diarrhea/no melena/no abdominal pain/no hematochezia/no constipation

## 2018-04-04 NOTE — ED ADULT NURSE REASSESSMENT NOTE - SYMPTOMS
c.o. some nausea, no vomiting, denies chest pain and sob. cardiac monitor sinus bradycardia. bmp sent as requested.

## 2018-04-04 NOTE — H&P ADULT - PROBLEM SELECTOR PLAN 2
Unclear etiology given no other associated symptoms  Continue Maalox and Zofran PRN as pt reports relief with Maalox  AXR only shows moderate amount of stool in rectum   Continue Dulcolax, Miralax, Senna for bowel regimen

## 2018-04-04 NOTE — H&P ADULT - NSHPSOURCEINFOTX_GEN_ALL_CORE
Medication reconciliation obtained from documentation provided from [place]. Pt able to relate history of present illness.

## 2018-04-04 NOTE — ED ADULT NURSE NOTE - CHIEF COMPLAINT QUOTE
Pt brought to ER from [place] by EMS c/o arthritic pain all over, nauseous, weakness and nurse at [place] is stating he has EKG changes.

## 2018-04-04 NOTE — H&P ADULT - NSHPSOCIALHISTORY_GEN_ALL_CORE
Pt is from [place]; he is a resident there. He denies smoking and drinking. He reports having taken the flu shot this year.

## 2018-04-04 NOTE — H&P ADULT - ASSESSMENT
58 y/o male with a PMHx of HTN, hypothyroidism, GERD, BPH, fibromyalgia, anxiety and schizoaffective disorder presents to ED from [place] with nausea and EKG changes.

## 2018-04-04 NOTE — ED PROVIDER NOTE - OBJECTIVE STATEMENT
58 yo M PMHx schizophrenia, HTN, anxiety sent from [facility] for EKG changes. Pt has been feeling nauseous for 2 days, took some milk of magnesium with some relief. His EKG today at [facility] showed changes from a prior EKG in December 2017 so he was brought to the ER for evaluation. He currently denies any symptoms other than nausea and body aches which are not new and per patient from his "chronic fatigue." He is currently on Clozapine, Atenolol, Dicyclomine, Tamsulosin, and Xarelto per his med rec. He is mostly wheelchair bound but does ambulate daily per [facility] employee who is at bedside. He denies CP/palpitations, fevers/chills, headaches, lightheadedness/dizziness. Denies tobacco, ETOH, drug use or any prior surgeries. 58 yo M PMHx schizophrenia, HTN, anxiety sent from [facility] for EKG changes. Pt has been feeling nauseous for 2 days, took some milk of magnesium with some relief. His EKG today at [facility] showed changes from a prior EKG in December 2017 so he was brought to the ER for evaluation. He currently denies any symptoms other than nausea and body aches which are not new and per patient from his "chronic fatigue." He is currently on Clozapine, Atenolol, Dicyclomine, Tamsulosin, and Xarelto per his med rec. He is mostly wheelchair bound but does ambulate daily per [facility] employee who is at bedside. He denies CP/palpitations, fevers/chills, headaches, lightheadedness/dizziness. Denies tobacco, ETOH, drug use or any prior surgeries.    14:30 Valle att: 57M h/o htn, schizophrenia, anxiety sent from [facility] for EKG changes and nausea. Patient reports intermitt nausea x 2 days. Denies fever, chest pain, dyspnea, exertional sx, abd pain, diarrhea, dysuria. Last bm this morning. Weakness "x 28 years." Per transfer papers NKDA, suicidal, has capacity, voluntary admission, h/o schizoaffective disorder, h/o remote amphetamine dependence, prior arrest and conviction for non-violent crimes, htn, ibs, oa fibromyalgia.

## 2018-04-04 NOTE — ED ADULT NURSE NOTE - OBJECTIVE STATEMENT
Seen by MD Carvalho.  EKG done, sinus bradycardia, at 52bpm.  Waiting to be seen by attending in intake.  Placed onto cm.

## 2018-04-04 NOTE — ED PROVIDER NOTE - MEDICAL DECISION MAKING DETAILS
58 yo M PMHx schizophrenia, HTN, anxiety sent from [facility] for EKG changes and nausea x 2 days, will obtain basic labs + troponin, CXR, CDU vs admission for cardiac work up

## 2018-04-04 NOTE — H&P ADULT - NEGATIVE NEUROLOGICAL SYMPTOMS
no paresthesias/no headache/no syncope/no tremors/no difficulty walking/no focal seizures/no loss of consciousness/no hemiparesis/no transient paralysis/no weakness/no generalized seizures/no confusion/no vertigo/no loss of sensation

## 2018-04-04 NOTE — ED ADULT NURSE NOTE - CHIEF COMPLAINT
The patient is a 57y Male from [facility], with staff, sent for EKG changes. Pt c/o nausea but refused further cardiac evaluation at the facility.   Pt denies any chest pain, sob.  Pmh of htn, ibs, oa, osteroporosis, fibromyalgia, bph, schizoaffective disorder.

## 2018-04-04 NOTE — ED PROVIDER NOTE - ATTENDING CONTRIBUTION TO CARE
Dr. Valle: I have personally seen and examined this patient at the bedside. I have fully participated in the care of this patient. I have reviewed all pertinent clinical information, including history, physical exam, plan and the Resident's note and agree except as noted. HPI above as by me. PE above as by me. DDX 57M h/o htn c/o intermitt nausea EKG with flipped t=waves in inferior leads. Limited historian. Concern for acs. PLAN labs, cxr, admit tele for r/o mi. Patient aware and amenable. Will initiate 1:1 upon admission after [facility] aides leave. Dr. Valle: I have personally seen and examined this patient at the bedside. I have fully participated in the care of this patient. I have reviewed all pertinent clinical information, including history, physical exam, plan and the Resident's note and agree except as noted. HPI above as by me. PE above as by me. DDX 57M h/o htn c/o intermitt nausea EKG with flipped t=waves in inferior leads. Limited historian. Concern for acs. PLAN labs, cxr, admit tele for r/o mi. Patient aware and amenable. 2 [facility] aides at bedside.

## 2018-04-04 NOTE — ED ADULT TRIAGE NOTE - CHIEF COMPLAINT QUOTE
Pt brought to ER from [place] by EMS c/o arthritic pain all over, nauseous, weakness and nurse at [place] is stating he has EKG changes.

## 2018-04-04 NOTE — H&P ADULT - NEGATIVE CARDIOVASCULAR SYMPTOMS
no palpitations/no orthopnea/no paroxysmal nocturnal dyspnea/no peripheral edema/no claudication/no dyspnea on exertion/no chest pain

## 2018-04-04 NOTE — H&P ADULT - ATTENDING COMMENTS
Pt awake, alert, comfortable, denied cp/sob/palpitation/dizziness.   Nausea sensation for past 2 days, no vomiting or diarrhea. Can't think of any trigging event.   EKG with T wave inversion, serial EKG, trend CE, further ischemic work up inpatient vs outpatient. Consider card consul in AM Pt awake, alert, comfortable, denied cp/sob/palpitation/dizziness.   Nausea sensation for past 2 days, no vomiting or diarrhea. Can't think of any trigging event.   EKG with T wave inversion, serial EKG, trend CE, Tele  No documented significant cardiac risk factors. further ischemic work up inpatient vs outpatient. Consider card consul in AM

## 2018-04-04 NOTE — ED PROVIDER NOTE - NS ED ROS FT
GENERAL: No fever or chills, generalized weakness, EYES: no change in vision, HEENT: no trouble swallowing or speaking, CARDIAC: no chest pain, PULMONARY: no cough or SOB, GI: no abdominal pain, +nausea, no vomiting, no diarrhea or constipation, : No changes in urination, SKIN: no rashes, NEURO: no headache,  MSK: No joint pain ~Maia Cunningham M.D. Resident

## 2018-04-05 ENCOUNTER — TRANSCRIPTION ENCOUNTER (OUTPATIENT)
Age: 58
End: 2018-04-05

## 2018-04-05 DIAGNOSIS — F20.9 SCHIZOPHRENIA, UNSPECIFIED: ICD-10-CM

## 2018-04-05 DIAGNOSIS — M79.7 FIBROMYALGIA: ICD-10-CM

## 2018-04-05 LAB
BUN SERPL-MCNC: 21 MG/DL — SIGNIFICANT CHANGE UP (ref 7–23)
CALCIUM SERPL-MCNC: 8.1 MG/DL — LOW (ref 8.4–10.5)
CHLORIDE SERPL-SCNC: 107 MMOL/L — SIGNIFICANT CHANGE UP (ref 98–107)
CHOLEST SERPL-MCNC: 149 MG/DL — SIGNIFICANT CHANGE UP (ref 120–199)
CO2 SERPL-SCNC: 24 MMOL/L — SIGNIFICANT CHANGE UP (ref 22–31)
CREAT SERPL-MCNC: 1.06 MG/DL — SIGNIFICANT CHANGE UP (ref 0.5–1.3)
GLUCOSE SERPL-MCNC: 89 MG/DL — SIGNIFICANT CHANGE UP (ref 70–99)
HBA1C BLD-MCNC: 5.3 % — SIGNIFICANT CHANGE UP (ref 4–5.6)
HCT VFR BLD CALC: 43.4 % — SIGNIFICANT CHANGE UP (ref 39–50)
HDLC SERPL-MCNC: 24 MG/DL — LOW (ref 35–55)
HGB BLD-MCNC: 14.4 G/DL — SIGNIFICANT CHANGE UP (ref 13–17)
LIPID PNL WITH DIRECT LDL SERPL: 106 MG/DL — SIGNIFICANT CHANGE UP
MAGNESIUM SERPL-MCNC: 2.2 MG/DL — SIGNIFICANT CHANGE UP (ref 1.6–2.6)
MCHC RBC-ENTMCNC: 30.3 PG — SIGNIFICANT CHANGE UP (ref 27–34)
MCHC RBC-ENTMCNC: 33.2 % — SIGNIFICANT CHANGE UP (ref 32–36)
MCV RBC AUTO: 91.2 FL — SIGNIFICANT CHANGE UP (ref 80–100)
NRBC # FLD: 0 — SIGNIFICANT CHANGE UP
PLATELET # BLD AUTO: 208 K/UL — SIGNIFICANT CHANGE UP (ref 150–400)
PMV BLD: 10.3 FL — SIGNIFICANT CHANGE UP (ref 7–13)
POTASSIUM SERPL-MCNC: 4.5 MMOL/L — SIGNIFICANT CHANGE UP (ref 3.5–5.3)
POTASSIUM SERPL-SCNC: 4.5 MMOL/L — SIGNIFICANT CHANGE UP (ref 3.5–5.3)
RBC # BLD: 4.76 M/UL — SIGNIFICANT CHANGE UP (ref 4.2–5.8)
RBC # FLD: 14.2 % — SIGNIFICANT CHANGE UP (ref 10.3–14.5)
SODIUM SERPL-SCNC: 144 MMOL/L — SIGNIFICANT CHANGE UP (ref 135–145)
T4 FREE SERPL-MCNC: 1.16 NG/DL — SIGNIFICANT CHANGE UP (ref 0.9–1.8)
TRIGL SERPL-MCNC: 150 MG/DL — HIGH (ref 10–149)
TSH SERPL-MCNC: 4.91 UIU/ML — HIGH (ref 0.27–4.2)
WBC # BLD: 7.02 K/UL — SIGNIFICANT CHANGE UP (ref 3.8–10.5)
WBC # FLD AUTO: 7.02 K/UL — SIGNIFICANT CHANGE UP (ref 3.8–10.5)

## 2018-04-05 PROCEDURE — 93010 ELECTROCARDIOGRAM REPORT: CPT

## 2018-04-05 PROCEDURE — 99233 SBSQ HOSP IP/OBS HIGH 50: CPT

## 2018-04-05 PROCEDURE — 90792 PSYCH DIAG EVAL W/MED SRVCS: CPT

## 2018-04-05 RX ORDER — DOCUSATE SODIUM 100 MG
100 CAPSULE ORAL THREE TIMES A DAY
Qty: 0 | Refills: 0 | Status: DISCONTINUED | OUTPATIENT
Start: 2018-04-05 | End: 2018-04-06

## 2018-04-05 RX ORDER — ASPIRIN/CALCIUM CARB/MAGNESIUM 324 MG
1 TABLET ORAL
Qty: 0 | Refills: 0 | COMMUNITY
Start: 2018-04-05

## 2018-04-05 RX ADMIN — Medication 25 MICROGRAM(S): at 06:01

## 2018-04-05 RX ADMIN — Medication 10 MILLIGRAM(S): at 11:20

## 2018-04-05 RX ADMIN — Medication 75 MILLIGRAM(S): at 17:32

## 2018-04-05 RX ADMIN — LISINOPRIL 2.5 MILLIGRAM(S): 2.5 TABLET ORAL at 06:02

## 2018-04-05 RX ADMIN — Medication 1 TABLET(S): at 11:21

## 2018-04-05 RX ADMIN — Medication 10 MILLIGRAM(S): at 17:32

## 2018-04-05 RX ADMIN — CLOZAPINE 350 MILLIGRAM(S): 150 TABLET, ORALLY DISINTEGRATING ORAL at 21:57

## 2018-04-05 RX ADMIN — RIVAROXABAN 10 MILLIGRAM(S): KIT at 11:20

## 2018-04-05 RX ADMIN — PANTOPRAZOLE SODIUM 40 MILLIGRAM(S): 20 TABLET, DELAYED RELEASE ORAL at 06:02

## 2018-04-05 RX ADMIN — Medication 75 MILLIGRAM(S): at 06:02

## 2018-04-05 RX ADMIN — Medication 2000 UNIT(S): at 13:04

## 2018-04-05 RX ADMIN — Medication 81 MILLIGRAM(S): at 11:21

## 2018-04-05 RX ADMIN — DIVALPROEX SODIUM 1250 MILLIGRAM(S): 500 TABLET, DELAYED RELEASE ORAL at 11:22

## 2018-04-05 RX ADMIN — TAMSULOSIN HYDROCHLORIDE 0.4 MILLIGRAM(S): 0.4 CAPSULE ORAL at 21:57

## 2018-04-05 RX ADMIN — Medication 10 MILLIGRAM(S): at 06:01

## 2018-04-05 RX ADMIN — POLYETHYLENE GLYCOL 3350 17 GRAM(S): 17 POWDER, FOR SOLUTION ORAL at 11:20

## 2018-04-05 NOTE — CONSULT NOTE ADULT - SUBJECTIVE AND OBJECTIVE BOX
CHIEF COMPLAINT: Nausea    HISTORY OF PRESENT ILLNESS:  This is a 57 year old man with HTN,  fibromyalgia, anxiety and schizoaffective disorder who presented to [facility] ED from [place] on 4/4/2018 with EKG changes. Mr. St has been feeling nauseated for the past two days. Staff at [place] performed an EKG, which appeared abnormal and different from his most recent EKG. Pt has never had a cardiac workup other than an EKG. He denies chest pain or SOB. Pt is now admitted to telemetry.       Allergies  No Known Allergies  	    MEDICATIONS:  aspirin enteric coated 81 milliGRAM(s) Oral daily  ATENolol  Tablet 50 milliGRAM(s) Oral two times a day  lisinopril 2.5 milliGRAM(s) Oral daily  rivaroxaban 10 milliGRAM(s) Oral daily  tamsulosin 0.4 milliGRAM(s) Oral at bedtime  acetaminophen   Tablet. 650 milliGRAM(s) Oral every 6 hours PRN  cloZAPine 350 milliGRAM(s) Oral at bedtime  diVALproex ER 1250 milliGRAM(s) Oral daily  ondansetron    Tablet 4 milliGRAM(s) Oral every 6 hours PRN  pregabalin 75 milliGRAM(s) Oral two times a day  aluminum hydroxide/magnesium hydroxide/simethicone Suspension 30 milliLiter(s) Oral every 4 hours PRN  bisacodyl 5 milliGRAM(s) Oral every 12 hours PRN  dicyclomine 10 milliGRAM(s) Oral three times a day before meals  docusate sodium 100 milliGRAM(s) Oral three times a day  pantoprazole    Tablet 40 milliGRAM(s) Oral before breakfast  polyethylene glycol 3350 17 Gram(s) Oral daily  senna 2 Tablet(s) Oral at bedtime  levothyroxine 25 MICROGram(s) Oral daily    cholecalciferol 2000 Unit(s) Oral daily  multivitamin 1 Tablet(s) Oral daily      PAST MEDICAL & SURGICAL HISTORY:  Hypothyroidism  Anxiety  BPH (benign prostatic hyperplasia)  GERD (gastroesophageal reflux disease)  IBS (irritable bowel syndrome)  Schizophrenia  HTN (hypertension)  No significant past surgical history      FAMILY HISTORY:  No pertinent family history in first degree relatives      SOCIAL HISTORY:    Non-smoker      REVIEW OF SYSTEMS:  See HPI, otherwise complete 10 point review of systems negative      PHYSICAL EXAM:  T(C): 36.8 (04-05-18 @ 11:05), Max: 36.8 (04-04-18 @ 18:23)  HR: 71 (04-05-18 @ 11:05) (53 - 71)  BP: 98/64 (04-05-18 @ 11:05) (98/64 - 141/79)  RR: 16 (04-05-18 @ 11:05) (16 - 18)  SpO2: 97% (04-05-18 @ 11:05) (97% - 99%)  Wt(kg): --  I&O's Summary    04 Apr 2018 07:01  -  05 Apr 2018 07:00  --------------------------------------------------------  IN: 0 mL / OUT: 550 mL / NET: -550 mL        Appearance: No Acute Distress	  HEENT:  Normal oral mucosa, PERRL, EOMI	  Cardiovascular: Normal S1 S2, No JVD, No murmurs/rubs/gallops  Respiratory: Lungs clear to auscultation bilaterally  Gastrointestinal:  Soft, Non-tender, + BS	  Skin: No rashes, No ecchymoses, No cyanosis	  Neurologic: Non-focal  Extremities: No clubbing, cyanosis or edema  Vascular: Peripheral pulses palpable 2+ bilaterally  Psychiatry: A & O x 3, Mood & affect appropriate    Laboratory Data:	 	    CBC Full  -  ( 05 Apr 2018 06:00 )  WBC Count : 7.02 K/uL  Hemoglobin : 14.4 g/dL  Hematocrit : 43.4 %  Platelet Count - Automated : 208 K/uL  Mean Cell Volume : 91.2 fL  Mean Cell Hemoglobin : 30.3 pg  Mean Cell Hemoglobin Concentration : 33.2 %  Auto Neutrophil # : x  Auto Lymphocyte # : x  Auto Monocyte # : x  Auto Eosinophil # : x  Auto Basophil # : x  Auto Neutrophil % : x  Auto Lymphocyte % : x  Auto Monocyte % : x  Auto Eosinophil % : x  Auto Basophil % : x    04-05    144  |  107  |  21  ----------------------------<  89  4.5   |  24  |  1.06  04-04    142  |  105  |  23  ----------------------------<  110<H>  4.2   |  26  |  0.96    Ca    8.1<L>      05 Apr 2018 06:45  Ca    8.4      04 Apr 2018 17:01  Mg     2.2     04-05    TPro  7.5  /  Alb  3.3  /  TBili  0.3  /  DBili  x   /  AST  54<H>  /  ALT  17  /  AlkPhos  93  04-04      Interpretation of Telemetry: Sinus; no ectopy	    ECG:  Sinus; non-specific IVCD	  	    Assessment: 57 year old man with HTN, anxiety and schizoaffective d/o presents with nausea and abnormal EKG.     Plan of Care:    #Abnormal EKG-  As compared to EKG dated 12/17/2017, patient has a pre-existing intraventricular conduction delay, has developed new t-wave inversions in the inferior leads.    #HTN- BP at goal on current regimen.    90 minutes spent on total encounter; more than 50% of the visit was spent counseling and/or coordinating care by the attending physician.   	  Adonis Cheung MD Located within [facility]  Cardiovascular Diseases  [phone] CHIEF COMPLAINT: Nausea    HISTORY OF PRESENT ILLNESS:  This is a 57 year old man with HTN,  fibromyalgia, anxiety and schizoaffective disorder who presented to [facility] ED from [place] on 4/4/2018 with EKG changes. Mr. St has been feeling nauseated for the past two days. Staff at [place] performed an EKG, which appeared abnormal and different from his most recent EKG. Pt has never had a cardiac workup other than an EKG. He denies chest pain or SOB. Pt is now admitted to telemetry.       Allergies  No Known Allergies  	    MEDICATIONS:  aspirin enteric coated 81 milliGRAM(s) Oral daily  ATENolol  Tablet 50 milliGRAM(s) Oral two times a day  lisinopril 2.5 milliGRAM(s) Oral daily  rivaroxaban 10 milliGRAM(s) Oral daily  tamsulosin 0.4 milliGRAM(s) Oral at bedtime  acetaminophen   Tablet. 650 milliGRAM(s) Oral every 6 hours PRN  cloZAPine 350 milliGRAM(s) Oral at bedtime  diVALproex ER 1250 milliGRAM(s) Oral daily  ondansetron    Tablet 4 milliGRAM(s) Oral every 6 hours PRN  pregabalin 75 milliGRAM(s) Oral two times a day  aluminum hydroxide/magnesium hydroxide/simethicone Suspension 30 milliLiter(s) Oral every 4 hours PRN  bisacodyl 5 milliGRAM(s) Oral every 12 hours PRN  dicyclomine 10 milliGRAM(s) Oral three times a day before meals  docusate sodium 100 milliGRAM(s) Oral three times a day  pantoprazole    Tablet 40 milliGRAM(s) Oral before breakfast  polyethylene glycol 3350 17 Gram(s) Oral daily  senna 2 Tablet(s) Oral at bedtime  levothyroxine 25 MICROGram(s) Oral daily    cholecalciferol 2000 Unit(s) Oral daily  multivitamin 1 Tablet(s) Oral daily      PAST MEDICAL & SURGICAL HISTORY:  Hypothyroidism  Anxiety  BPH (benign prostatic hyperplasia)  GERD (gastroesophageal reflux disease)  IBS (irritable bowel syndrome)  Schizophrenia  HTN (hypertension)  No significant past surgical history      FAMILY HISTORY:  No pertinent family history in first degree relatives      SOCIAL HISTORY:    Non-smoker      REVIEW OF SYSTEMS:  See HPI, otherwise complete 10 point review of systems negative      PHYSICAL EXAM:  T(C): 36.8 (04-05-18 @ 11:05), Max: 36.8 (04-04-18 @ 18:23)  HR: 71 (04-05-18 @ 11:05) (53 - 71)  BP: 98/64 (04-05-18 @ 11:05) (98/64 - 141/79)  RR: 16 (04-05-18 @ 11:05) (16 - 18)  SpO2: 97% (04-05-18 @ 11:05) (97% - 99%)  Wt(kg): --  I&O's Summary    04 Apr 2018 07:01  -  05 Apr 2018 07:00  --------------------------------------------------------  IN: 0 mL / OUT: 550 mL / NET: -550 mL        Appearance: No Acute Distress	  HEENT:  Normal oral mucosa, PERRL, EOMI	  Cardiovascular: Normal S1 S2, No JVD, No murmurs/rubs/gallops  Respiratory: Lungs clear to auscultation bilaterally  Gastrointestinal:  Soft, Non-tender, + BS	  Skin: No rashes, No ecchymoses, No cyanosis	  Neurologic: Non-focal  Extremities: No clubbing, cyanosis or edema  Vascular: Peripheral pulses palpable 2+ bilaterally  Psychiatry: A & O x 3, Mood & affect appropriate    Laboratory Data:	 	    CBC Full  -  ( 05 Apr 2018 06:00 )  WBC Count : 7.02 K/uL  Hemoglobin : 14.4 g/dL  Hematocrit : 43.4 %  Platelet Count - Automated : 208 K/uL  Mean Cell Volume : 91.2 fL  Mean Cell Hemoglobin : 30.3 pg  Mean Cell Hemoglobin Concentration : 33.2 %  Auto Neutrophil # : x  Auto Lymphocyte # : x  Auto Monocyte # : x  Auto Eosinophil # : x  Auto Basophil # : x  Auto Neutrophil % : x  Auto Lymphocyte % : x  Auto Monocyte % : x  Auto Eosinophil % : x  Auto Basophil % : x    04-05    144  |  107  |  21  ----------------------------<  89  4.5   |  24  |  1.06  04-04    142  |  105  |  23  ----------------------------<  110<H>  4.2   |  26  |  0.96    Ca    8.1<L>      05 Apr 2018 06:45  Ca    8.4      04 Apr 2018 17:01  Mg     2.2     04-05    TPro  7.5  /  Alb  3.3  /  TBili  0.3  /  DBili  x   /  AST  54<H>  /  ALT  17  /  AlkPhos  93  04-04      Interpretation of Telemetry: Sinus; no ectopy	    ECG:  Sinus; non-specific IVCD	  	    Assessment: 57 year old man with HTN, anxiety and schizoaffective d/o presents with nausea and abnormal EKG.     Plan of Care:    #Abnormal EKG-  As compared to EKG dated 12/17/2017, patient has developed new t-wave inversions in the inferior leads (intraventricular conduction delay is not new).  Mr. St denies symptoms of angina.  Would hold off on further ischemic evaluation given lack of symptoms.   TTE may be done as outpatient.    #HTN- BP at goal on current regimen.    Discussed with Tele PA.    90 minutes spent on total encounter; more than 50% of the visit was spent counseling and/or coordinating care by the attending physician.   	  Adonis Cheung MD [facility]  Cardiovascular Diseases  [phone]

## 2018-04-05 NOTE — BEHAVIORAL HEALTH ASSESSMENT NOTE - HPI (INCLUDE ILLNESS QUALITY, SEVERITY, DURATION, TIMING, CONTEXT, MODIFYING FACTORS, ASSOCIATED SIGNS AND SYMPTOMS)
58 yo male with PMHx of HTN, hypothyroidism, GERD, BPH, IBS, fibromyalgia, anxiety and schizoaffective disorder presents to ED from [facility] with EKG changes. Psych consulted because pt is on Clozapine and requires monitoring. Pt reports feeling nauseous for the last 2 days without episodes of emesis. Nonobstructive bowel gas pattern found on abdominal x-ray. Pt reports fair appetite and regular bowel movements. Pt is unaware that he has resided at [facility] for the past 4 months but believes he was only there for 2 days. He states that he has family and a fiance (Suzy) in [place] that he talks to regularly.     On exam, he was AAOx3. Examined at bedside as he ate lunch. Pt was cooperative at first then grew more and more reluctant to answer questions until finally stopping the interview. During the interview, he was limited with his answers. His thoughts were linear and his speech was organized. Maintained eye contact. Grew agitated at some questions. He was able to vocalize understanding of condition and reason for hospitalization. Denies SI/HI/AH/VH. Pt. is 56 yo male with PMHx of HTN, hypothyroidism, GERD, BPH, IBS, fibromyalgia, anxiety and schizoaffective disorder presents to ED from [place] inpatient unit 8A with EKG changes. Psych consulted because pt is on Clozapine and requires monitoring. Pt reports feeling nauseous for the last 2 days without episodes of emesis. Nonobstructive bowel gas pattern found on abdominal x-ray. Pt reports fair appetite and regular bowel movements. Pt is unaware that he has resided at [facility] for the past 4 months but believes he was only there for 2 days. He states that he has family and a fiance in [place] that he talks to regularly.     On exam, he was calm, at times irritable. Interviewed at bedside as he ate lunch.  During the interview, he was limited with his answers. His thoughts were overall linear and his speech was organized. Maintained eye contact. Became irritable at some questions. He was able to vocalize understanding of condition and reason for hospitalization. Denies SI/HI/AH/VH. Stated that he is feeling safe in the hospital.

## 2018-04-05 NOTE — BEHAVIORAL HEALTH ASSESSMENT NOTE - NSBHSUICPROTECTFACT_PSY_A_CORE
Supportive social network or family Supportive social network or family/Identifies reasons for living/Future oriented

## 2018-04-05 NOTE — DISCHARGE NOTE ADULT - CARE PLAN
Principal Discharge DX:	Abnormal EKG  Goal:	Continue to monitor  Assessment and plan of treatment:	Follow-up with your Private Medical Doctor within 1 week.   Continue current medications.  Secondary Diagnosis:	HTN (hypertension)  Goal:	To maintain a normal blood pressure to prevent heart attack, stroke and renal failure.  Assessment and plan of treatment:	Low sodium and fat diet, continue anti-hypertensive medications, and follow up with primary care physician.  Secondary Diagnosis:	Hypothyroidism  Goal:	Monitor thyroid function.  Assessment and plan of treatment:	Continue Levothyroxine.  Secondary Diagnosis:	Schizophrenia  Goal:	Continue to monitor.  Assessment and plan of treatment:	Transfer back to [facility].   Continue current medications.

## 2018-04-05 NOTE — DISCHARGE NOTE ADULT - PROVIDER TOKENS
FREE:[LAST:[Follow-up:],PHONE:[(   )    -],FAX:[(   )    -],ADDRESS:[with your Private Medical Doctor within 1 week.]]

## 2018-04-05 NOTE — BEHAVIORAL HEALTH ASSESSMENT NOTE - NSBHCHARTREVIEWVS_PSY_A_CORE FT
ICU Vital Signs Last 24 Hrs  T(C): 36.8 (05 Apr 2018 11:05), Max: 36.8 (04 Apr 2018 18:23)  T(F): 98.2 (05 Apr 2018 11:05), Max: 98.2 (04 Apr 2018 18:23)  HR: 71 (05 Apr 2018 11:05) (53 - 71)  BP: 98/64 (05 Apr 2018 11:05) (98/64 - 141/79)  BP(mean): --  ABP: --  ABP(mean): --  RR: 16 (05 Apr 2018 11:05) (16 - 18)  SpO2: 97% (05 Apr 2018 11:05) (97% - 99%)

## 2018-04-05 NOTE — BEHAVIORAL HEALTH ASSESSMENT NOTE - NSBHCHARTREVIEWLAB_PSY_A_CORE FT
14.4   7.02  )-----------( 208      ( 05 Apr 2018 06:00 )             43.4   04-05    144  |  107  |  21  ----------------------------<  89  4.5   |  24  |  1.06    Ca    8.1<L>      05 Apr 2018 06:45  Mg     2.2     04-05    TPro  7.5  /  Alb  3.3  /  TBili  0.3  /  DBili  x   /  AST  54<H>  /  ALT  17  /  AlkPhos  93  04-04

## 2018-04-05 NOTE — DISCHARGE NOTE ADULT - PATIENT PORTAL LINK FT
You can access the [facility] Patient Portal, offered by [facility], by registering with the following website: [email or web address]

## 2018-04-05 NOTE — PROGRESS NOTE ADULT - PROBLEM SELECTOR PLAN 1
-CE negative x 2, unlikely ACS  -Check TTE  -c/w tele monitoring for now  -Cards c/s (Dr. Cheung) -CE negative x 2, unlikely ACS  -Check TTE  -c/w tele monitoring for now  -Continue with Asa, lisinopril, and atenolol for now  -Cards c/s (Dr. Cheung)

## 2018-04-05 NOTE — DISCHARGE NOTE ADULT - PLAN OF CARE
Continue to monitor Follow-up with your Private Medical Doctor within 1 week.   Continue current medications. To maintain a normal blood pressure to prevent heart attack, stroke and renal failure. Low sodium and fat diet, continue anti-hypertensive medications, and follow up with primary care physician. Monitor thyroid function. Continue Levothyroxine. Continue to monitor. Transfer back to [facility].   Continue current medications.

## 2018-04-05 NOTE — DISCHARGE NOTE ADULT - HOSPITAL COURSE
58 y/o male with a PMHx of HTN, hypothyroidism, GERD, BPH, fibromyalgia, anxiety and schizoaffective disorder presents to ED from [place] with nausea and EKG changes.     + EKG changes (inferior T wave inversions)- monitor on tele, pending echo  + Nausea- on Zofran and Maalox PRN  + Moderate to large amount of stool throughout the visualized colon- bowel regimen  EKG: Sinus bradycardia at 52 bpm, TWI II, III, AVF, V6, LAD, QT/QTc 420/390  CE x2 Negative  WBC: 10.55  AST: 54    4/4 CXR: Underinflated but otherwise clear lungs. No pleural effusions or pneumothorax. Cardiac and mediastinal silhouettes inaccurately assessed on the projection but appear grossly stable. Trachea midline. Spinal degenerative changes again noted.  4/4 AXR: Peripheral right hemiabdomen excluded from view. Nonobstructive bowel gas pattern. Moderate to large amount of stool throughout the visualized colon. No pneumoperitoneum. No abnormal calcifications, solid organomegaly, or gross mass densities. Spinal degenerative change. Advanced right hip osteoarthritis again noted.  4/5 Cardio c/s; As compared to EKG dated 12/17/2017, patient has developed new t-wave inversions in the inferior leads (intraventricular conduction delay is not new).  Mr. St denies symptoms of angina.  Would hold off on further ischemic evaluation given lack of symptoms.   TTE may be done as outpatient.  psych: ; Continue Clozapine 350mg PO qbedtime and Depakote 1250mg PO daily. PRN: Haldol 2 mg PO/IM/IV q6h PRN for agitation and Ativan 1mg PO/IM/IV q6h PRN for agitation. Continue to monitor WBCs, ANC, qtc as pt on Clozapine, monitor LFTs, PLTs and obtain VPA level, patient will be transferred to [place] inpt. unit 8A once medically optimized, REMS registry for clozapine has been done, case discussed [facility] Dr. Nam. 58 yo male with PMHx of HTN, hypothyroidism, GERD, BPH, IBS, fibromyalgia, anxiety and schizoaffective disorder presents to ED from [place] inpatient with EKG changes. Psych consulted because pt is on Clozapine and requires monitoring. Pt reports feeling nauseous for the last 2 days without episodes of emesis. Nonobstructive bowel gas pattern found on abdominal x-ray. Pt reports fair appetite and regular bowel movements. Pt is unaware that he has resided at [facility] for the past 4 months but believes he was only there for 2 days.   4/6 Med: Abnormal EKG.  Plan: CE negative x 2, unlikely ACS.  f/u TTE, No events on Tele, Continue with Asa, lisinopril, and atenolol for now, Cards recs reviewed.   4/6/18 Pt is medically stable for discharge to [facility] today as per Dr. Chester. Transfer back to [facility] today. 58 y/o male with a PMHx of HTN, hypothyroidism, GERD, BPH, fibromyalgia, anxiety and schizoaffective disorder presents to ED from [place] with nausea and EKG changes.     + EKG changes (inferior T wave inversions)- monitor on tele, pending echo  + Nausea- on Zofran and Maalox PRN  + Moderate to large amount of stool throughout the visualized colon- bowel regimen  EKG: Sinus bradycardia at 52 bpm, TWI II, III, AVF, V6, LAD, QT/QTc 420/390  CE x2 Negative  WBC: 10.55  AST: 54    4/4 CXR: Underinflated but otherwise clear lungs. No pleural effusions or pneumothorax. Cardiac and mediastinal silhouettes inaccurately assessed on the projection but appear grossly stable. Trachea midline. Spinal degenerative changes again noted.  4/4 AXR: Peripheral right hemiabdomen excluded from view. Nonobstructive bowel gas pattern. Moderate to large amount of stool throughout the visualized colon. No pneumoperitoneum. No abnormal calcifications, solid organomegaly, or gross mass densities. Spinal degenerative change. Advanced right hip osteoarthritis again noted.  4/5 Cardio c/s; As compared to EKG dated 12/17/2017, patient has developed new t-wave inversions in the inferior leads (intraventricular conduction delay is not new).  Mr. St denies symptoms of angina.  Would hold off on further ischemic evaluation given lack of symptoms.   TTE may be done as outpatient.  psych: ; Continue Clozapine 350mg PO qbedtime and Depakote 1250mg PO daily. PRN: Haldol 2 mg PO/IM/IV q6h PRN for agitation and Ativan 1mg PO/IM/IV q6h PRN for agitation. Continue to monitor WBCs, ANC, qtc as pt on Clozapine, monitor LFTs, PLTs and obtain VPA level, patient will be transferred to [place] inpt. unit 8A once medically optimized, REMS registry for clozapine has been done, case discussed [facility] Dr. Nam. 56 yo male with PMHx of HTN, hypothyroidism, GERD, BPH, IBS, fibromyalgia, anxiety and schizoaffective disorder presents to ED from [place] inpatient with EKG changes. Psych consulted because pt is on Clozapine and requires monitoring. Pt reports feeling nauseous for the last 2 days without episodes of emesis. Nonobstructive bowel gas pattern found on abdominal x-ray. Pt reports fair appetite and regular bowel movements. Pt is unaware that he has resided at [facility] for the past 4 months but believes he was only there for 2 days.   4/6 Med: Abnormal EKG.  Plan: CE negative x 2, unlikely ACS.  f/u TTE, No events on Tele, Continue with Asa, lisinopril, and atenolol for now, Cards recs reviewed.   2D Echo:   1. Normal left atrium.  LA volume index = 29 cc/m2.  2. Normal left ventricular internal dimensions and wall  thicknesses.  3. Normal left ventricular systolic function. No segmental  wall motion abnormalities.  4. Normal right ventricular size and function.    4/6/18 Pt is medically stable for discharge to [facility] today as per Dr. Chester. Transfer back to [facility] today. 58 y/o male with a PMHx of HTN, hypothyroidism, GERD, BPH, fibromyalgia, anxiety and schizoaffective disorder presents to ED from [place] with nausea and EKG changes. Pt was admitted for + EKG changes (inferior T wave inversions)- monitor on tele. TTE was done which showed no acute findings. Pt was given zofran prn nausea. Pt had BM after bowel regimen. cardiology cleared the pt. Signout was given to the provider at [facility].

## 2018-04-05 NOTE — PROGRESS NOTE ADULT - SUBJECTIVE AND OBJECTIVE BOX
Patient is a 57y old  Male who presents with a chief complaint of EKG changes (04 Apr 2018 17:17)      SUBJECTIVE / OVERNIGHT EVENTS: No acute events. Demands to see cardiologist. No CP, SOB, palpitations. Feels fine at this time.     MEDICATIONS  (STANDING):  aspirin enteric coated 81 milliGRAM(s) Oral daily  ATENolol  Tablet 50 milliGRAM(s) Oral two times a day  cholecalciferol 2000 Unit(s) Oral daily  cloZAPine 350 milliGRAM(s) Oral at bedtime  dicyclomine 10 milliGRAM(s) Oral three times a day before meals  diVALproex ER 1250 milliGRAM(s) Oral daily  docusate sodium 100 milliGRAM(s) Oral three times a day  levothyroxine 25 MICROGram(s) Oral daily  lisinopril 2.5 milliGRAM(s) Oral daily  multivitamin 1 Tablet(s) Oral daily  pantoprazole    Tablet 40 milliGRAM(s) Oral before breakfast  polyethylene glycol 3350 17 Gram(s) Oral daily  pregabalin 75 milliGRAM(s) Oral two times a day  rivaroxaban 10 milliGRAM(s) Oral daily  senna 2 Tablet(s) Oral at bedtime  tamsulosin 0.4 milliGRAM(s) Oral at bedtime    MEDICATIONS  (PRN):  acetaminophen   Tablet. 650 milliGRAM(s) Oral every 6 hours PRN Mild Pain (1 - 3)  aluminum hydroxide/magnesium hydroxide/simethicone Suspension 30 milliLiter(s) Oral every 4 hours PRN Dyspepsia  bisacodyl 5 milliGRAM(s) Oral every 12 hours PRN Constipation  ondansetron    Tablet 4 milliGRAM(s) Oral every 6 hours PRN Nausea and/or Vomiting      Vital Signs Last 24 Hrs  T(C): 36.8 (04-05-18 @ 11:05)  T(F): 98.2 (04-05-18 @ 11:05), Max: 98.2 (04-04-18 @ 18:23)  HR: 71 (04-05-18 @ 11:05) (53 - 96)  BP: 98/64 (04-05-18 @ 11:05)  BP(mean): --  RR: 16 (04-05-18 @ 11:05) (16 - 18)  SpO2: 97% (04-05-18 @ 11:05) (97% - 99%)  Wt(kg): --    04-04 @ 07:01  -  04-05 @ 07:00  --------------------------------------------------------  IN: 0 mL / OUT: 550 mL / NET: -550 mL      CAPILLARY BLOOD GLUCOSE        I&O's Summary    04 Apr 2018 07:01  -  05 Apr 2018 07:00  --------------------------------------------------------  IN: 0 mL / OUT: 550 mL / NET: -550 mL        PHYSICAL EXAM:  GENERAL: NAD, well-developed  HEAD:  Atraumatic, Normocephalic  EYES: EOMI, PERRLA, conjunctiva and sclera clear  NECK: Supple, No JVD  CHEST/LUNG: Clear to auscultation bilaterally; No wheeze  HEART: Regular rate and rhythm; No murmurs, rubs, or gallops  ABDOMEN: Soft, Nontender, Nondistended; Bowel sounds present  EXTREMITIES:  2+ Peripheral Pulses, No clubbing, cyanosis, or edema  PSYCH: AAOx3  NEUROLOGY: non-focal  SKIN: No rashes or lesions    LABS:                        14.4   7.02  )-----------( 208      ( 05 Apr 2018 06:00 )             43.4     04-05    144  |  107  |  21  ----------------------------<  89  4.5   |  24  |  1.06    Ca    8.1<L>      05 Apr 2018 06:45  Mg     2.2     04-05    TPro  7.5  /  Alb  3.3  /  TBili  0.3  /  DBili  x   /  AST  54<H>  /  ALT  17  /  AlkPhos  93  04-04      CARDIAC MARKERS ( 04 Apr 2018 20:20 )  x     / < 0.06 ng/mL / 100 u/L / 1.49 ng/mL / x      CARDIAC MARKERS ( 04 Apr 2018 13:50 )  x     / < 0.06 ng/mL / x     / x     / x              RADIOLOGY & ADDITIONAL TESTS:    Imaging Personally Reviewed:    Consultant(s) Notes Reviewed:      Care Discussed with Consultants/Other Providers:    Assessment and Plan: Patient is a 57y old  Male who presents with a chief complaint of EKG changes (04 Apr 2018 17:17)      SUBJECTIVE / OVERNIGHT EVENTS: No acute events. Demands to see cardiologist. No CP, SOB, palpitations. Feels fine at this time.     MEDICATIONS  (STANDING):  aspirin enteric coated 81 milliGRAM(s) Oral daily  ATENolol  Tablet 50 milliGRAM(s) Oral two times a day  cholecalciferol 2000 Unit(s) Oral daily  cloZAPine 350 milliGRAM(s) Oral at bedtime  dicyclomine 10 milliGRAM(s) Oral three times a day before meals  diVALproex ER 1250 milliGRAM(s) Oral daily  docusate sodium 100 milliGRAM(s) Oral three times a day  levothyroxine 25 MICROGram(s) Oral daily  lisinopril 2.5 milliGRAM(s) Oral daily  multivitamin 1 Tablet(s) Oral daily  pantoprazole    Tablet 40 milliGRAM(s) Oral before breakfast  polyethylene glycol 3350 17 Gram(s) Oral daily  pregabalin 75 milliGRAM(s) Oral two times a day  rivaroxaban 10 milliGRAM(s) Oral daily  senna 2 Tablet(s) Oral at bedtime  tamsulosin 0.4 milliGRAM(s) Oral at bedtime    MEDICATIONS  (PRN):  acetaminophen   Tablet. 650 milliGRAM(s) Oral every 6 hours PRN Mild Pain (1 - 3)  aluminum hydroxide/magnesium hydroxide/simethicone Suspension 30 milliLiter(s) Oral every 4 hours PRN Dyspepsia  bisacodyl 5 milliGRAM(s) Oral every 12 hours PRN Constipation  ondansetron    Tablet 4 milliGRAM(s) Oral every 6 hours PRN Nausea and/or Vomiting      Vital Signs Last 24 Hrs  T(C): 36.8 (04-05-18 @ 11:05)  T(F): 98.2 (04-05-18 @ 11:05), Max: 98.2 (04-04-18 @ 18:23)  HR: 71 (04-05-18 @ 11:05) (53 - 96)  BP: 98/64 (04-05-18 @ 11:05)  BP(mean): --  RR: 16 (04-05-18 @ 11:05) (16 - 18)  SpO2: 97% (04-05-18 @ 11:05) (97% - 99%)  Wt(kg): --    04-04 @ 07:01  -  04-05 @ 07:00  --------------------------------------------------------  IN: 0 mL / OUT: 550 mL / NET: -550 mL      CAPILLARY BLOOD GLUCOSE        I&O's Summary    04 Apr 2018 07:01  -  05 Apr 2018 07:00  --------------------------------------------------------  IN: 0 mL / OUT: 550 mL / NET: -550 mL        PHYSICAL EXAM:  GENERAL: NAD, well-developed, lying in bed, unkept  HEAD:  Atraumatic, Normocephalic  EYES: conjunctiva and sclera clear  NECK: Supple, No JVD, no thyromegaly  CHEST/LUNG: Clear to auscultation bilaterally; No wheeze  HEART: Regular rate and rhythm; No murmurs,  ABDOMEN: Soft, Nontender, Nondistended; Bowel sounds present  EXTREMITIES:  2+ Peripheral Pulses, No clubbing, cyanosis, or edema  PSYCH: AAOx3, calm  NEUROLOGY: non-focal    LABS:                        14.4   7.02  )-----------( 208      ( 05 Apr 2018 06:00 )             43.4     04-05    144  |  107  |  21  ----------------------------<  89  4.5   |  24  |  1.06    Ca    8.1<L>      05 Apr 2018 06:45  Mg     2.2     04-05    TPro  7.5  /  Alb  3.3  /  TBili  0.3  /  DBili  x   /  AST  54<H>  /  ALT  17  /  AlkPhos  93  04-04    Thyroid Stimulating Hormone, Serum: 4.91 uIU/mL (04.05.18 @ 06:45)        CARDIAC MARKERS ( 04 Apr 2018 20:20 )  x     / < 0.06 ng/mL / 100 u/L / 1.49 ng/mL / x      CARDIAC MARKERS ( 04 Apr 2018 13:50 )  x     / < 0.06 ng/mL / x     / x     / x        RADIOLOGY & ADDITIONAL TESTS:    Imaging Personally Reviewed:  < from: Xray Chest 2 Views PA/Lat (04.04.18 @ 15:20) >    IMPRESSION:  Underinflated but otherwise clear lungs. No pleural effusions or   pneumothorax.     Cardiac and mediastinal silhouettes inaccurately assessed on the   projection but appear grossly stable.    Trachea midline.    Spinal degenerative changes again noted.    < end of copied text >      Consultant(s) Notes Reviewed:      Care Discussed with Consultants/Other Providers: Cards (Dr. Cheung) re: need for further cardiac w/u    Assessment and Plan: Patient is a 57y old  Male who presents with a chief complaint of EKG changes (04 Apr 2018 17:17)      SUBJECTIVE / OVERNIGHT EVENTS: No acute events. Demands to see cardiologist. No CP, SOB, palpitations. Feels fine at this time.     MEDICATIONS  (STANDING):  aspirin enteric coated 81 milliGRAM(s) Oral daily  ATENolol  Tablet 50 milliGRAM(s) Oral two times a day  cholecalciferol 2000 Unit(s) Oral daily  cloZAPine 350 milliGRAM(s) Oral at bedtime  dicyclomine 10 milliGRAM(s) Oral three times a day before meals  diVALproex ER 1250 milliGRAM(s) Oral daily  docusate sodium 100 milliGRAM(s) Oral three times a day  levothyroxine 25 MICROGram(s) Oral daily  lisinopril 2.5 milliGRAM(s) Oral daily  multivitamin 1 Tablet(s) Oral daily  pantoprazole    Tablet 40 milliGRAM(s) Oral before breakfast  polyethylene glycol 3350 17 Gram(s) Oral daily  pregabalin 75 milliGRAM(s) Oral two times a day  rivaroxaban 10 milliGRAM(s) Oral daily  senna 2 Tablet(s) Oral at bedtime  tamsulosin 0.4 milliGRAM(s) Oral at bedtime    MEDICATIONS  (PRN):  acetaminophen   Tablet. 650 milliGRAM(s) Oral every 6 hours PRN Mild Pain (1 - 3)  aluminum hydroxide/magnesium hydroxide/simethicone Suspension 30 milliLiter(s) Oral every 4 hours PRN Dyspepsia  bisacodyl 5 milliGRAM(s) Oral every 12 hours PRN Constipation  ondansetron    Tablet 4 milliGRAM(s) Oral every 6 hours PRN Nausea and/or Vomiting      Vital Signs Last 24 Hrs  T(C): 36.8 (04-05-18 @ 11:05)  T(F): 98.2 (04-05-18 @ 11:05), Max: 98.2 (04-04-18 @ 18:23)  HR: 71 (04-05-18 @ 11:05) (53 - 96)  BP: 98/64 (04-05-18 @ 11:05)  BP(mean): --  RR: 16 (04-05-18 @ 11:05) (16 - 18)  SpO2: 97% (04-05-18 @ 11:05) (97% - 99%)  Wt(kg): --    04-04 @ 07:01  -  04-05 @ 07:00  --------------------------------------------------------  IN: 0 mL / OUT: 550 mL / NET: -550 mL      CAPILLARY BLOOD GLUCOSE        I&O's Summary    04 Apr 2018 07:01  -  05 Apr 2018 07:00  --------------------------------------------------------  IN: 0 mL / OUT: 550 mL / NET: -550 mL        PHYSICAL EXAM:  GENERAL: NAD, well-developed, lying in bed, unkept  HEAD:  Atraumatic, Normocephalic  EYES: conjunctiva and sclera clear  NECK: Supple, No JVD, no thyromegaly  CHEST/LUNG: Clear to auscultation bilaterally; No wheeze  HEART: Regular rate and rhythm; No murmurs,  ABDOMEN: Soft, Nontender, Nondistended; Bowel sounds present  EXTREMITIES:  2+ Peripheral Pulses, No clubbing, cyanosis, or edema  PSYCH: AAOx3, calm  NEUROLOGY: non-focal    LABS:                        14.4   7.02  )-----------( 208      ( 05 Apr 2018 06:00 )             43.4     04-05    144  |  107  |  21  ----------------------------<  89  4.5   |  24  |  1.06    Ca    8.1<L>      05 Apr 2018 06:45  Mg     2.2     04-05    TPro  7.5  /  Alb  3.3  /  TBili  0.3  /  DBili  x   /  AST  54<H>  /  ALT  17  /  AlkPhos  93  04-04    Thyroid Stimulating Hormone, Serum: 4.91 uIU/mL (04.05.18 @ 06:45)    Free Thyroxine, Serum: 1.16 ng/dL (04.05.18 @ 06:45)        CARDIAC MARKERS ( 04 Apr 2018 20:20 )  x     / < 0.06 ng/mL / 100 u/L / 1.49 ng/mL / x      CARDIAC MARKERS ( 04 Apr 2018 13:50 )  x     / < 0.06 ng/mL / x     / x     / x        RADIOLOGY & ADDITIONAL TESTS:    Imaging Personally Reviewed:  < from: Xray Chest 2 Views PA/Lat (04.04.18 @ 15:20) >    IMPRESSION:  Underinflated but otherwise clear lungs. No pleural effusions or   pneumothorax.     Cardiac and mediastinal silhouettes inaccurately assessed on the   projection but appear grossly stable.    Trachea midline.    Spinal degenerative changes again noted.    < end of copied text >      Consultant(s) Notes Reviewed:      Care Discussed with Consultants/Other Providers: Cards (Dr. Cheung) re: need for further cardiac w/u    Assessment and Plan:

## 2018-04-05 NOTE — BEHAVIORAL HEALTH ASSESSMENT NOTE - SUMMARY
58 yo male with PMHx of HTN, hypothyroidism, GERD, BPH, IBS, fibromyalgia, anxiety and schizoaffective disorder presents to ED from [place] inpatient with EKG changes. Psych consulted because pt is on Clozapine and requires monitoring. Pt reports feeling nauseous for the last 2 days without episodes of emesis. Nonobstructive bowel gas pattern found on abdominal x-ray. Pt reports fair appetite and regular bowel movements. Pt is unaware that he has resided at [facility] for the past 4 months but believes he was only there for 2 days.     On exam, he was AAOx3. Pt became uncooperative during the interview and asked to stop the questioning. Pt was limited with his answers but his thoughts were linear and his speech organized. Maintained eye contact. Revealed slight confusion regarding his length of stay at [facility] stating only 2 days when it has been 4 months. He was able to vocalize understanding of condition and reason for hospitalization. Denies SI/HI/AH/VH. For psychosis, c/w Clozapine 350mg PO qbedtime and Depakote 1250mg PO daily. PRN: Haldol 2.5mg PO/IM/IV q6h PRN for agitation and Ativan 1mg PO/IM/IV q6h PRN for agitation. Will follow. 56 yo male with PMHx of HTN, hypothyroidism, GERD, BPH, IBS, fibromyalgia, anxiety and schizoaffective disorder presents to ED from [facility] inpatient with EKG changes. Psych consulted because pt is on Clozapine and requires monitoring. Pt reports feeling nauseous for the last 2 days without episodes of emesis. Nonobstructive bowel gas pattern found on abdominal x-ray. Pt reports fair appetite and regular bowel movements. Pt is unaware that he has resided at [facility] for the past 4 months but believes he was only there for 2 days.     On exam, he was calm, but became irritable during the interview and asked to stop the questioning. Pt was limited with his answers but his thoughts were overall  linear and his speech organized. He was able to verbalize understanding of reason for hospitalization. Denies SI/HI/AH/VH.   Plan; Continue Clozapine 350mg PO qbedtime and Depakote 1250mg PO daily.   PRN: Haldol 2 mg PO/IM/IV q6h PRN for agitation and Ativan 1mg PO/IM/IV q6h PRN for agitation. Continue to monitor WBCs, ANC, qtc as pt on Clozapine, monitor LFTs, PLTs and obtain VPA level, patient will be transferred to [facility] inpt. unit 8A once medically optimized, [facility] registry for clozapine has been done, case discussed [facility] Dr. Nam

## 2018-04-05 NOTE — BEHAVIORAL HEALTH ASSESSMENT NOTE - OTHER
Became uncooperative unable to assess, lying in bed [facility] Service Unit 8A Became uncooperative at times linear but concrete poverty of content limited

## 2018-04-05 NOTE — BEHAVIORAL HEALTH ASSESSMENT NOTE - NSBHCONSULTMEDAGITATION_PSY_A_CORE FT
Haldol 2.5mg PO/IM/IV q6h PRN for agitation  Ativan 1mg PO/IM/IV q6h PRN for agitation Haldol 2mg PO/IM/IV q6h PRN for agitation  Ativan 1mg PO/IM/IV q6h PRN for agitation

## 2018-04-05 NOTE — DISCHARGE NOTE ADULT - MEDICATION SUMMARY - MEDICATIONS TO TAKE
I will START or STAY ON the medications listed below when I get home from the hospital:    acetaminophen 325 mg oral tablet  -- 3 tab(s) by mouth 3 times a day, As Needed  -- Indication: For Pain    aspirin 81 mg oral delayed release tablet  -- 1 tab(s) by mouth once a day  -- Indication: For Heart     lisinopril 2.5 mg oral tablet  -- 1 tab(s) by mouth once a day  -- Indication: For HTN (hypertension)    tamsulosin 0.4 mg oral capsule  -- 1 cap(s) by mouth once a day (at bedtime)  -- Indication: For BPH (benign prostatic hyperplasia)    Xarelto 10 mg oral tablet  -- 1 tab(s) by mouth once a day  -- Indication: For DVT ppx     divalproex sodium 250 mg oral tablet, extended release  -- 5 tab(s) by mouth once a day  -- Indication: For Anticonvulsant    Lyrica 75 mg oral capsule  -- 1 cap(s) by mouth 2 times a day  -- Indication: For CNS agent    cloZAPine 50 mg oral tablet  -- 7 tab(s) by mouth once a day (at bedtime)  -- Indication: For Antipsychotic    atenolol 50 mg oral tablet  -- 1 tab(s) by mouth 2 times a day  -- Indication: For HTN (hypertension)    alendronate 70 mg oral tablet  -- 1 tab(s) by mouth once a week on Wednesdays  -- Indication: For metabolic agent    dicyclomine 10 mg oral capsule  -- 1 cap(s) by mouth 3 times a day  -- Indication: For GI agent    bisacodyl 5 mg oral delayed release tablet  -- 2 tab(s) by mouth once a day  -- Indication: For laxative     omeprazole 20 mg oral delayed release capsule  -- 1 cap(s) by mouth once a day  -- Indication: For GERD (gastroesophageal reflux disease)    levothyroxine 25 mcg (0.025 mg) oral tablet  -- 1 tab(s) by mouth once a day  -- Indication: For Hypothyroidism    Multiple Vitamins with Minerals oral tablet  -- 1 tab(s) by mouth once a day  -- Indication: For Vitamin    Vitamin D3 1000 intl units oral tablet  -- 1 tab(s) by mouth 2 times a day  -- Indication: For Vitamin

## 2018-04-05 NOTE — BEHAVIORAL HEALTH ASSESSMENT NOTE - RISK ASSESSMENT
Pt expressed no SI/HI. Reports talking to his family and fiance regularly which provides protective factors. Pt expressed no SI/HI. Reports talking to his family and fiance regularly which provides protective factors.  [facility]

## 2018-04-06 VITALS
SYSTOLIC BLOOD PRESSURE: 100 MMHG | TEMPERATURE: 98 F | DIASTOLIC BLOOD PRESSURE: 52 MMHG | HEART RATE: 54 BPM | RESPIRATION RATE: 18 BRPM | OXYGEN SATURATION: 100 %

## 2018-04-06 LAB
BUN SERPL-MCNC: 22 MG/DL — SIGNIFICANT CHANGE UP (ref 7–23)
CALCIUM SERPL-MCNC: 8.7 MG/DL — SIGNIFICANT CHANGE UP (ref 8.4–10.5)
CHLORIDE SERPL-SCNC: 106 MMOL/L — SIGNIFICANT CHANGE UP (ref 98–107)
CO2 SERPL-SCNC: 24 MMOL/L — SIGNIFICANT CHANGE UP (ref 22–31)
CREAT SERPL-MCNC: 1.14 MG/DL — SIGNIFICANT CHANGE UP (ref 0.5–1.3)
GLUCOSE SERPL-MCNC: 91 MG/DL — SIGNIFICANT CHANGE UP (ref 70–99)
HCT VFR BLD CALC: 44.4 % — SIGNIFICANT CHANGE UP (ref 39–50)
HGB BLD-MCNC: 14.6 G/DL — SIGNIFICANT CHANGE UP (ref 13–17)
MAGNESIUM SERPL-MCNC: 2.2 MG/DL — SIGNIFICANT CHANGE UP (ref 1.6–2.6)
MCHC RBC-ENTMCNC: 29.9 PG — SIGNIFICANT CHANGE UP (ref 27–34)
MCHC RBC-ENTMCNC: 32.9 % — SIGNIFICANT CHANGE UP (ref 32–36)
MCV RBC AUTO: 90.8 FL — SIGNIFICANT CHANGE UP (ref 80–100)
NRBC # FLD: 0 — SIGNIFICANT CHANGE UP
PLATELET # BLD AUTO: 212 K/UL — SIGNIFICANT CHANGE UP (ref 150–400)
PMV BLD: 10.4 FL — SIGNIFICANT CHANGE UP (ref 7–13)
POTASSIUM SERPL-MCNC: 4.4 MMOL/L — SIGNIFICANT CHANGE UP (ref 3.5–5.3)
POTASSIUM SERPL-SCNC: 4.4 MMOL/L — SIGNIFICANT CHANGE UP (ref 3.5–5.3)
RBC # BLD: 4.89 M/UL — SIGNIFICANT CHANGE UP (ref 4.2–5.8)
RBC # FLD: 14.4 % — SIGNIFICANT CHANGE UP (ref 10.3–14.5)
SODIUM SERPL-SCNC: 141 MMOL/L — SIGNIFICANT CHANGE UP (ref 135–145)
VALPROATE SERPL-MCNC: 49.5 UG/ML — LOW (ref 50–100)
WBC # BLD: 6.94 K/UL — SIGNIFICANT CHANGE UP (ref 3.8–10.5)
WBC # FLD AUTO: 6.94 K/UL — SIGNIFICANT CHANGE UP (ref 3.8–10.5)

## 2018-04-06 PROCEDURE — 93306 TTE W/DOPPLER COMPLETE: CPT | Mod: 26

## 2018-04-06 PROCEDURE — 99239 HOSP IP/OBS DSCHRG MGMT >30: CPT

## 2018-04-06 RX ORDER — POLYETHYLENE GLYCOL 3350 17 G/17G
0 POWDER, FOR SOLUTION ORAL
Qty: 0 | Refills: 0 | COMMUNITY

## 2018-04-06 RX ORDER — SENNA PLUS 8.6 MG/1
2 TABLET ORAL
Qty: 0 | Refills: 0 | COMMUNITY

## 2018-04-06 RX ADMIN — Medication 10 MILLIGRAM(S): at 11:03

## 2018-04-06 RX ADMIN — PANTOPRAZOLE SODIUM 40 MILLIGRAM(S): 20 TABLET, DELAYED RELEASE ORAL at 06:03

## 2018-04-06 RX ADMIN — Medication 25 MICROGRAM(S): at 05:33

## 2018-04-06 RX ADMIN — Medication 81 MILLIGRAM(S): at 11:04

## 2018-04-06 RX ADMIN — Medication 75 MILLIGRAM(S): at 05:33

## 2018-04-06 RX ADMIN — Medication 10 MILLIGRAM(S): at 06:03

## 2018-04-06 RX ADMIN — Medication 2000 UNIT(S): at 11:02

## 2018-04-06 RX ADMIN — ONDANSETRON 4 MILLIGRAM(S): 8 TABLET, FILM COATED ORAL at 15:15

## 2018-04-06 RX ADMIN — RIVAROXABAN 10 MILLIGRAM(S): KIT at 11:04

## 2018-04-06 RX ADMIN — DIVALPROEX SODIUM 1250 MILLIGRAM(S): 500 TABLET, DELAYED RELEASE ORAL at 11:04

## 2018-04-06 RX ADMIN — Medication 1 TABLET(S): at 11:03

## 2018-04-06 RX ADMIN — LISINOPRIL 2.5 MILLIGRAM(S): 2.5 TABLET ORAL at 05:33

## 2018-04-06 RX ADMIN — ATENOLOL 50 MILLIGRAM(S): 25 TABLET ORAL at 05:33

## 2018-04-06 NOTE — PROGRESS NOTE ADULT - SUBJECTIVE AND OBJECTIVE BOX
Patient is a 57y old  Male who presents with a chief complaint of EKG changes (05 Apr 2018 15:00)      SUBJECTIVE / OVERNIGHT EVENTS: denies CP or SOB    MEDICATIONS  (STANDING):  aspirin enteric coated 81 milliGRAM(s) Oral daily  ATENolol  Tablet 50 milliGRAM(s) Oral two times a day  cholecalciferol 2000 Unit(s) Oral daily  cloZAPine 350 milliGRAM(s) Oral at bedtime  dicyclomine 10 milliGRAM(s) Oral three times a day before meals  diVALproex ER 1250 milliGRAM(s) Oral daily  docusate sodium 100 milliGRAM(s) Oral three times a day  levothyroxine 25 MICROGram(s) Oral daily  lisinopril 2.5 milliGRAM(s) Oral daily  multivitamin 1 Tablet(s) Oral daily  pantoprazole    Tablet 40 milliGRAM(s) Oral before breakfast  polyethylene glycol 3350 17 Gram(s) Oral daily  pregabalin 75 milliGRAM(s) Oral two times a day  rivaroxaban 10 milliGRAM(s) Oral daily  senna 2 Tablet(s) Oral at bedtime  tamsulosin 0.4 milliGRAM(s) Oral at bedtime    MEDICATIONS  (PRN):  acetaminophen   Tablet. 650 milliGRAM(s) Oral every 6 hours PRN Mild Pain (1 - 3)  aluminum hydroxide/magnesium hydroxide/simethicone Suspension 30 milliLiter(s) Oral every 4 hours PRN Dyspepsia  bisacodyl 5 milliGRAM(s) Oral every 12 hours PRN Constipation  ondansetron    Tablet 4 milliGRAM(s) Oral every 6 hours PRN Nausea and/or Vomiting      T(C): 36.7 (04-06-18 @ 10:26), Max: 37.1 (04-05-18 @ 20:17)  HR: 54 (04-06-18 @ 10:26) (51 - 63)  BP: 100/52 (04-06-18 @ 10:26) (96/64 - 146/89)  RR: 18 (04-06-18 @ 10:26) (16 - 18)  SpO2: 100% (04-06-18 @ 10:26) (99% - 100%)  CAPILLARY BLOOD GLUCOSE        I&O's Summary    05 Apr 2018 07:01  -  06 Apr 2018 07:00  --------------------------------------------------------  IN: 0 mL / OUT: 780 mL / NET: -780 mL        PHYSICAL EXAM:  GENERAL: NAD,   HEAD:  Normocephalic  EYES: EOMI,  conjunctiva and sclera clear  NECK: Supple,   CHEST/LUNG: Clear to auscultation bilaterally; No wheeze  HEART:  s1 s2 + Regular rate and rhythm;   ABDOMEN: Soft, Nontender, Nondistended; Bowel sounds present  EXTREMITIES:   No clubbing, cyanosis  NEURO: AAOx3      LABS:                        14.6   6.94  )-----------( 212      ( 06 Apr 2018 06:22 )             44.4     04-06    141  |  106  |  22  ----------------------------<  91  4.4   |  24  |  1.14    Ca    8.7      06 Apr 2018 06:22  Mg     2.2     04-06    TPro  7.5  /  Alb  3.3  /  TBili  0.3  /  DBili  x   /  AST  54<H>  /  ALT  17  /  AlkPhos  93  04-04      CARDIAC MARKERS ( 04 Apr 2018 20:20 )  x     / < 0.06 ng/mL / 100 u/L / 1.49 ng/mL / x      CARDIAC MARKERS ( 04 Apr 2018 13:50 )  x     / < 0.06 ng/mL / x     / x     / x              Consultant(s) Notes Reviewed:  Cardiology

## 2018-04-06 NOTE — PROGRESS NOTE ADULT - PROBLEM SELECTOR PLAN 2
c/w antipsychotics per PSych recs
-As patient is on clozapine, psych c/s placed for assistance with any further needed titrations  -c/w depakote

## 2018-04-06 NOTE — PROGRESS NOTE ADULT - PROBLEM SELECTOR PLAN 4
-TSH elevated but FT4 unremarkable, can f/u outpatient for further titration
-TSH elevated but FT4 unremarkable, can f/u outpatient for further titration

## 2018-04-06 NOTE — PROGRESS NOTE ADULT - PROBLEM SELECTOR PLAN 1
-CE negative x 2, unlikely ACS  -f/u TTE  -No events on Tele  -Continue with Asa, lisinopril, and atenolol for now  -Cards recs reviewed -CE negative x 2, unlikely ACS  -TTe shows no acute findings  -No events on Tele  -Continue with Asa, lisinopril, and atenolol for now  -Cards recs reviewed

## 2018-04-06 NOTE — PROGRESS NOTE ADULT - ASSESSMENT
56 yo M with schizophrenia, ?outpatient [place] resident, HTN p/w nausea and vomiting in the setting of new inferior lead T-wave inversions. CE neg x2.
56 yo M with schizophrenia, ?outpatient [facility] resident, HTN p/w nausea and vomiting in the setting of new inferior lead T-wave inversions. CE neg x2.

## 2018-04-06 NOTE — PROGRESS NOTE ADULT - PROBLEM SELECTOR PLAN 8
-Patient was placed on Xarelto 10mg daily for DVT ppx as confirmed by med hx pharmacist, will c/w this for now
-Patient was placed on Xarelto 10mg daily for DVT ppx as confirmed by med hx pharmacist, will c/w this for now

## 2018-08-10 ENCOUNTER — INPATIENT (INPATIENT)
Facility: HOSPITAL | Age: 58
LOS: 3 days | Discharge: PSYCHIATRIC OR SUBSTANCE USE FACILITY | End: 2018-08-14
Attending: HOSPITALIST | Admitting: HOSPITALIST
Payer: MEDICAID

## 2018-08-10 VITALS
TEMPERATURE: 98 F | DIASTOLIC BLOOD PRESSURE: 80 MMHG | OXYGEN SATURATION: 99 % | HEART RATE: 92 BPM | RESPIRATION RATE: 18 BRPM | SYSTOLIC BLOOD PRESSURE: 136 MMHG

## 2018-08-10 PROBLEM — K21.9 GASTRO-ESOPHAGEAL REFLUX DISEASE WITHOUT ESOPHAGITIS: Chronic | Status: ACTIVE | Noted: 2018-04-04

## 2018-08-10 PROBLEM — F20.9 SCHIZOPHRENIA, UNSPECIFIED: Chronic | Status: ACTIVE | Noted: 2018-04-04

## 2018-08-10 PROBLEM — I10 ESSENTIAL (PRIMARY) HYPERTENSION: Chronic | Status: ACTIVE | Noted: 2018-04-04

## 2018-08-10 PROBLEM — F41.9 ANXIETY DISORDER, UNSPECIFIED: Chronic | Status: ACTIVE | Noted: 2018-04-04

## 2018-08-10 PROBLEM — E03.9 HYPOTHYROIDISM, UNSPECIFIED: Chronic | Status: ACTIVE | Noted: 2018-04-04

## 2018-08-10 PROBLEM — K58.9 IRRITABLE BOWEL SYNDROME WITHOUT DIARRHEA: Chronic | Status: ACTIVE | Noted: 2018-04-04

## 2018-08-10 PROBLEM — N40.0 BENIGN PROSTATIC HYPERPLASIA WITHOUT LOWER URINARY TRACT SYMPTOMS: Chronic | Status: ACTIVE | Noted: 2018-04-04

## 2018-08-10 LAB
ALBUMIN SERPL ELPH-MCNC: 4.3 G/DL — SIGNIFICANT CHANGE UP (ref 3.3–5)
ALP SERPL-CCNC: 117 U/L — SIGNIFICANT CHANGE UP (ref 40–120)
ALT FLD-CCNC: 8 U/L — SIGNIFICANT CHANGE UP (ref 4–41)
ANISOCYTOSIS BLD QL: SLIGHT — SIGNIFICANT CHANGE UP
APAP SERPL-MCNC: < 15 UG/ML — LOW (ref 15–25)
AST SERPL-CCNC: 17 U/L — SIGNIFICANT CHANGE UP (ref 4–40)
BASE EXCESS BLDV CALC-SCNC: -1.3 MMOL/L — SIGNIFICANT CHANGE UP
BASE EXCESS BLDV CALC-SCNC: 2.8 MMOL/L — SIGNIFICANT CHANGE UP
BASOPHILS # BLD AUTO: 0.04 K/UL — SIGNIFICANT CHANGE UP (ref 0–0.2)
BASOPHILS NFR BLD AUTO: 0.3 % — SIGNIFICANT CHANGE UP (ref 0–2)
BASOPHILS NFR SPEC: 0 % — SIGNIFICANT CHANGE UP (ref 0–2)
BILIRUB SERPL-MCNC: 1 MG/DL — SIGNIFICANT CHANGE UP (ref 0.2–1.2)
BLASTS # FLD: 0 % — SIGNIFICANT CHANGE UP (ref 0–0)
BLOOD GAS VENOUS - CREATININE: 1.44 MG/DL — HIGH (ref 0.5–1.3)
BLOOD GAS VENOUS - CREATININE: 1.47 MG/DL — HIGH (ref 0.5–1.3)
BUN SERPL-MCNC: 32 MG/DL — HIGH (ref 7–23)
BUN SERPL-MCNC: 34 MG/DL — HIGH (ref 7–23)
CALCIUM SERPL-MCNC: 7.6 MG/DL — LOW (ref 8.4–10.5)
CALCIUM SERPL-MCNC: 9.3 MG/DL — SIGNIFICANT CHANGE UP (ref 8.4–10.5)
CHLORIDE BLDV-SCNC: 100 MMOL/L — SIGNIFICANT CHANGE UP (ref 96–108)
CHLORIDE BLDV-SCNC: 95 MMOL/L — LOW (ref 96–108)
CHLORIDE SERPL-SCNC: 88 MMOL/L — LOW (ref 98–107)
CHLORIDE SERPL-SCNC: 94 MMOL/L — LOW (ref 98–107)
CK SERPL-CCNC: 89 U/L — SIGNIFICANT CHANGE UP (ref 30–200)
CO2 SERPL-SCNC: 19 MMOL/L — LOW (ref 22–31)
CO2 SERPL-SCNC: 20 MMOL/L — LOW (ref 22–31)
CREAT SERPL-MCNC: 1.43 MG/DL — HIGH (ref 0.5–1.3)
CREAT SERPL-MCNC: 1.44 MG/DL — HIGH (ref 0.5–1.3)
EOSINOPHIL # BLD AUTO: 0.16 K/UL — SIGNIFICANT CHANGE UP (ref 0–0.5)
EOSINOPHIL NFR BLD AUTO: 1.4 % — SIGNIFICANT CHANGE UP (ref 0–6)
EOSINOPHIL NFR FLD: 1.8 % — SIGNIFICANT CHANGE UP (ref 0–6)
ETHANOL BLD-MCNC: < 10 MG/DL — SIGNIFICANT CHANGE UP
GAS PNL BLDV: 124 MMOL/L — LOW (ref 136–146)
GAS PNL BLDV: 128 MMOL/L — LOW (ref 136–146)
GIANT PLATELETS BLD QL SMEAR: PRESENT — SIGNIFICANT CHANGE UP
GLUCOSE BLDV-MCNC: 121 — HIGH (ref 70–99)
GLUCOSE BLDV-MCNC: 136 — HIGH (ref 70–99)
GLUCOSE SERPL-MCNC: 111 MG/DL — HIGH (ref 70–99)
GLUCOSE SERPL-MCNC: 121 MG/DL — HIGH (ref 70–99)
HCO3 BLDV-SCNC: 24 MMOL/L — SIGNIFICANT CHANGE UP (ref 20–27)
HCO3 BLDV-SCNC: 24 MMOL/L — SIGNIFICANT CHANGE UP (ref 20–27)
HCT VFR BLD CALC: 45.5 % — SIGNIFICANT CHANGE UP (ref 39–50)
HCT VFR BLD CALC: 55.1 % — HIGH (ref 39–50)
HCT VFR BLDV CALC: 52.4 % — HIGH (ref 39–51)
HCT VFR BLDV CALC: 61.7 % — CRITICAL HIGH (ref 39–51)
HGB BLD-MCNC: 16.2 G/DL — SIGNIFICANT CHANGE UP (ref 13–17)
HGB BLD-MCNC: 19.5 G/DL — CRITICAL HIGH (ref 13–17)
HGB BLDV-MCNC: 17.1 G/DL — HIGH (ref 13–17)
HGB BLDV-MCNC: 20.2 G/DL — CRITICAL HIGH (ref 13–17)
IMM GRANULOCYTES # BLD AUTO: 0.03 # — SIGNIFICANT CHANGE UP
IMM GRANULOCYTES NFR BLD AUTO: 0.3 % — SIGNIFICANT CHANGE UP (ref 0–1.5)
LACTATE BLDV-MCNC: 1.5 MMOL/L — SIGNIFICANT CHANGE UP (ref 0.5–2)
LACTATE BLDV-MCNC: 2.7 MMOL/L — HIGH (ref 0.5–2)
LYMPHOCYTES # BLD AUTO: 2.76 K/UL — SIGNIFICANT CHANGE UP (ref 1–3.3)
LYMPHOCYTES # BLD AUTO: 23.5 % — SIGNIFICANT CHANGE UP (ref 13–44)
LYMPHOCYTES NFR SPEC AUTO: 16.8 % — SIGNIFICANT CHANGE UP (ref 13–44)
MCHC RBC-ENTMCNC: 29.6 PG — SIGNIFICANT CHANGE UP (ref 27–34)
MCHC RBC-ENTMCNC: 30.1 PG — SIGNIFICANT CHANGE UP (ref 27–34)
MCHC RBC-ENTMCNC: 35.4 % — SIGNIFICANT CHANGE UP (ref 32–36)
MCHC RBC-ENTMCNC: 35.6 % — SIGNIFICANT CHANGE UP (ref 32–36)
MCV RBC AUTO: 83.7 FL — SIGNIFICANT CHANGE UP (ref 80–100)
MCV RBC AUTO: 84.6 FL — SIGNIFICANT CHANGE UP (ref 80–100)
METAMYELOCYTES # FLD: 0 % — SIGNIFICANT CHANGE UP (ref 0–1)
MONOCYTES # BLD AUTO: 1.2 K/UL — HIGH (ref 0–0.9)
MONOCYTES NFR BLD AUTO: 10.2 % — SIGNIFICANT CHANGE UP (ref 2–14)
MONOCYTES NFR BLD: 12.4 % — HIGH (ref 2–9)
MYELOCYTES NFR BLD: 0 % — SIGNIFICANT CHANGE UP (ref 0–0)
NEUTROPHIL AB SER-ACNC: 61.9 % — SIGNIFICANT CHANGE UP (ref 43–77)
NEUTROPHILS # BLD AUTO: 7.53 K/UL — HIGH (ref 1.8–7.4)
NEUTROPHILS NFR BLD AUTO: 64.3 % — SIGNIFICANT CHANGE UP (ref 43–77)
NEUTS BAND # BLD: 0 % — SIGNIFICANT CHANGE UP (ref 0–6)
NRBC # FLD: 0 — SIGNIFICANT CHANGE UP
NRBC # FLD: 0 — SIGNIFICANT CHANGE UP
OTHER - HEMATOLOGY %: 0 — SIGNIFICANT CHANGE UP
PCO2 BLDV: 33 MMHG — LOW (ref 41–51)
PCO2 BLDV: 49 MMHG — SIGNIFICANT CHANGE UP (ref 41–51)
PH BLDV: 7.37 PH — SIGNIFICANT CHANGE UP (ref 7.32–7.43)
PH BLDV: 7.45 PH — HIGH (ref 7.32–7.43)
PLATELET # BLD AUTO: 235 K/UL — SIGNIFICANT CHANGE UP (ref 150–400)
PLATELET # BLD AUTO: 345 K/UL — SIGNIFICANT CHANGE UP (ref 150–400)
PLATELET COUNT - ESTIMATE: NORMAL — SIGNIFICANT CHANGE UP
PMV BLD: 10.2 FL — SIGNIFICANT CHANGE UP (ref 7–13)
PMV BLD: 9.9 FL — SIGNIFICANT CHANGE UP (ref 7–13)
PO2 BLDV: 60 MMHG — HIGH (ref 35–40)
PO2 BLDV: < 24 MMHG — LOW (ref 35–40)
POTASSIUM BLDV-SCNC: 2.9 MMOL/L — CRITICAL LOW (ref 3.4–4.5)
POTASSIUM BLDV-SCNC: 3.4 MMOL/L — SIGNIFICANT CHANGE UP (ref 3.4–4.5)
POTASSIUM SERPL-MCNC: 3.1 MMOL/L — LOW (ref 3.5–5.3)
POTASSIUM SERPL-MCNC: 3.2 MMOL/L — LOW (ref 3.5–5.3)
POTASSIUM SERPL-SCNC: 3.1 MMOL/L — LOW (ref 3.5–5.3)
POTASSIUM SERPL-SCNC: 3.2 MMOL/L — LOW (ref 3.5–5.3)
PROMYELOCYTES # FLD: 0 % — SIGNIFICANT CHANGE UP (ref 0–0)
PROT SERPL-MCNC: 9.1 G/DL — HIGH (ref 6–8.3)
RBC # BLD: 5.38 M/UL — SIGNIFICANT CHANGE UP (ref 4.2–5.8)
RBC # BLD: 6.58 M/UL — HIGH (ref 4.2–5.8)
RBC # FLD: 12.6 % — SIGNIFICANT CHANGE UP (ref 10.3–14.5)
RBC # FLD: 12.8 % — SIGNIFICANT CHANGE UP (ref 10.3–14.5)
SALICYLATES SERPL-MCNC: < 5 MG/DL — LOW (ref 15–30)
SAO2 % BLDV: 14.1 % — LOW (ref 60–85)
SAO2 % BLDV: 91.1 % — HIGH (ref 60–85)
SODIUM SERPL-SCNC: 127 MMOL/L — LOW (ref 135–145)
SODIUM SERPL-SCNC: 130 MMOL/L — LOW (ref 135–145)
TSH SERPL-MCNC: 6.43 UIU/ML — HIGH (ref 0.27–4.2)
VARIANT LYMPHS # BLD: 7.1 % — SIGNIFICANT CHANGE UP
WBC # BLD: 11.72 K/UL — HIGH (ref 3.8–10.5)
WBC # BLD: 9.67 K/UL — SIGNIFICANT CHANGE UP (ref 3.8–10.5)
WBC # FLD AUTO: 11.72 K/UL — HIGH (ref 3.8–10.5)
WBC # FLD AUTO: 9.67 K/UL — SIGNIFICANT CHANGE UP (ref 3.8–10.5)

## 2018-08-10 PROCEDURE — 90792 PSYCH DIAG EVAL W/MED SRVCS: CPT

## 2018-08-10 RX ORDER — POTASSIUM CHLORIDE 20 MEQ
10 PACKET (EA) ORAL ONCE
Qty: 0 | Refills: 0 | Status: COMPLETED | OUTPATIENT
Start: 2018-08-10 | End: 2018-08-10

## 2018-08-10 RX ORDER — SODIUM CHLORIDE 9 MG/ML
2000 INJECTION INTRAMUSCULAR; INTRAVENOUS; SUBCUTANEOUS ONCE
Qty: 0 | Refills: 0 | Status: COMPLETED | OUTPATIENT
Start: 2018-08-10 | End: 2018-08-10

## 2018-08-10 RX ORDER — POTASSIUM CHLORIDE 20 MEQ
40 PACKET (EA) ORAL ONCE
Qty: 0 | Refills: 0 | Status: COMPLETED | OUTPATIENT
Start: 2018-08-10 | End: 2018-08-10

## 2018-08-10 RX ADMIN — SODIUM CHLORIDE 1000 MILLILITER(S): 9 INJECTION INTRAMUSCULAR; INTRAVENOUS; SUBCUTANEOUS at 17:47

## 2018-08-10 RX ADMIN — SODIUM CHLORIDE 2000 MILLILITER(S): 9 INJECTION INTRAMUSCULAR; INTRAVENOUS; SUBCUTANEOUS at 18:29

## 2018-08-10 RX ADMIN — Medication 40 MILLIEQUIVALENT(S): at 18:29

## 2018-08-10 RX ADMIN — Medication 100 MILLIEQUIVALENT(S): at 19:59

## 2018-08-10 NOTE — ED BEHAVIORAL HEALTH ASSESSMENT NOTE - OTHER
chronically low end of fair to limited roommate CVM not malodorous - could not smell feces or urine as per EMS second-hand report from a roommate limited concrete poor self care resulting in urinating and defecating on himself not malodorous as he was showered in the ED by staff on presentation - could not smell feces or urine as per EMS second-hand report from a roommate intermittent "fine" looks serious, solemn concrete;+ guarded so paucity of thought +very guarded so difficulty ascertaining thought content does not seem to be responding to internal stimuli

## 2018-08-10 NOTE — ED PROVIDER NOTE - PHYSICAL EXAMINATION
PHYSICAL EXAM:  GENERAL: NAD  HEAD:  Atraumatic, Normocephalic  EYES: EOMI, PERRLA, conjunctiva and sclera clear  ENMT: No tonsillar erythema, exudates, or enlargement; Moist mucous membranes, Good dentition  NECK: Supple, No JVD  NERVOUS SYSTEM: AOX3, motor and sensation grossly intact in b/l UE and b/l LE  PSYCHIATRIC: Appropriate affect and mood  CHEST/LUNG: Clear to auscultation bilaterally; No rales, rhonchi, wheezing, or rubs  HEART: Regular rate and rhythm; No murmurs, rubs, or gallops. No LE edema  ABDOMEN: Soft, Nontender, Nondistended; Bowel sounds present  EXTREMITIES:  2+ Peripheral Pulses, No clubbing, cyanosis  SKIN: +erythematous papular rash on chest and back.

## 2018-08-10 NOTE — ED BEHAVIORAL HEALTH ASSESSMENT NOTE - HPI (INCLUDE ILLNESS QUALITY, SEVERITY, DURATION, TIMING, CONTEXT, MODIFYING FACTORS, ASSOCIATED SIGNS AND SYMPTOMS)
PATIENT IS WELL KNOWN TO  SERVICE: Pt is a 59y/o  male, single, non caregiver, on disability for mental illness, currently domiciled in a private home - recently got discharged by Derrick Aguilar from inpatient [place] [facility] (Hospital's Order of retention and medications over objection was denied); previously was domiciled in [facility], PPhx Schizophrenia, no reported h/o of suicidality or violence or legal trouble, multiple prior hospitalizations (per records, most recently at [facility] 12/19/17-2/1/18 with transfer to [facility] center on 2/1/18 for long term psych care), PMH of htn, BPH, BIB EMS picked up on [facility], pt states he called 911 for not feeling well, and denies being a resident of [facility]. Of note pt with same presentation on 3/3/15.    Patient is calm, cooperative with interview and provided appropriate answers.     Collateral from Jorge at [facility] [place] ([phone]):  Patient has been living at [facility] [place] 6 months. He has no recent inpatient hospitalizations. At baseline he is quiet, nonviolent, not expressing psychotic or depressive symptoms.  Patient has been at his baseline and has not expressed CAH, SIIP, HIIP, or any other psychotic symptoms. They did not know he had come to the ED tonight. He has been in good behavioral control at [facility] [place]. He is welcome to come back.  He is compliant with medications. PATIENT IS WELL KNOWN TO  SERVICE: Pt is a 57y/o  male, single, non caregiver, on disability for mental illness, currently domiciled in a private home - recently got discharged by Judge Aguilar from inpatient [place] (Hospital's Order of retention and medications over objection was denied); previously was domiciled in [facility] [place] house, PPhx Schizophrenia, no reported h/o of suicidality or violence, substance use or legal trouble, multiple prior hospitalizations (per records, most recently at [facility] 12/19/17-2/1/18 with transfer to [facility] on 2/1/18 for long term psych care), BIB EMS from his home for reportedly not moving from his steps for 2 weeks and urinating and defecating on himself - Patient must have been showered as he did not smell malodorous at the time of the psychiatric assessment.     Patient is calm, cooperative with interview and provided appropriate answers but is very guarded and provides minimal answers. He has cooperated with medical treatment and work up thus far but had the hep-lock removed because it was hurting. Denies feeling suicidal/homicidal sad or depressed. Reports that he lives with his fiancee (he has a hx of reporting he has a fiancee and he never did). States he came to ED "to get blood drawn." He states that the best way to help him is to "leave me alone." Does not answer questions to what has transpired that he defecated and urinated on himself at home. Admits that he did not fill any prescriptions for any medications since discharge, does not want to see a psychiatrist. Denies substance / drug use.         Collateral from EMS run sheet: picked Pt up from a [place] address; no other person's name or contact info on it. Reports that a roommate and a health care worker called 911 because Patient was soiled and wasn't moving from his front steps.     COLLATERAL: none available. Patient does not have any active family or friends involved and unable to give any names/numbers. patient has a long hx of not having any collateral unless he lives in a residence. [facility] provides extensive history on Patient going back to 1997. Patient has a hx of poor self care including being covered in feces, living in fil, poor hygiene and grooming when untreated.

## 2018-08-10 NOTE — ED BEHAVIORAL HEALTH ASSESSMENT NOTE - DETAILS
patient cannot recall which unit he was on; called [place]  who has no access to patient records discharged from inpatient [facility] about 3 weeks ago mother and sister both have schizophrenia CL team aware - signed out for weekend CL follow up unclear who called [phone]

## 2018-08-10 NOTE — ED BEHAVIORAL HEALTH ASSESSMENT NOTE - DESCRIPTION
calm, cooperative htn, bph, arthritis; fibromyalgia used to live at [facility] [place] in [place], for most of his life lives with his family and then alone

## 2018-08-10 NOTE — ED BEHAVIORAL HEALTH ASSESSMENT NOTE - ORIENTATION OTHER
Patient knew when he was discharged from [place], recalled the name of the , correctly listed his medical history

## 2018-08-10 NOTE — ED PROVIDER NOTE - MEDICAL DECISION MAKING DETAILS
Endy: Schizophrenia or schizo-affective disorder, released from [facility], HTN, hypothyroid, GERD, anxiety, BIB EMS for urinating and defecating on self. Denies this. No complaints. Can't do stairs b/c arthritis, so urinates in a bottle. Has (mostly) non-itchy erythematous papules on back and abdomen. Plan: Review EMS note, collateral information.

## 2018-08-10 NOTE — ED PROVIDER NOTE - OBJECTIVE STATEMENT
57M PMH schizoaffective disorder, HTN, hypothyroidism, GERD BIBEMS for medical evaluation.  Per EMS patient has been urinating and defacating on himself and hasn't gotten up off the couch in weeks.  Patient denies urinating and defacating, states that he urinates into a bottle, has difficulty getting to the bathroom because it is upstairs and he has arthritis and has trouble walking.  Denies chest pain, shortness of breath, dysuria, fevers, chills, cough.  No complaints currently, states that he was brought in by EMS for bloodwork. 57M PMH schizoaffective disorder, HTN, hypothyroidism, GERD BIBEMS for medical evaluation.  Per EMS report, patient has been urinating and defacating on himself and hasn't gotten up off the couch in weeks.  Patient denies urinating and defacating, states that he urinates into a bottle, has difficulty getting to the bathroom because it is upstairs and he has arthritis and has trouble walking.  Denies chest pain, shortness of breath, dysuria, fevers, chills, cough.  No complaints currently, states that he was brought in by EMS for bloodwork. 58M PMH schizoaffective disorder, HTN, hypothyroidism, GERD BIBEMS for medical evaluation.  Per EMS report, patient has been urinating and defacating on himself and hasn't gotten up off the couch in weeks.  Patient denies urinating and defacating, states that he urinates into a bottle, has difficulty getting to the bathroom because it is upstairs and he has arthritis and has trouble walking.  Denies chest pain, shortness of breath, dysuria, fevers, chills, cough.  No complaints currently, states that he was brought in by EMS for bloodwork. 58M PMH schizoaffective disorder, HTN, hypothyroidism, GERD BIBEMS for medical evaluation.  Per EMS report, patient's roomate and health clinic staff were on scene and stated that he has been sitting on the couch for 2 weeks, has been urinating and defacating on himself.  Patient denies urinating and defacating, states that he urinates into a bottle, has difficulty getting to the bathroom because it is upstairs and he has arthritis and has trouble walking.  Denies chest pain, shortness of breath, dysuria, fevers, chills, cough.  No complaints currently, states that he was brought in by EMS for bloodwork. 58M PMH schizoaffective disorder, HTN, hypothyroidism, GERD BIBEMS for medical evaluation.  Per EMS report, patient's roomate and health clinic staff were on scene and stated that he has been sitting on the couch for 2 weeks, has been urinating and defecating on himself.  Patient denies urinating and defecating, states that he urinates into a bottle, has difficulty getting to the bathroom because it is upstairs and he has arthritis and has trouble walking.  Denies chest pain, shortness of breath, dysuria, fevers, chills, cough.  No complaints currently, states that he was brought in by EMS for bloodwork.

## 2018-08-10 NOTE — ED PROVIDER NOTE - ATTENDING CONTRIBUTION TO CARE
I performed a face-to-face evaluation of the patient and performed a history and physical examination. I agree with the history and physical examination.    Endy: Schizophrenia or schizo-affective disorder, released from [facility], HTN, hypothyroid, GERD, anxiety, BIB EMS for urinating and defecating on self. Denies this. No complaints. Can't do stairs b/c arthritis, so urinates in a bottle. Has (mostly) non-itchy erythematous papules on back and abdomen. Plan: Review EMS note, collateral information.

## 2018-08-10 NOTE — ED BEHAVIORAL HEALTH ASSESSMENT NOTE - OTHER PAST PSYCHIATRIC HISTORY (INCLUDE DETAILS REGARDING ONSET, COURSE OF ILLNESS, INPATIENT/OUTPATIENT TREATMENT)
- Pt was seeing psychiatrist Dr. Rolando Pillai x 14 years for medication management at [place] (per our records his case at [facility] was closed in May 2013). As per Dr Pillai, Pt had no psychosis during those 14 years   - [facility] admission 10/14/10-10/18/10 (paranoia, delusions that the Un was after him, picked up by police in front of a pizzeria screaming, yelling and being agitated)  - 2010 [facility] discharge clinic note: "Pt is a 50 y.o. SWM admitted [facility] the [facility] on [facility] witha diagnosis of Depressive D/O NOS and Personality D/O NOS. His major complaints were fatigue, anxiety, insomnia and dysthymia. His mother and sister were severely afflicted with Schizophrenia. He was enmenshed with his mother and spent much time taking her to dialysis and, while there serving as a "therapist" to the other pts. Her death was followed by pathological grief and a failed attempt to shira [facility] for wrongful death. Multiple attempts to engage him in therapy had failed. He had been prescribed Amphetamine salts since 2009....Pt was stable on treatment and functioned resonably well (he reapired and rented out part of his house). On 10/4/2010 he   presented with an acute delusional disorder believing he was the target of a [employer] cult Amphetamines were stopped and a followup scheduled in two days. He cancelled this appointment saying he was "fine." On 10/14 he was brought in by the police for bizarre and delusionally-driven behavior. He was admitted at that time."  - [facility] 8T 6/13-4/14, discharged to [facility]).  - [facility] 12/19/17-2/1/18 (BIB EMS picked up from home after neighbors called 911 due to odor coming from the pt’s residence which was filled with garbage, mold, live rats walking in residence, what appeared to be fecal matter thrown against the walls. Pt agitated, referred to himself as "Almighty God," actively psychotic, responding to internal stimuli, intermittently signing songs. He was a 9.39 admission; put on zydis 30mg po qhs without great effect; placed on clozapine at titrated to 350mg PO qhs and transferred to [facility] on 2/1/18 where)

## 2018-08-10 NOTE — ED ADULT TRIAGE NOTE - CHIEF COMPLAINT QUOTE
Pt from home, was released by court order from [place].  Pt has allegedly not moved off of his cough at home for past 2 weeks.  Per EMS, pt has been urinating and defecating on himself.  Charge RN notified that pt will need shower.

## 2018-08-10 NOTE — ED ADULT NURSE NOTE - NSIMPLEMENTINTERV_GEN_ALL_ED
Implemented All Universal Safety Interventions:  Gobles to call system. Call bell, personal items and telephone within reach. Instruct patient to call for assistance. Room bathroom lighting operational. Non-slip footwear when patient is off stretcher. Physically safe environment: no spills, clutter or unnecessary equipment. Stretcher in lowest position, wheels locked, appropriate side rails in place.

## 2018-08-10 NOTE — ED BEHAVIORAL HEALTH ASSESSMENT NOTE - RISK ASSESSMENT
currently at elevated risk to self as he is obviously unable to meet his basic needs in the community in his current state which includes not taking any medications (both medical and psychiatric), not able to perform basic tasks of releaving himself in a bathroom - instead of soiling himself with both urine and feces while sitting in one place.

## 2018-08-10 NOTE — ED BEHAVIORAL HEALTH ASSESSMENT NOTE - SUMMARY
Patient is well known to psychiatric services including outpatient treatment from 1997 - initial start with depression and Personality Disorder NOS with mother's death being a major turning point in his life after which time Patient started to exhibit psychotic symptoms, paranoid delusions as well as poor self care (ie. living in squalor, urine and feces covering home and Patient) which have been chronic for years who actually "won" his case in Court about 3 weeks ago and was released by the  from Mental [address] Court after [facility] did not win its motion for Medication Over Objection / Order of Retention. He has not taken any medications or seen a psychiatrist since that time. Patient was BIB from home by EMS today covered in feces and urine, reportedly not functioning and sitting in one place for about 2 weeks. Patient Patient is well known to psychiatric services including outpatient treatment from 1997 - initial start with depression and Personality Disorder NOS with mother's death being a major turning point in his life after which time Patient started to exhibit psychotic symptoms, paranoid delusions as well as poor self care (ie. living in squalor, urine and feces covering home and Patient) which have been chronic for years who actually "won" his case in Court about 3 weeks ago and was released by the  from Mental Hygiene Court after [facility] did not win its motion for Medication Over Objection / Order of Retention. He has not taken any medications or seen a psychiatrist since that time. Patient was BIB from home by EMS today covered in feces and urine, reportedly not functioning and sitting in one place for about 2 weeks. Unclear who called 911 (EMS report stated a roommate and a health care worker but did not write down their names/phone #) hence no collateral available. Based on Pt's history and degree of psychosis and agitation when totally decompensated, Patient is well on his way into another episode of total decompensation as indicated in the condition in which he was found (covered in human waste) and labs (low sodium, elevated creatinine, etc) further supporting poor self care/self-neglect. Patient would benefit from inpatient psychiatric care for stabilization. However, his labs at this time should be medically optimized before he can go to inpatient psychiatric service.

## 2018-08-10 NOTE — ED BEHAVIORAL HEALTH ASSESSMENT NOTE - PSYCHIATRIC ISSUES AND PLAN (INCLUDE STANDING AND PRN MEDICATION)
Zydis 15mg PO qhs; at this time, would no re-initiate clozapine given Pt's long hx noncompliance (would need weekly blood draws; also recent discharge from Court); Zydis 15mg PO qhs; Zyprexa 10mg IM for severe agitation; at this time, would no re-initiate clozapine given Pt's long hx noncompliance (would need weekly blood draws; also recent discharge from Court);

## 2018-08-10 NOTE — ED ADULT NURSE NOTE - CHIEF COMPLAINT QUOTE
Pt from home, was released by court order from [place].  Pt has allegedly not moved off of his cough at home for past 2 weeks.  Per EMS, pt has been urinating and defecating on himself.  Charge RN notified that pt will need shower.

## 2018-08-10 NOTE — ED PROVIDER NOTE - NS ED ROS FT
CONSTITUTIONAL: No fever, weight loss, or fatigue  EYES: No eye pain, visual disturbances, or discharge  ENMT:  No difficulty hearing, tinnitus, vertigo; No sinus or throat pain  RESPIRATORY: No cough, wheezing, chills or hemoptysis; No shortness of breath  CARDIOVASCULAR: No chest pain, palpitations, dizziness, or leg swelling  GASTROINTESTINAL: No abdominal or epigastric pain. No nausea, vomiting, or hematemesis; No diarrhea or constipation. No melena or hematochezia.  GENITOURINARY: No dysuria, frequency, hematuria, or incontinence  NEUROLOGICAL: No headaches, loss of strength, numbness, or tremors  SKIN: No itching, burning, rashes, or lesions   LYMPH NODES: No enlarged glands  ENDOCRINE: No heat or cold intolerance; No polydipsia or polyuria  MUSCULOSKELETAL: No joint pain or swelling;

## 2018-08-10 NOTE — ED ADULT NURSE NOTE - OBJECTIVE STATEMENT
Patient is brought in by EMS for medical evaluation, patient is alert and orients to person and place, not time, unable to give history , safety precautions maintain per hospital policy

## 2018-08-10 NOTE — ED BEHAVIORAL HEALTH ASSESSMENT NOTE - CURRENT MEDICATION
Flomax 0.4 mg qHS, Bentyl 10 mg four times daily, atenolol 50   mg daily, Xarelto 10 mg daily (DVT prophylaxis) most recent psych medications - Clozapine 350mg PO qbedtime and Depakote 1250mg PO daily which Pt does not take. Flomax 0.4 mg qHS, Bentyl 10 mg four times daily, atenolol 50mg daily, Xarelto 10 mg daily (DVT prophylaxis)

## 2018-08-11 DIAGNOSIS — E87.1 HYPO-OSMOLALITY AND HYPONATREMIA: ICD-10-CM

## 2018-08-11 DIAGNOSIS — F20.9 SCHIZOPHRENIA, UNSPECIFIED: ICD-10-CM

## 2018-08-11 DIAGNOSIS — N17.9 ACUTE KIDNEY FAILURE, UNSPECIFIED: ICD-10-CM

## 2018-08-11 DIAGNOSIS — E03.9 HYPOTHYROIDISM, UNSPECIFIED: ICD-10-CM

## 2018-08-11 DIAGNOSIS — Z29.9 ENCOUNTER FOR PROPHYLACTIC MEASURES, UNSPECIFIED: ICD-10-CM

## 2018-08-11 DIAGNOSIS — E83.51 HYPOCALCEMIA: ICD-10-CM

## 2018-08-11 DIAGNOSIS — I10 ESSENTIAL (PRIMARY) HYPERTENSION: ICD-10-CM

## 2018-08-11 DIAGNOSIS — Z79.899 OTHER LONG TERM (CURRENT) DRUG THERAPY: ICD-10-CM

## 2018-08-11 DIAGNOSIS — E87.6 HYPOKALEMIA: ICD-10-CM

## 2018-08-11 LAB
BUN SERPL-MCNC: 26 MG/DL — HIGH (ref 7–23)
CALCIUM SERPL-MCNC: 8.7 MG/DL — SIGNIFICANT CHANGE UP (ref 8.4–10.5)
CHLORIDE SERPL-SCNC: 99 MMOL/L — SIGNIFICANT CHANGE UP (ref 98–107)
CO2 SERPL-SCNC: 20 MMOL/L — LOW (ref 22–31)
CREAT SERPL-MCNC: 0.91 MG/DL — SIGNIFICANT CHANGE UP (ref 0.5–1.3)
GLUCOSE SERPL-MCNC: 107 MG/DL — HIGH (ref 70–99)
MAGNESIUM SERPL-MCNC: 2.3 MG/DL — SIGNIFICANT CHANGE UP (ref 1.6–2.6)
PHOSPHATE SERPL-MCNC: 2.1 MG/DL — LOW (ref 2.5–4.5)
POTASSIUM SERPL-MCNC: 3.4 MMOL/L — LOW (ref 3.5–5.3)
POTASSIUM SERPL-SCNC: 3.4 MMOL/L — LOW (ref 3.5–5.3)
SODIUM SERPL-SCNC: 133 MMOL/L — LOW (ref 135–145)

## 2018-08-11 PROCEDURE — 99232 SBSQ HOSP IP/OBS MODERATE 35: CPT

## 2018-08-11 PROCEDURE — 99223 1ST HOSP IP/OBS HIGH 75: CPT

## 2018-08-11 RX ORDER — DIVALPROEX SODIUM 500 MG/1
5 TABLET, DELAYED RELEASE ORAL
Qty: 0 | Refills: 0 | COMMUNITY

## 2018-08-11 RX ORDER — POTASSIUM CHLORIDE 20 MEQ
40 PACKET (EA) ORAL ONCE
Qty: 0 | Refills: 0 | Status: COMPLETED | OUTPATIENT
Start: 2018-08-11 | End: 2018-08-11

## 2018-08-11 RX ORDER — MULTIVIT-MIN/FERROUS GLUCONATE 9 MG/15 ML
1 LIQUID (ML) ORAL
Qty: 0 | Refills: 0 | COMMUNITY

## 2018-08-11 RX ORDER — LEVOTHYROXINE SODIUM 125 MCG
1 TABLET ORAL
Qty: 0 | Refills: 0 | COMMUNITY

## 2018-08-11 RX ORDER — OLANZAPINE 15 MG/1
15 TABLET, FILM COATED ORAL AT BEDTIME
Qty: 0 | Refills: 0 | Status: DISCONTINUED | OUTPATIENT
Start: 2018-08-11 | End: 2018-08-11

## 2018-08-11 RX ORDER — CHOLECALCIFEROL (VITAMIN D3) 125 MCG
1 CAPSULE ORAL
Qty: 0 | Refills: 0 | COMMUNITY

## 2018-08-11 RX ORDER — HEPARIN SODIUM 5000 [USP'U]/ML
5000 INJECTION INTRAVENOUS; SUBCUTANEOUS EVERY 8 HOURS
Qty: 0 | Refills: 0 | Status: DISCONTINUED | OUTPATIENT
Start: 2018-08-11 | End: 2018-08-14

## 2018-08-11 RX ORDER — OLANZAPINE 15 MG/1
15 TABLET, FILM COATED ORAL ONCE
Qty: 0 | Refills: 0 | Status: DISCONTINUED | OUTPATIENT
Start: 2018-08-11 | End: 2018-08-11

## 2018-08-11 RX ORDER — SODIUM CHLORIDE 9 MG/ML
1000 INJECTION INTRAMUSCULAR; INTRAVENOUS; SUBCUTANEOUS
Qty: 0 | Refills: 0 | Status: DISCONTINUED | OUTPATIENT
Start: 2018-08-11 | End: 2018-08-12

## 2018-08-11 RX ORDER — OLANZAPINE 15 MG/1
5 TABLET, FILM COATED ORAL EVERY 6 HOURS
Qty: 0 | Refills: 0 | Status: DISCONTINUED | OUTPATIENT
Start: 2018-08-11 | End: 2018-08-14

## 2018-08-11 RX ORDER — OLANZAPINE 15 MG/1
15 TABLET, FILM COATED ORAL
Qty: 0 | Refills: 0 | Status: DISCONTINUED | OUTPATIENT
Start: 2018-08-11 | End: 2018-08-14

## 2018-08-11 RX ORDER — ACETAMINOPHEN 500 MG
3 TABLET ORAL
Qty: 0 | Refills: 0 | COMMUNITY

## 2018-08-11 RX ORDER — CALCIUM GLUCONATE 100 MG/ML
1 VIAL (ML) INTRAVENOUS ONCE
Qty: 0 | Refills: 0 | Status: COMPLETED | OUTPATIENT
Start: 2018-08-11 | End: 2018-08-11

## 2018-08-11 RX ORDER — LISINOPRIL 2.5 MG/1
1 TABLET ORAL
Qty: 0 | Refills: 0 | COMMUNITY

## 2018-08-11 RX ORDER — CLOZAPINE 150 MG/1
25 TABLET, ORALLY DISINTEGRATING ORAL
Qty: 0 | Refills: 0 | Status: DISCONTINUED | OUTPATIENT
Start: 2018-08-11 | End: 2018-08-13

## 2018-08-11 RX ORDER — POTASSIUM CHLORIDE 20 MEQ
40 PACKET (EA) ORAL ONCE
Qty: 0 | Refills: 0 | Status: DISCONTINUED | OUTPATIENT
Start: 2018-08-11 | End: 2018-08-11

## 2018-08-11 RX ADMIN — Medication 40 MILLIEQUIVALENT(S): at 17:31

## 2018-08-11 RX ADMIN — CLOZAPINE 25 MILLIGRAM(S): 150 TABLET, ORALLY DISINTEGRATING ORAL at 17:34

## 2018-08-11 RX ADMIN — SODIUM CHLORIDE 100 MILLILITER(S): 9 INJECTION INTRAMUSCULAR; INTRAVENOUS; SUBCUTANEOUS at 11:41

## 2018-08-11 RX ADMIN — HEPARIN SODIUM 5000 UNIT(S): 5000 INJECTION INTRAVENOUS; SUBCUTANEOUS at 13:11

## 2018-08-11 RX ADMIN — HEPARIN SODIUM 5000 UNIT(S): 5000 INJECTION INTRAVENOUS; SUBCUTANEOUS at 22:31

## 2018-08-11 RX ADMIN — Medication 200 GRAM(S): at 11:49

## 2018-08-11 RX ADMIN — SODIUM CHLORIDE 100 MILLILITER(S): 9 INJECTION INTRAMUSCULAR; INTRAVENOUS; SUBCUTANEOUS at 22:31

## 2018-08-11 RX ADMIN — OLANZAPINE 15 MILLIGRAM(S): 15 TABLET, FILM COATED ORAL at 17:34

## 2018-08-11 RX ADMIN — Medication 63.75 MILLIMOLE(S): at 17:31

## 2018-08-11 NOTE — H&P ADULT - PROBLEM SELECTOR PLAN 7
BP stable and at goal. Awaiting medication history from pharmacy team as patient does not know his medications.

## 2018-08-11 NOTE — H&P ADULT - PROBLEM SELECTOR PLAN 5
In setting of reduced oral intake vs dilutional hypocalcemia after IVF. Normal albumin. Will give 1g calcium gluconate and repeat serum Ca.

## 2018-08-11 NOTE — PROGRESS NOTE BEHAVIORAL HEALTH - OTHER
looks serious, solemn concrete;+ guarded so paucity of thought +very guarded so difficulty ascertaining thought content

## 2018-08-11 NOTE — H&P ADULT - PROBLEM SELECTOR PLAN 8
No levothyroxine on psychiatry note listing current medications. Awaiting medication history from pharmacy team as patient does not know his medications.

## 2018-08-11 NOTE — H&P ADULT - HISTORY OF PRESENT ILLNESS
HPI:    58 M PMH schizoaffective disorder, essential HTN, hypothyroidism (all per chart) presents after being found on couch having defecated and urinated on himself. Per patient, he states "they" wanted him to come to the hospital for blood work. He does not elaborate on who "they" are.     Per ED charting, the patient was released from [facility] about 2 weeks ago and had been living with a roommate. The patient apparently did not get off the cough over the past 2 weeks and urinated and defecated on himself. He found by his roommate who contacted EMS. The patient has no recollection of this and states he lives with his fiancee. He presented to the ED malodorous and had to be bathed.     PAST MEDICAL & SURGICAL HISTORY:  Hypothyroidism  Anxiety  BPH (benign prostatic hyperplasia)  GERD (gastroesophageal reflux disease)  IBS (irritable bowel syndrome)  Schizophrenia  HTN (hypertension)  No significant past surgical history      Review of Systems:   CONSTITUTIONAL: No fever, weight loss, or fatigue  EYES: No eye pain, visual disturbances, or discharge  ENMT:  No difficulty hearing, tinnitus, vertigo; No sinus or throat pain  NECK: No pain or stiffness  BREASTS: No pain, masses, or nipple discharge  RESPIRATORY: No cough, wheezing, chills or hemoptysis; No shortness of breath  CARDIOVASCULAR: No chest pain, palpitations, dizziness, or leg swelling  GASTROINTESTINAL: No abdominal or epigastric pain. No nausea, vomiting, or hematemesis; No diarrhea or constipation. No melena or hematochezia.  GENITOURINARY: No dysuria, frequency, hematuria, or incontinence  NEUROLOGICAL: No headaches, memory loss, loss of strength, numbness, or tremors  SKIN: No itching, burning, rashes, or lesions   LYMPH NODES: No enlarged glands  ENDOCRINE: No heat or cold intolerance; No hair loss  MUSCULOSKELETAL: +joint pain in his knees and lower back pain   PSYCHIATRIC: No depression, anxiety, mood swings, or difficulty sleeping  HEME/LYMPH: No easy bruising, or bleeding gums  ALLERGY AND IMMUNOLOGIC: No hives or eczema    Allergies    No Known Allergies    Intolerances        Social History:   States he lives with his fiancee. Does not drink, smoke, or use drugs per his report.    FAMILY HISTORY:  No pertinent family history in first degree relatives      MEDICATIONS  (STANDING):  heparin  Injectable 5000 Unit(s) SubCutaneous every 8 hours  sodium chloride 0.9%. 1000 milliLiter(s) (100 mL/Hr) IV Continuous <Continuous>    MEDICATIONS  (PRN):      T(C): 36.7 (08-11-18 @ 08:35), Max: 36.9 (08-10-18 @ 15:16)  HR: 60 (08-11-18 @ 08:35) (60 - 92)  BP: 126/92 (08-11-18 @ 08:35) (126/92 - 136/80)  RR: 18 (08-11-18 @ 08:35) (14 - 18)  SpO2: 100% (08-11-18 @ 08:35) (98% - 100%)  Height (cm): 172.72 (08-11-18 @ 08:35)  Weight (kg): 77 (08-11-18 @ 08:35)  BMI (kg/m2): 25.8 (08-11-18 @ 08:35)  BSA (m2): 1.91 (08-11-18 @ 08:35)  CAPILLARY BLOOD GLUCOSE        I&O's Summary      PHYSICAL EXAM:  GENERAL: Thin man with unruly beard, lying in bed, in NAD  HEAD:  Atraumatic, Normocephalic  EYES: EOMI, PERRLA, conjunctiva and sclera clear  NECK: Supple, No elevated JVD  CHEST/LUNG: Clear to auscultation bilaterally; No wheeze  HEART: Regular rate and rhythm; No murmurs, rubs, or gallops  ABDOMEN: Soft, Nontender, Nondistended; Bowel sounds present  EXTREMITIES:  2+ Peripheral Pulses, No clubbing, cyanosis, or edema  PSYCH: AAOx3  NEUROLOGY: CN II-XII grossly intact, moving all extremities  SKIN: No rashes or lesions    LABS:                        16.2   9.67  )-----------( 235      ( 10 Aug 2018 19:23 )             45.5     08-10    130<L>  |  94<L>  |  32<H>  ----------------------------<  111<H>  3.2<L>   |  20<L>  |  1.43<H>    Ca    7.6<L>      10 Aug 2018 19:23    TPro  9.1<H>  /  Alb  4.3  /  TBili  1.0  /  DBili  x   /  AST  17  /  ALT  8   /  AlkPhos  117  08-10      CARDIAC MARKERS ( 10 Aug 2018 19:23 )  x     / x     / 89 u/L / x     / x                RADIOLOGY & ADDITIONAL TESTS:    ECG Personally Reviewed -     Imaging Personally Reviewed:    Consultant(s) Notes Reviewed:  Psychiatry    Care Discussed with Consultants/Other Providers:

## 2018-08-11 NOTE — H&P ADULT - PROBLEM SELECTOR PLAN 3
Hyponatremia likely secondary to reduced oral intake. Improving with IVNS. Will c/w NS at 100 cc/hr and repeat chemistry today.

## 2018-08-11 NOTE — H&P ADULT - PROBLEM SELECTOR PLAN 1
Patient with h/o schizophrenia vs schizoaffective disorder; recently discharged from [facility] by court order. Now found to be covered in feces and urine because he has not left sofa for 2 weeks. Inability to self-care likely 2/2 psychiatric disease and medication non-adherence. Psychiatry recommendations reviewed.    -resume olanzapine 15mg PO hs  -olanzapine 10mg IM prn severe agitation  -f/u psychiatry C&L recommendations  -social work c/s

## 2018-08-11 NOTE — H&P ADULT - ASSESSMENT
58 M PMH schizoaffective disorder, essential HTN, hypothyroidism (all per chart) presents after being found on couch having defecated and urinated on himself. Afebrile, normotensive with normal HR. On exam, he exhibits moist oral mucosa, he is alert, oriented. Labs initially with leukocytosis, polycythemia, but now within normal range after IVF. Chemistry reveals Na 130, K 3.2, sCr 1.43 (baseline near 1), Ca 7.6. Serum APAP <15, salicylate <5, EtOH <10.

## 2018-08-11 NOTE — H&P ADULT - PROBLEM SELECTOR PLAN 4
Hypokalemia in setting of reduced oral intake. Given 50mEq KCl yesterday in ED. Will repeat chemistry today and administer KCl if needed.

## 2018-08-11 NOTE — H&P ADULT - PROBLEM SELECTOR PLAN 2
SANDIE 2/2 reduced oral intake and pre-renal insult. Will c/w IVNS at 100cc/hr and repeat chemistry today.

## 2018-08-11 NOTE — ED ADULT NURSE REASSESSMENT NOTE - NS ED NURSE REASSESS COMMENT FT1
0510 - Sleeping, easily aroused by verbal stimuli, awake and alert, NAD observed, respirations even and unlabored on room air. Patient refusing IV access at this time. MAR aware. Patient moved to ESSU 1. Admitted to Med, awaiting bed assignment.

## 2018-08-11 NOTE — H&P ADULT - PROBLEM SELECTOR PLAN 6
Patient does not know what medications he takes or what pharmacy he uses. He likely has not taken medications since discharge from [facility].     Per psychiatry note, patient's current medication list includes:  Clozapine 350 hs, Depakote 1250 daily, Flomax 0.4mg hs, Bentyl 10mg qid, Atenolol 50mg daily, Xarelto 10mg for dvt ppx.    No urgent indication to continue these (pscyhotropic medications are being dosed as above). DVT prophylaxis in absence of known h/o clot or pro-thrombotic arrythmia is not an indication for DOAC use. Will provide chemical DVT ppx with HSQ instead.    Pharmacy team consulted for assistance with determining patient's medication regimen.

## 2018-08-12 DIAGNOSIS — R21 RASH AND OTHER NONSPECIFIC SKIN ERUPTION: ICD-10-CM

## 2018-08-12 LAB
ALBUMIN SERPL ELPH-MCNC: 3.4 G/DL — SIGNIFICANT CHANGE UP (ref 3.3–5)
ALP SERPL-CCNC: 97 U/L — SIGNIFICANT CHANGE UP (ref 40–120)
ALT FLD-CCNC: 7 U/L — SIGNIFICANT CHANGE UP (ref 4–41)
AST SERPL-CCNC: 19 U/L — SIGNIFICANT CHANGE UP (ref 4–40)
BASOPHILS # BLD AUTO: 0.05 K/UL — SIGNIFICANT CHANGE UP (ref 0–0.2)
BASOPHILS NFR BLD AUTO: 0.4 % — SIGNIFICANT CHANGE UP (ref 0–2)
BILIRUB SERPL-MCNC: 0.5 MG/DL — SIGNIFICANT CHANGE UP (ref 0.2–1.2)
BUN SERPL-MCNC: 21 MG/DL — SIGNIFICANT CHANGE UP (ref 7–23)
CALCIUM SERPL-MCNC: 9 MG/DL — SIGNIFICANT CHANGE UP (ref 8.4–10.5)
CHLORIDE SERPL-SCNC: 103 MMOL/L — SIGNIFICANT CHANGE UP (ref 98–107)
CO2 SERPL-SCNC: 20 MMOL/L — LOW (ref 22–31)
CREAT SERPL-MCNC: 0.93 MG/DL — SIGNIFICANT CHANGE UP (ref 0.5–1.3)
EOSINOPHIL # BLD AUTO: 0.53 K/UL — HIGH (ref 0–0.5)
EOSINOPHIL NFR BLD AUTO: 4.7 % — SIGNIFICANT CHANGE UP (ref 0–6)
GLUCOSE SERPL-MCNC: 78 MG/DL — SIGNIFICANT CHANGE UP (ref 70–99)
HCT VFR BLD CALC: 49.8 % — SIGNIFICANT CHANGE UP (ref 39–50)
HGB BLD-MCNC: 16.9 G/DL — SIGNIFICANT CHANGE UP (ref 13–17)
IMM GRANULOCYTES # BLD AUTO: 0.03 # — SIGNIFICANT CHANGE UP
IMM GRANULOCYTES NFR BLD AUTO: 0.3 % — SIGNIFICANT CHANGE UP (ref 0–1.5)
LYMPHOCYTES # BLD AUTO: 3.59 K/UL — HIGH (ref 1–3.3)
LYMPHOCYTES # BLD AUTO: 31.8 % — SIGNIFICANT CHANGE UP (ref 13–44)
MAGNESIUM SERPL-MCNC: 2.2 MG/DL — SIGNIFICANT CHANGE UP (ref 1.6–2.6)
MCHC RBC-ENTMCNC: 30 PG — SIGNIFICANT CHANGE UP (ref 27–34)
MCHC RBC-ENTMCNC: 33.9 % — SIGNIFICANT CHANGE UP (ref 32–36)
MCV RBC AUTO: 88.5 FL — SIGNIFICANT CHANGE UP (ref 80–100)
MONOCYTES # BLD AUTO: 1.15 K/UL — HIGH (ref 0–0.9)
MONOCYTES NFR BLD AUTO: 10.2 % — SIGNIFICANT CHANGE UP (ref 2–14)
NEUTROPHILS # BLD AUTO: 5.93 K/UL — SIGNIFICANT CHANGE UP (ref 1.8–7.4)
NEUTROPHILS NFR BLD AUTO: 52.6 % — SIGNIFICANT CHANGE UP (ref 43–77)
NRBC # FLD: 0 — SIGNIFICANT CHANGE UP
PHOSPHATE SERPL-MCNC: 3.4 MG/DL — SIGNIFICANT CHANGE UP (ref 2.5–4.5)
PLATELET # BLD AUTO: 234 K/UL — SIGNIFICANT CHANGE UP (ref 150–400)
PMV BLD: 10.3 FL — SIGNIFICANT CHANGE UP (ref 7–13)
POTASSIUM SERPL-MCNC: 3.9 MMOL/L — SIGNIFICANT CHANGE UP (ref 3.5–5.3)
POTASSIUM SERPL-SCNC: 3.9 MMOL/L — SIGNIFICANT CHANGE UP (ref 3.5–5.3)
PROT SERPL-MCNC: 7.3 G/DL — SIGNIFICANT CHANGE UP (ref 6–8.3)
RBC # BLD: 5.63 M/UL — SIGNIFICANT CHANGE UP (ref 4.2–5.8)
RBC # FLD: 13.4 % — SIGNIFICANT CHANGE UP (ref 10.3–14.5)
SODIUM SERPL-SCNC: 135 MMOL/L — SIGNIFICANT CHANGE UP (ref 135–145)
WBC # BLD: 11.28 K/UL — HIGH (ref 3.8–10.5)
WBC # FLD AUTO: 11.28 K/UL — HIGH (ref 3.8–10.5)

## 2018-08-12 PROCEDURE — 99233 SBSQ HOSP IP/OBS HIGH 50: CPT

## 2018-08-12 RX ORDER — SODIUM CHLORIDE 9 MG/ML
1000 INJECTION, SOLUTION INTRAVENOUS
Qty: 0 | Refills: 0 | Status: DISCONTINUED | OUTPATIENT
Start: 2018-08-12 | End: 2018-08-13

## 2018-08-12 RX ADMIN — OLANZAPINE 15 MILLIGRAM(S): 15 TABLET, FILM COATED ORAL at 17:28

## 2018-08-12 RX ADMIN — SODIUM CHLORIDE 75 MILLILITER(S): 9 INJECTION, SOLUTION INTRAVENOUS at 18:06

## 2018-08-12 RX ADMIN — CLOZAPINE 25 MILLIGRAM(S): 150 TABLET, ORALLY DISINTEGRATING ORAL at 17:28

## 2018-08-12 RX ADMIN — HEPARIN SODIUM 5000 UNIT(S): 5000 INJECTION INTRAVENOUS; SUBCUTANEOUS at 05:18

## 2018-08-12 RX ADMIN — SODIUM CHLORIDE 100 MILLILITER(S): 9 INJECTION INTRAMUSCULAR; INTRAVENOUS; SUBCUTANEOUS at 05:18

## 2018-08-12 RX ADMIN — HEPARIN SODIUM 5000 UNIT(S): 5000 INJECTION INTRAVENOUS; SUBCUTANEOUS at 14:24

## 2018-08-12 NOTE — PROGRESS NOTE ADULT - SUBJECTIVE AND OBJECTIVE BOX
Patient is a 58y old  Male who presents with a chief complaint of "I need blood work" (11 Aug 2018 10:16)    SUBJECTIVE / OVERNIGHT EVENTS:      MEDICATIONS  (STANDING):  cloZAPine 25 milliGRAM(s) Oral <User Schedule>  dextrose 5% + lactated ringers. 1000 milliLiter(s) (75 mL/Hr) IV Continuous <Continuous>  heparin  Injectable 5000 Unit(s) SubCutaneous every 8 hours  OLANZapine Disintegrating Tablet 15 milliGRAM(s) Oral <User Schedule>    MEDICATIONS  (PRN):  OLANZapine Disintegrating Tablet 5 milliGRAM(s) Oral every 6 hours PRN agitation  OLANZapine Injectable 5 milliGRAM(s) IntraMuscular every 6 hours PRN severe agitation    I&O's Summary    11 Aug 2018 07:01  -  12 Aug 2018 07:00  --------------------------------------------------------  IN: 1900 mL / OUT: 900 mL / NET: 1000 mL    12 Aug 2018 07:01  -  12 Aug 2018 16:28  --------------------------------------------------------  IN: 800 mL / OUT: 0 mL / NET: 800 mL    Vital Signs Last 24 Hrs  T(C): 37 (12 Aug 2018 14:44), Max: 37 (12 Aug 2018 14:44)  T(F): 98.6 (12 Aug 2018 14:44), Max: 98.6 (12 Aug 2018 14:44)  HR: 67 (12 Aug 2018 14:44) (61 - 67)  BP: 120/81 (12 Aug 2018 14:44) (120/81 - 140/87)  RR: 18 (12 Aug 2018 14:44) (17 - 18)  SpO2: 100% (12 Aug 2018 14:44) (100% - 100%)    PHYSICAL EXAM:  GENERAL: Thin man with unruly beard, lying in bed, in NAD, unkempt, strong odor of urine  HEAD:  Atraumatic, Normocephalic; ecchymosis forehead  EYES: EOMI, PERRLA, conjunctiva and sclera clear  NECK: Supple, No elevated JVD  CHEST/LUNG: Clear to auscultation bilaterally; No wheeze  HEART: Regular rate and rhythm; No murmurs, rubs, or gallops  ABDOMEN: Soft, Nontender, Nondistended; Bowel sounds present  EXTREMITIES:  2+ Peripheral Pulses, No clubbing, cyanosis, or edema  PSYCH: minimally cooperative,   NEUROLOGY: CN II-XII grossly intact, moving all extremities  SKIN: diffuse macular rash    LABS:             16.9   11.28 )-----------( 234      ( 12 Aug 2018 05:24 )             49.8     135  |  103  |  21  ----------------------------<  78  3.9   |  20<L>  |  0.93    Ca    9.0      12 Aug 2018 05:24  Phos  3.4     08-12  Mg     2.2     08-12    TPro  7.3  /  Alb  3.4  /  TBili  0.5  /  DBili  x   /  AST  19  /  ALT  7   /  AlkPhos  97  08-12    CARDIAC MARKERS ( 10 Aug 2018 19:23 )  x     / x     / 89 u/L / x     / x        RADIOLOGY & ADDITIONAL TESTS:    Imaging Personally Reviewed:    Consultant(s) Notes Reviewed:  psych    Care Discussed with Consultants/Other Providers: RN, PATRICIA re overall care CC: SANDIE,     SUBJECTIVE / OVERNIGHT EVENTS:  Pt dismissive, does not want to talk.  Said he's doing fine, there's nothing wrong.  Wouldn't answer ?s.    MEDICATIONS  (STANDING):  cloZAPine 25 milliGRAM(s) Oral <User Schedule>  dextrose 5% + lactated ringers. 1000 milliLiter(s) (75 mL/Hr) IV Continuous <Continuous>  heparin  Injectable 5000 Unit(s) SubCutaneous every 8 hours  OLANZapine Disintegrating Tablet 15 milliGRAM(s) Oral <User Schedule>    MEDICATIONS  (PRN):  OLANZapine Disintegrating Tablet 5 milliGRAM(s) Oral every 6 hours PRN agitation  OLANZapine Injectable 5 milliGRAM(s) IntraMuscular every 6 hours PRN severe agitation    I&O's Summary    11 Aug 2018 07:01  -  12 Aug 2018 07:00  --------------------------------------------------------  IN: 1900 mL / OUT: 900 mL / NET: 1000 mL    12 Aug 2018 07:01  -  12 Aug 2018 16:28  --------------------------------------------------------  IN: 800 mL / OUT: 0 mL / NET: 800 mL    Vital Signs Last 24 Hrs  T(C): 37 (12 Aug 2018 14:44), Max: 37 (12 Aug 2018 14:44)  T(F): 98.6 (12 Aug 2018 14:44), Max: 98.6 (12 Aug 2018 14:44)  HR: 67 (12 Aug 2018 14:44) (61 - 67)  BP: 120/81 (12 Aug 2018 14:44) (120/81 - 140/87)  RR: 18 (12 Aug 2018 14:44) (17 - 18)  SpO2: 100% (12 Aug 2018 14:44) (100% - 100%)    PHYSICAL EXAM:  GENERAL: Thin man with unruly beard, lying in bed, in NAD, unkempt, strong odor of urine  HEAD:  Atraumatic, Normocephalic; ecchymosis forehead  EYES: EOMI, PERRLA, conjunctiva and sclera clear  NECK: Supple, No elevated JVD  CHEST/LUNG: Clear to auscultation bilaterally; No wheeze  HEART: Regular rate and rhythm; No murmurs, rubs, or gallops  ABDOMEN: Soft, Nontender, Nondistended; Bowel sounds present  EXTREMITIES:  2+ Peripheral Pulses, No clubbing, cyanosis, or edema  PSYCH: minimally cooperative,   NEUROLOGY: CN II-XII grossly intact, moving all extremities  SKIN: diffuse macular rash    LABS:             16.9   11.28 )-----------( 234      ( 12 Aug 2018 05:24 )             49.8     135  |  103  |  21  ----------------------------<  78  3.9   |  20<L>  |  0.93    Ca    9.0      12 Aug 2018 05:24  Phos  3.4     08-12  Mg     2.2     08-12    TPro  7.3  /  Alb  3.4  /  TBili  0.5  /  DBili  x   /  AST  19  /  ALT  7   /  AlkPhos  97  08-12    CARDIAC MARKERS ( 10 Aug 2018 19:23 )  x     / x     / 89 u/L / x     / x        RADIOLOGY & ADDITIONAL TESTS:    Imaging Personally Reviewed:    Consultant(s) Notes Reviewed:  psych    Care Discussed with Consultants/Other Providers: RN, PATRICIA re overall care

## 2018-08-13 DIAGNOSIS — L30.9 DERMATITIS, UNSPECIFIED: ICD-10-CM

## 2018-08-13 LAB
BUN SERPL-MCNC: 13 MG/DL — SIGNIFICANT CHANGE UP (ref 7–23)
CALCIUM SERPL-MCNC: 8.7 MG/DL — SIGNIFICANT CHANGE UP (ref 8.4–10.5)
CHLORIDE SERPL-SCNC: 105 MMOL/L — SIGNIFICANT CHANGE UP (ref 98–107)
CO2 SERPL-SCNC: 21 MMOL/L — LOW (ref 22–31)
CREAT SERPL-MCNC: 0.87 MG/DL — SIGNIFICANT CHANGE UP (ref 0.5–1.3)
GLUCOSE BLDC GLUCOMTR-MCNC: 101 MG/DL — HIGH (ref 70–99)
GLUCOSE BLDC GLUCOMTR-MCNC: 179 MG/DL — HIGH (ref 70–99)
GLUCOSE BLDC GLUCOMTR-MCNC: 87 MG/DL — SIGNIFICANT CHANGE UP (ref 70–99)
GLUCOSE SERPL-MCNC: 80 MG/DL — SIGNIFICANT CHANGE UP (ref 70–99)
HCT VFR BLD CALC: 44.8 % — SIGNIFICANT CHANGE UP (ref 39–50)
HGB BLD-MCNC: 15.2 G/DL — SIGNIFICANT CHANGE UP (ref 13–17)
MAGNESIUM SERPL-MCNC: 1.6 MG/DL — SIGNIFICANT CHANGE UP (ref 1.6–2.6)
MCHC RBC-ENTMCNC: 29.7 PG — SIGNIFICANT CHANGE UP (ref 27–34)
MCHC RBC-ENTMCNC: 33.9 % — SIGNIFICANT CHANGE UP (ref 32–36)
MCV RBC AUTO: 87.5 FL — SIGNIFICANT CHANGE UP (ref 80–100)
NRBC # FLD: 0 — SIGNIFICANT CHANGE UP
PHOSPHATE SERPL-MCNC: 2.6 MG/DL — SIGNIFICANT CHANGE UP (ref 2.5–4.5)
PLATELET # BLD AUTO: 219 K/UL — SIGNIFICANT CHANGE UP (ref 150–400)
PMV BLD: 10.9 FL — SIGNIFICANT CHANGE UP (ref 7–13)
POTASSIUM SERPL-MCNC: 3.6 MMOL/L — SIGNIFICANT CHANGE UP (ref 3.5–5.3)
POTASSIUM SERPL-SCNC: 3.6 MMOL/L — SIGNIFICANT CHANGE UP (ref 3.5–5.3)
RBC # BLD: 5.12 M/UL — SIGNIFICANT CHANGE UP (ref 4.2–5.8)
RBC # FLD: 13.6 % — SIGNIFICANT CHANGE UP (ref 10.3–14.5)
SODIUM SERPL-SCNC: 139 MMOL/L — SIGNIFICANT CHANGE UP (ref 135–145)
T4 FREE SERPL-MCNC: 1.41 NG/DL — SIGNIFICANT CHANGE UP (ref 0.9–1.8)
VIT B12 SERPL-MCNC: 948 PG/ML — HIGH (ref 200–900)
WBC # BLD: 8.23 K/UL — SIGNIFICANT CHANGE UP (ref 3.8–10.5)
WBC # FLD AUTO: 8.23 K/UL — SIGNIFICANT CHANGE UP (ref 3.8–10.5)

## 2018-08-13 PROCEDURE — 99223 1ST HOSP IP/OBS HIGH 75: CPT | Mod: GC

## 2018-08-13 PROCEDURE — 99232 SBSQ HOSP IP/OBS MODERATE 35: CPT

## 2018-08-13 PROCEDURE — 99233 SBSQ HOSP IP/OBS HIGH 50: CPT

## 2018-08-13 RX ORDER — CLOZAPINE 150 MG/1
50 TABLET, ORALLY DISINTEGRATING ORAL
Qty: 0 | Refills: 0 | Status: DISCONTINUED | OUTPATIENT
Start: 2018-08-13 | End: 2018-08-14

## 2018-08-13 RX ORDER — PETROLATUM,WHITE
1 JELLY (GRAM) TOPICAL
Qty: 0 | Refills: 0 | Status: DISCONTINUED | OUTPATIENT
Start: 2018-08-13 | End: 2018-08-14

## 2018-08-13 RX ADMIN — HEPARIN SODIUM 5000 UNIT(S): 5000 INJECTION INTRAVENOUS; SUBCUTANEOUS at 12:02

## 2018-08-13 RX ADMIN — CLOZAPINE 50 MILLIGRAM(S): 150 TABLET, ORALLY DISINTEGRATING ORAL at 20:47

## 2018-08-13 RX ADMIN — OLANZAPINE 15 MILLIGRAM(S): 15 TABLET, FILM COATED ORAL at 17:27

## 2018-08-13 RX ADMIN — Medication 1 APPLICATION(S): at 22:51

## 2018-08-13 NOTE — PROGRESS NOTE ADULT - SUBJECTIVE AND OBJECTIVE BOX
CC: SANDIE, psychosis    SUBJECTIVE / OVERNIGHT EVENTS:  No problems reported.  Pt says he's doing fine, taking care of himself at home without a problem.  Doesn't know how long he has had a rash, says it doesn't bother him but noted by staff to be scratching skin.  RN reports he is eating and drinking,    MEDICATIONS  (STANDING):  cloZAPine 50 milliGRAM(s) Oral <User Schedule>  heparin  Injectable 5000 Unit(s) SubCutaneous every 8 hours  OLANZapine Disintegrating Tablet 15 milliGRAM(s) Oral <User Schedule>    MEDICATIONS  (PRN):  OLANZapine Disintegrating Tablet 5 milliGRAM(s) Oral every 6 hours PRN agitation  OLANZapine Injectable 5 milliGRAM(s) IntraMuscular every 6 hours PRN severe agitation    CAPILLARY BLOOD GLUCOSE  100 (12 Aug 2018 22:06)  192 (12 Aug 2018 19:44)  POCT Blood Glucose.: 87 mg/dL (13 Aug 2018 11:57)  POCT Blood Glucose.: 85 mg/dL (13 Aug 2018 07:56)  POCT Blood Glucose.: 100 mg/dL (12 Aug 2018 21:54)  POCT Blood Glucose.: 192 mg/dL (12 Aug 2018 19:41)    Vital Signs Last 24 Hrs  T(C): 37.1 (13 Aug 2018 14:53), Max: 37.1 (13 Aug 2018 14:53)  T(F): 98.8 (13 Aug 2018 14:53), Max: 98.8 (13 Aug 2018 14:53)  HR: 59 (13 Aug 2018 14:53) (59 - 70)  BP: 129/86 (13 Aug 2018 14:53) (112/71 - 135/92)  RR: 18 (13 Aug 2018 14:53) (18 - 18)  SpO2: 100% (13 Aug 2018 14:53) (100% - 100%)    PHYSICAL EXAM:  GENERAL: Thin man with unruly beard, lying in bed, in NAD, unkempt, strong odor of urine  HEAD:  Atraumatic, Normocephalic; ecchymosis forehead  EYES: EOMI, PERRLA, conjunctiva and sclera clear  NECK: Supple, No elevated JVD  CHEST/LUNG: Clear to auscultation bilaterally; No wheeze  HEART: Regular rate and rhythm; No murmurs, rubs, or gallops  ABDOMEN: Soft, Nontender, Nondistended; Bowel sounds present  EXTREMITIES:  2+ Peripheral Pulses, No clubbing, cyanosis, or edema  PSYCH: superficially cooperative, oriented August 12, 2018, Emy  NEUROLOGY: CN II-XII grossly intact, moving all extremities  SKIN: diffuse macular rash    LABS:                        15.2   8.23  )-----------( 219      ( 13 Aug 2018 05:58 )             44.8     139  |  105  |  13  ----------------------------<  80  3.6   |  21<L>  |  0.87    Ca    8.7      13 Aug 2018 05:58  Phos  2.6     08-13  Mg     1.6     08-13    TPro  7.3  /  Alb  3.4  /  TBili  0.5  /  DBili  x   /  AST  19  /  ALT  7   /  AlkPhos  97  08-12    RADIOLOGY & ADDITIONAL TESTS:    Imaging Personally Reviewed:    Consultant(s) Notes Reviewed:      Care Discussed with Consultants/Other Providers: RN, SW, CM, ADS, psych re overall care

## 2018-08-13 NOTE — CONSULT NOTE ADULT - PROBLEM SELECTOR RECOMMENDATION 9
May be secondary to contact dermatitis  Unlikely drug rash given clinical picture and no new inciting meds  - Apply bland emollients (Vaseline or CeraVe) to affected area twice daily  - Dermatology to sign off on this patient. Please re-consult for further questions or concerns. Pt can follow up with 1-2 weeks after discharge from the hospital, [phone]

## 2018-08-13 NOTE — CONSULT NOTE ADULT - SUBJECTIVE AND OBJECTIVE BOX
HPI: 58 year old man PMH schizoaffective disorder, HTN, hypothyroidism who was admitted after being found on couch having defecated and urinated on himself. He was found to be hyponatremia with an SANDIE. We were consulted for rash on torso and extremities. Of note, patient is a poor historian. Patient does not know how long rash has been present. It is asymptomatic and never been treated.     PAST MEDICAL & SURGICAL HISTORY:  Hypothyroidism  Anxiety  BPH (benign prostatic hyperplasia)  GERD (gastroesophageal reflux disease)  IBS (irritable bowel syndrome)  Schizophrenia  HTN (hypertension)  No significant past surgical history      Review of Systems:  Constitutional: denies fevers, chills  HEENT: odynophagia or dysphagia  Cardiovascular: denies palpitations  Respiratory: denies SOB, wheezing  Gastrointestinal: denies N/V/D, abdominal pain  : denies dysuria  MSK: denies weakness, joint pain  Skin: denies new rashes or masses unless otherwise specified in hpi    MEDICATIONS  (STANDING):  cloZAPine 50 milliGRAM(s) Oral <User Schedule>  heparin  Injectable 5000 Unit(s) SubCutaneous every 8 hours  OLANZapine Disintegrating Tablet 15 milliGRAM(s) Oral <User Schedule>    MEDICATIONS  (PRN):  OLANZapine Disintegrating Tablet 5 milliGRAM(s) Oral every 6 hours PRN agitation  OLANZapine Injectable 5 milliGRAM(s) IntraMuscular every 6 hours PRN severe agitation      Allergies    No Known Allergies    Intolerances        SOCIAL HISTORY: lives at home with roommate    FAMILY HISTORY:  No pertinent family history in first degree relatives      Vital Signs Last 24 Hrs  T(C): 37.1 (13 Aug 2018 14:53), Max: 37.1 (13 Aug 2018 14:53)  T(F): 98.8 (13 Aug 2018 14:53), Max: 98.8 (13 Aug 2018 14:53)  HR: 59 (13 Aug 2018 14:53) (59 - 70)  BP: 129/86 (13 Aug 2018 14:53) (112/71 - 135/92)  BP(mean): --  RR: 18 (13 Aug 2018 14:53) (18 - 18)  SpO2: 100% (13 Aug 2018 14:53) (100% - 100%)    Physical Exam:  The patient was alert and oriented X 3, well nourished, and in no apparent distress. Oropharynx showed no ulcerations. There was no visible lymphadenopathy. Conjunctiva were non-injected. There was no clubbing or edema of extremities.    The scalp, hair, face, eyebrows, lips, oropharynx , neck, chest, back, buttocks, extremities X 4, hands, feet, nails were examined. There was no hyperhidrosis or bromhidrosis.     The following lesions are noted:   pink erythematous papules on back and abdomen, faint pink erythematous macules on arms    LABS:                        15.2   8.23  )-----------( 219      ( 13 Aug 2018 05:58 )             44.8     08-13    139  |  105  |  13  ----------------------------<  80  3.6   |  21<L>  |  0.87    Ca    8.7      13 Aug 2018 05:58  Phos  2.6     08-13  Mg     1.6     08-13    TPro  7.3  /  Alb  3.4  /  TBili  0.5  /  DBili  x   /  AST  19  /  ALT  7   /  AlkPhos  97  08-12          RADIOLOGY & ADDITIONAL STUDIES: no relevant studies

## 2018-08-14 ENCOUNTER — TRANSCRIPTION ENCOUNTER (OUTPATIENT)
Age: 58
End: 2018-08-14

## 2018-08-14 ENCOUNTER — INPATIENT (INPATIENT)
Facility: HOSPITAL | Age: 58
LOS: 34 days | Discharge: HOME | End: 2018-09-18
Attending: PSYCHIATRY & NEUROLOGY | Admitting: PSYCHIATRY & NEUROLOGY
Payer: MEDICAID

## 2018-08-14 VITALS
RESPIRATION RATE: 19 BRPM | HEART RATE: 80 BPM | DIASTOLIC BLOOD PRESSURE: 80 MMHG | OXYGEN SATURATION: 100 % | SYSTOLIC BLOOD PRESSURE: 126 MMHG | TEMPERATURE: 98 F

## 2018-08-14 VITALS — WEIGHT: 171.96 LBS | TEMPERATURE: 98 F | RESPIRATION RATE: 16 BRPM | HEIGHT: 68 IN

## 2018-08-14 DIAGNOSIS — F25.9 SCHIZOAFFECTIVE DISORDER, UNSPECIFIED: ICD-10-CM

## 2018-08-14 LAB
GLUCOSE BLDC GLUCOMTR-MCNC: 113 MG/DL — HIGH (ref 70–99)
GLUCOSE BLDC GLUCOMTR-MCNC: 85 MG/DL — SIGNIFICANT CHANGE UP (ref 70–99)

## 2018-08-14 PROCEDURE — 70450 CT HEAD/BRAIN W/O DYE: CPT | Mod: 26

## 2018-08-14 PROCEDURE — 99232 SBSQ HOSP IP/OBS MODERATE 35: CPT

## 2018-08-14 PROCEDURE — 99239 HOSP IP/OBS DSCHRG MGMT >30: CPT

## 2018-08-14 PROCEDURE — 90792 PSYCH DIAG EVAL W/MED SRVCS: CPT

## 2018-08-14 RX ORDER — DIPHENHYDRAMINE HCL 50 MG
50 CAPSULE ORAL ONCE
Qty: 0 | Refills: 0 | Status: DISCONTINUED | OUTPATIENT
Start: 2018-08-14 | End: 2018-09-18

## 2018-08-14 RX ORDER — CLOZAPINE 150 MG/1
50 TABLET, ORALLY DISINTEGRATING ORAL AT BEDTIME
Qty: 0 | Refills: 0 | Status: DISCONTINUED | OUTPATIENT
Start: 2018-08-14 | End: 2018-08-16

## 2018-08-14 RX ORDER — CLOZAPINE 150 MG/1
1 TABLET, ORALLY DISINTEGRATING ORAL
Qty: 0 | Refills: 0 | COMMUNITY
Start: 2018-08-14

## 2018-08-14 RX ORDER — PANTOPRAZOLE SODIUM 20 MG/1
40 TABLET, DELAYED RELEASE ORAL
Qty: 0 | Refills: 0 | Status: DISCONTINUED | OUTPATIENT
Start: 2018-08-14 | End: 2018-08-14

## 2018-08-14 RX ORDER — TAMSULOSIN HYDROCHLORIDE 0.4 MG/1
0.4 CAPSULE ORAL AT BEDTIME
Qty: 0 | Refills: 0 | Status: DISCONTINUED | OUTPATIENT
Start: 2018-08-14 | End: 2018-09-18

## 2018-08-14 RX ORDER — CHOLECALCIFEROL (VITAMIN D3) 125 MCG
1000 CAPSULE ORAL DAILY
Qty: 0 | Refills: 0 | Status: DISCONTINUED | OUTPATIENT
Start: 2018-08-14 | End: 2018-09-18

## 2018-08-14 RX ORDER — HALOPERIDOL DECANOATE 100 MG/ML
5 INJECTION INTRAMUSCULAR ONCE
Qty: 0 | Refills: 0 | Status: DISCONTINUED | OUTPATIENT
Start: 2018-08-14 | End: 2018-09-18

## 2018-08-14 RX ORDER — LEVOTHYROXINE SODIUM 125 MCG
50 TABLET ORAL DAILY
Qty: 0 | Refills: 0 | Status: DISCONTINUED | OUTPATIENT
Start: 2018-08-14 | End: 2018-09-18

## 2018-08-14 RX ORDER — DIPHENHYDRAMINE HCL 50 MG
50 CAPSULE ORAL EVERY 6 HOURS
Qty: 0 | Refills: 0 | Status: DISCONTINUED | OUTPATIENT
Start: 2018-08-14 | End: 2018-09-18

## 2018-08-14 RX ORDER — TAMSULOSIN HYDROCHLORIDE 0.4 MG/1
0.4 CAPSULE ORAL AT BEDTIME
Qty: 0 | Refills: 0 | Status: DISCONTINUED | OUTPATIENT
Start: 2018-08-14 | End: 2018-08-14

## 2018-08-14 RX ORDER — OLANZAPINE 15 MG/1
1 TABLET, FILM COATED ORAL
Qty: 0 | Refills: 0 | COMMUNITY
Start: 2018-08-14

## 2018-08-14 RX ORDER — PANTOPRAZOLE SODIUM 20 MG/1
40 TABLET, DELAYED RELEASE ORAL
Qty: 0 | Refills: 0 | Status: DISCONTINUED | OUTPATIENT
Start: 2018-08-14 | End: 2018-09-18

## 2018-08-14 RX ORDER — HALOPERIDOL DECANOATE 100 MG/ML
5 INJECTION INTRAMUSCULAR EVERY 6 HOURS
Qty: 0 | Refills: 0 | Status: DISCONTINUED | OUTPATIENT
Start: 2018-08-14 | End: 2018-09-18

## 2018-08-14 RX ORDER — PETROLATUM,WHITE
1 JELLY (GRAM) TOPICAL
Qty: 0 | Refills: 0 | Status: DISCONTINUED | OUTPATIENT
Start: 2018-08-14 | End: 2018-09-18

## 2018-08-14 RX ORDER — OLANZAPINE 15 MG/1
15 TABLET, FILM COATED ORAL AT BEDTIME
Qty: 0 | Refills: 0 | Status: DISCONTINUED | OUTPATIENT
Start: 2018-08-14 | End: 2018-08-18

## 2018-08-14 RX ORDER — DIVALPROEX SODIUM 500 MG/1
3 TABLET, DELAYED RELEASE ORAL
Qty: 0 | Refills: 0 | COMMUNITY

## 2018-08-14 RX ORDER — ACETAMINOPHEN 500 MG
650 TABLET ORAL EVERY 6 HOURS
Qty: 0 | Refills: 0 | Status: DISCONTINUED | OUTPATIENT
Start: 2018-08-14 | End: 2018-09-18

## 2018-08-14 RX ORDER — LEVOTHYROXINE SODIUM 125 MCG
50 TABLET ORAL DAILY
Qty: 0 | Refills: 0 | Status: DISCONTINUED | OUTPATIENT
Start: 2018-08-14 | End: 2018-08-14

## 2018-08-14 RX ORDER — PETROLATUM,WHITE
1 JELLY (GRAM) TOPICAL
Qty: 0 | Refills: 0 | COMMUNITY
Start: 2018-08-14

## 2018-08-14 RX ORDER — CHOLECALCIFEROL (VITAMIN D3) 125 MCG
1000 CAPSULE ORAL DAILY
Qty: 0 | Refills: 0 | Status: DISCONTINUED | OUTPATIENT
Start: 2018-08-14 | End: 2018-08-14

## 2018-08-14 RX ADMIN — CLOZAPINE 50 MILLIGRAM(S): 150 TABLET, ORALLY DISINTEGRATING ORAL at 21:15

## 2018-08-14 RX ADMIN — Medication 1000 UNIT(S): at 11:56

## 2018-08-14 RX ADMIN — Medication 1 APPLICATION(S): at 21:16

## 2018-08-14 RX ADMIN — PANTOPRAZOLE SODIUM 40 MILLIGRAM(S): 20 TABLET, DELAYED RELEASE ORAL at 11:56

## 2018-08-14 RX ADMIN — OLANZAPINE 15 MILLIGRAM(S): 15 TABLET, FILM COATED ORAL at 21:15

## 2018-08-14 RX ADMIN — TAMSULOSIN HYDROCHLORIDE 0.4 MILLIGRAM(S): 0.4 CAPSULE ORAL at 21:16

## 2018-08-14 NOTE — PROGRESS NOTE ADULT - PROBLEM SELECTOR PLAN 8
stated
No levothyroxine on psychiatry note listing current medications.  f/u FT4
monitor bp off meds
monitor bp off meds

## 2018-08-14 NOTE — DISCHARGE NOTE ADULT - CARE PLAN
Principal Discharge DX:	Rash  Goal:	continue with hygiene  Assessment and plan of treatment:	Evaluated by Dermatology who recommended using a bland emollient such as Vaseline twice a day to your body  Secondary Diagnosis:	Schizophrenia  Assessment and plan of treatment:	Continue medications regimen . Follow up with your PCP for further evaluation and management. Please call to make an appointment within 1-2 weeks of discharge.  Secondary Diagnosis:	Hypothyroidism  Assessment and plan of treatment:	Continue medications. Follow up with your PCP for further evaluation and management. Please call to make an appointment within 1-2 weeks of discharge.  Secondary Diagnosis:	Acute kidney failure  Goal:	resolved  Assessment and plan of treatment:	Stay hydrated with water, eat small frequent meals

## 2018-08-14 NOTE — DISCHARGE NOTE ADULT - PROVIDER TOKENS
FREE:[LAST:[[facility] providers],PHONE:[(   )    -],FAX:[(   )    -],ADDRESS:[providers at [facility]]]

## 2018-08-14 NOTE — PROGRESS NOTE BEHAVIORAL HEALTH - NSBHCONSULTMEDS_PSY_A_CORE FT
- increase clozapine to 50mg qHS tonight  - c/w zyprexa zydis 15mg qhs
start clozapine 2mg qhs, zyprexa zydis 15mg qhs
- clozapine 50mg qHS tonight  - c/w zyprexa zydis 15mg qhs

## 2018-08-14 NOTE — PROGRESS NOTE ADULT - PROBLEM SELECTOR PLAN 9
?d/t poor hygiene, contact isolation for now, will ask for derm to eval
No levothyroxine on psychiatry note listing current medications.  f/u FT4
No levothyroxine on psychiatry note listing current medications.  f/u FT4

## 2018-08-14 NOTE — DISCHARGE NOTE ADULT - HOSPITAL COURSE
58 M PMH schizoaffective disorder, essential HTN, hypothyroidism presents after being found on couch having defecated and urinated on himself. A/w dehydration, hyponatremia, SANDIE, 58 M PMH schizoaffective disorder, essential HTN, hypothyroidism presents after being found on couch having defecated and urinated on himself. A/w dehydration, hyponatremia, SANDIE.   Inability to self-care likely 2/2 psychiatric disease and medication non-adherence.   - appreciate psych input --> - increase clozapine to 50mg qHS tonight  - c/w zyprexa zydis 15mg qhs  psych recommending H admission      SANDIE 2/2 reduced oral intake and pre-renal insult --> improved with IVFs, encourage PO.     Rash- derm consulted likely dermatis recommend Vaseline bid to whole body

## 2018-08-14 NOTE — PROGRESS NOTE BEHAVIORAL HEALTH - NSBHCONSULTFOLLOWDETAILS_PSY_A_CORE FT
pt requires inpatient hospitalization, 2PC in chart
pt will likely require inpatient hospitalization
pt requires inpatient hospitalization, 2PC in chart

## 2018-08-14 NOTE — PROGRESS NOTE BEHAVIORAL HEALTH - CASE SUMMARY
58 M PMH schizoaffective disorder, essential HTN, hypothyroidism (all per chart) presents after being found on couch having defecated and urinated on himself.   Per ED charting, the patient was released from [facility] about 2 weeks ago and had been living with a roommate. The patient was unable to care for himself and was not eating and drinking and  had urinated and defecated on himself. He was found by his roommate who contacted EMS. The patient has no recollection of this and states he lives with his fiancee. He presented to the ED malodorous and had to be bathed. He will be restarted on Clozapine and Zyprexa.
2pc in chart, patient acutely psychotically decompensated. Signout provided to [employer].

## 2018-08-14 NOTE — PROGRESS NOTE BEHAVIORAL HEALTH - NSBHFUPINTERVALHXFT_PSY_A_CORE
Pt did not require any PRN medication overnight. Says EMS came to pick him up to bring him to the hospital for unknown blood work. Cooperative with staff when trying to walk him. Reports talking to Shanel (JAYLIN of a fiance) multiple times per day. Reports being treated well and feeling safe in the hospital. No agitation. Denies depressive sx or SI. Pt was released from [place] by  and has been off meds since.
No PRN medication for agitation overnight. Cooperative, answers questions but is incorrect and concrete. AOx2. States "I want to be admitted" because he is "allergic to all the medications".  No understanding of his level of decompensation which resulted in admission.
No PRN medication for agitation overnight. Cooperative, answers questions but is incorrect and concrete. AOx2. States "I want to be admitted" because he is "allergic to all the medications". Endorses referential thoughts from TV. Seen eating food, randomly doing pushups in the room. No understanding of his level of decompensation which resulted in admission.

## 2018-08-14 NOTE — DISCHARGE NOTE ADULT - PLAN OF CARE
continue with hygiene Evaluated by Dermatology who recommended using a bland emollient such as Vaseline twice a day to your body Continue medications regimen . Follow up with your PCP for further evaluation and management. Please call to make an appointment within 1-2 weeks of discharge. Continue medications. Follow up with your PCP for further evaluation and management. Please call to make an appointment within 1-2 weeks of discharge. resolved Stay hydrated with water, eat small frequent meals

## 2018-08-14 NOTE — PROGRESS NOTE ADULT - PROBLEM SELECTOR PLAN 4
?d/t poor hygiene, contact isolation for now, will ask for derm to eval
s/p repletion, improved, f/u
?d/t poor hygiene, contact isolation for now, will ask for derm to eval

## 2018-08-14 NOTE — PROGRESS NOTE BEHAVIORAL HEALTH - NSBHATTESTSEENBY_PSY_A_CORE
attending Psychiatrist without NP/Trainee
Attending Psychiatrist supervising NP/Trainee, meeting pt...
Attending Psychiatrist supervising NP/Trainee, meeting pt...

## 2018-08-14 NOTE — PROGRESS NOTE ADULT - PROBLEM SELECTOR PLAN 3
Hyponatremia likely secondary to reduced oral intake/dehydration --> improving with IVFs, f/u
Patient with h/o schizophrenia vs schizoaffective disorder; recently discharged from [place] by court order. Now found to be covered in feces and urine because he has not left sofa for 2 weeks. Inability to self-care likely 2/2 psychiatric disease and medication non-adherence. Psychiatry recommendations reviewed.  -resume olanzapine 15mg PO hs  -olanzapine 10mg IM prn severe agitation  -f/u psychiatry C&L recommendations  -social work c/s
Hyponatremia likely secondary to reduced oral intake/dehydration --> improving with IVFs, f/u

## 2018-08-14 NOTE — PROGRESS NOTE ADULT - PROBLEM SELECTOR PLAN 1
Patient with h/o schizophrenia vs schizoaffective disorder; recently discharged from [place] by court order. Now found to be covered in feces and urine because he has not left sofa for 2 weeks. Inability to self-care likely 2/2 psychiatric disease and medication non-adherence.   - appreciate psych input --> - increase clozapine to 50mg qHS tonight  - c/w zyprexa zydis 15mg qhs  - PRN Zyprexa PO/IM  - to be discharged to [facility]
SANDIE 2/2 reduced oral intake and pre-renal insult --> improving, change to LR, encourage PO
Patient with h/o schizophrenia vs schizoaffective disorder; recently discharged from [place] by court order. Found to be covered in feces and urine because he has not left sofa for 2 weeks. Inability to self-care likely 2/2 psychiatric disease and medication non-adherence.   - appreciate psych input --> - increase clozapine to 50mg qHS tonight  - c/w zyprexa zydis 15mg qhs  - PRN Zyprexa PO/IM  - to be transferred to [facility]

## 2018-08-14 NOTE — PROGRESS NOTE ADULT - PROBLEM SELECTOR PLAN 7
Patient does not know what medications he takes or what pharmacy he uses. He likely has not taken medications since discharge from [place]    Per psychiatry note, patient's current medication list includes:  Clozapine 350 hs, Depakote 1250 daily, Flomax 0.4mg hs, Bentyl 10mg qid, Atenolol 50mg daily, Xarelto 10mg for dvt ppx.    No urgent indication to continue these (pscyhotropic medications are being dosed as above). DVT prophylaxis in absence of known h/o clot or pro-thrombotic arrythmia is not an indication for DOAC use. Will provide chemical DVT ppx with HSQ instead.    Pharmacy team consulted for assistance with determining patient's medication regimen.
monitor bp off meds
Patient does not know what medications he takes or what pharmacy he uses. He likely has not taken medications since discharge from [place]    Per psychiatry note, patient's current medication list includes:  Clozapine 350 hs, Depakote 1250 daily, Flomax 0.4mg hs, Bentyl 10mg qid, Atenolol 50mg daily, Xarelto 10mg for dvt ppx.    No urgent indication to continue these (pscyhotropic medications are being dosed as above). DVT prophylaxis in absence of known h/o clot or pro-thrombotic arrythmia is not an indication for DOAC use. Will provide chemical DVT ppx with HSQ instead.    Pharmacy team consulted for assistance with determining patient's medication regimen.

## 2018-08-14 NOTE — PROGRESS NOTE ADULT - ASSESSMENT
58 M PMH schizoaffective disorder, essential HTN, hypothyroidism (all per chart) presents after being found on couch having defecated and urinated on himself. A/w dehydration, hyponatremia, SANDIE,
58 M PMH schizoaffective disorder, essential HTN, hypothyroidism presents after being found on couch having defecated and urinated on himself. A/w dehydration, hyponatremia, SANDIE,
58 M PMH schizoaffective disorder, essential HTN, hypothyroidism presents after being found on couch having defecated and urinated on himself. A/w dehydration, hyponatremia, SANDIE,

## 2018-08-14 NOTE — PROGRESS NOTE ADULT - SUBJECTIVE AND OBJECTIVE BOX
CC: psychosis    SUBJECTIVE / OVERNIGHT EVENTS:  No problems reported.  Pt lying in bed all day, using urinal.  Says he just washed up but still with strong odor of urine, agrees to staff helping wash him.  Denies pain, SOB.  Feels safe here.      MEDICATIONS  (STANDING):  cholecalciferol 1000 Unit(s) Oral daily  cloZAPine 50 milliGRAM(s) Oral <User Schedule>  heparin  Injectable 5000 Unit(s) SubCutaneous every 8 hours  levothyroxine 50 MICROGram(s) Oral daily  OLANZapine Disintegrating Tablet 15 milliGRAM(s) Oral <User Schedule>  pantoprazole    Tablet 40 milliGRAM(s) Oral before breakfast  petrolatum white Ointment 1 Application(s) Topical two times a day  tamsulosin 0.4 milliGRAM(s) Oral at bedtime    MEDICATIONS  (PRN):  OLANZapine Disintegrating Tablet 5 milliGRAM(s) Oral every 6 hours PRN agitation  OLANZapine Injectable 5 milliGRAM(s) IntraMuscular every 6 hours PRN severe agitation    CAPILLARY BLOOD GLUCOSE  POCT Blood Glucose.: 85 mg/dL (14 Aug 2018 07:43)  POCT Blood Glucose.: 101 mg/dL (13 Aug 2018 22:22)  POCT Blood Glucose.: 179 mg/dL (13 Aug 2018 16:54)  POCT Blood Glucose.: 87 mg/dL (13 Aug 2018 11:57)    Vital Signs Last 24 Hrs  T(C): 36.9 (14 Aug 2018 05:20), Max: 37.1 (13 Aug 2018 14:53)  T(F): 98.4 (14 Aug 2018 05:20), Max: 98.8 (13 Aug 2018 14:53)  HR: 62 (14 Aug 2018 05:20) (59 - 63)  BP: 132/86 (14 Aug 2018 05:20) (129/86 - 132/86)  RR: 18 (14 Aug 2018 05:20) (18 - 18)  SpO2: 99% (14 Aug 2018 05:20) (99% - 100%)        PHYSICAL EXAM:  GENERAL: NAD, well-developed  HEAD:  Atraumatic, Normocephalic  EYES: EOMI, PERRLA, conjunctiva and sclera clear  NECK: Supple, No JVD  CHEST/LUNG: Clear to auscultation bilaterally; No wheeze  HEART: Regular rate and rhythm; No murmurs, rubs, or gallops  ABDOMEN: Soft, Nontender, Nondistended; Bowel sounds present  EXTREMITIES:  2+ Peripheral Pulses, No clubbing, cyanosis, or edema  PSYCH: AAOx3  NEUROLOGY: non-focal  SKIN: No rashes or lesions    LABS:             15.2   8.23  )-----------( 219      ( 13 Aug 2018 05:58 )             44.8     139  |  105  |  13  ----------------------------<  80  3.6   |  21<L>  |  0.87    Ca    8.7      13 Aug 2018 05:58  Phos  2.6     08-13  Mg     1.6     08-13    RADIOLOGY & ADDITIONAL TESTS:    Imaging Personally Reviewed:    Consultant(s) Notes Reviewed:      Care Discussed with Consultants/Other Providers: RN, SW, CM, ADS, psychiatry re overall care CC: psychosis    SUBJECTIVE / OVERNIGHT EVENTS:  No problems reported.  Pt lying in bed all day, using urinal.  Says he just washed up but still with strong odor of urine, agrees to staff helping wash him.  Denies pain, SOB.  Feels safe here.      MEDICATIONS  (STANDING):  cholecalciferol 1000 Unit(s) Oral daily  cloZAPine 50 milliGRAM(s) Oral <User Schedule>  heparin  Injectable 5000 Unit(s) SubCutaneous every 8 hours  levothyroxine 50 MICROGram(s) Oral daily  OLANZapine Disintegrating Tablet 15 milliGRAM(s) Oral <User Schedule>  pantoprazole    Tablet 40 milliGRAM(s) Oral before breakfast  petrolatum white Ointment 1 Application(s) Topical two times a day  tamsulosin 0.4 milliGRAM(s) Oral at bedtime    MEDICATIONS  (PRN):  OLANZapine Disintegrating Tablet 5 milliGRAM(s) Oral every 6 hours PRN agitation  OLANZapine Injectable 5 milliGRAM(s) IntraMuscular every 6 hours PRN severe agitation    CAPILLARY BLOOD GLUCOSE  POCT Blood Glucose.: 85 mg/dL (14 Aug 2018 07:43)  POCT Blood Glucose.: 101 mg/dL (13 Aug 2018 22:22)  POCT Blood Glucose.: 179 mg/dL (13 Aug 2018 16:54)  POCT Blood Glucose.: 87 mg/dL (13 Aug 2018 11:57)    Vital Signs Last 24 Hrs  T(C): 36.9 (14 Aug 2018 05:20), Max: 37.1 (13 Aug 2018 14:53)  T(F): 98.4 (14 Aug 2018 05:20), Max: 98.8 (13 Aug 2018 14:53)  HR: 62 (14 Aug 2018 05:20) (59 - 63)  BP: 132/86 (14 Aug 2018 05:20) (129/86 - 132/86)  RR: 18 (14 Aug 2018 05:20) (18 - 18)  SpO2: 99% (14 Aug 2018 05:20) (99% - 100%)    PHYSICAL EXAM:  GENERAL: Thin man with unruly beard, lying in bed, in NAD, unkempt, strong odor of urine  HEAD:  Atraumatic, Normocephalic; ecchymosis forehead  EYES: EOMI, PERRLA, conjunctiva and sclera clear  NECK: Supple, No elevated JVD  CHEST/LUNG: Clear to auscultation bilaterally; No wheeze  HEART: Regular rate and rhythm; No murmurs, rubs, or gallops  ABDOMEN: Soft, Nontender, Nondistended; Bowel sounds present  EXTREMITIES:  2+ Peripheral Pulses, No clubbing, cyanosis, or edema  PSYCH: superficially cooperative, oriented August 12, 2018, [place]  NEUROLOGY: CN II-XII grossly intact, moving all extremities  SKIN:  macular rash fading    LABS:             15.2   8.23  )-----------( 219      ( 13 Aug 2018 05:58 )             44.8     139  |  105  |  13  ----------------------------<  80  3.6   |  21<L>  |  0.87    Ca    8.7      13 Aug 2018 05:58  Phos  2.6     08-13  Mg     1.6     08-13    RADIOLOGY & ADDITIONAL TESTS:    Imaging Personally Reviewed:    Consultant(s) Notes Reviewed:      Care Discussed with Consultants/Other Providers: RN, SW, CM, ADS, psychiatry re overall care

## 2018-08-14 NOTE — PROGRESS NOTE ADULT - ATTENDING COMMENTS
pt stable for transfer to [facility], d/w hospitalist  Spent 35 minutes counseling and coordinating discharge care.

## 2018-08-14 NOTE — PROGRESS NOTE BEHAVIORAL HEALTH - RISK ASSESSMENT
currently at elevated risk to self as he is unable to meet his basic needs in the community in his current state which includes not taking any medications (both medical and psychiatric), not able to perform basic tasks of relieving himself in a bathroom - instead of soiling himself with both urine and feces while sitting in one place, as well as psychotic sx, AH.

## 2018-08-14 NOTE — PROGRESS NOTE ADULT - PROBLEM SELECTOR PLAN 2
Hyponatremia likely secondary to reduced oral intake/dehydration --> improving with IVFs, f/u
SANDIE 2/2 reduced oral intake and pre-renal insult --> improved with IVFs, encourage PO
SANDIE 2/2 reduced oral intake and pre-renal insult --> improved with IVFs, encourage PO

## 2018-08-14 NOTE — PROGRESS NOTE BEHAVIORAL HEALTH - SUMMARY
58  Male, PPH of schizoaffective disorder, PPH of essential HTN, hypothyroidism. Patient "won" his case in Court about 3 weeks ago and was released by the  from Mental [address] Court after [facility] did not win its motion for Medication Over Objection / Order of Retention. He has not taken any medications or seen a psychiatrist since that time. Patient was BIB from home by EMS covered in feces and urine, reportedly not functioning and sitting in one place for about 2 weeks. Pt has been off medication and has poor self care/self-neglect resulting in acute dehydration, hyponatremia, hypokalemia.     Patient would benefit from inpatient psychiatric care for stabilization. Pt should be medically optimized before psychiatric admission.
Patient is well known to psychiatric services including outpatient treatment from 1997 - initial start with depression and Personality Disorder NOS with mother's death being a major turning point in his life after which time Patient started to exhibit psychotic symptoms, paranoid delusions as well as poor self care (ie. living in squalor, urine and feces covering home and Patient) which have been chronic for years who actually "won" his case in Court about 3 weeks ago and was released by the  from Mental Hygiene Court after [facility] did not win its motion for Medication Over Objection / Order of Retention. He has not taken any medications or seen a psychiatrist since that time. Patient was BIB from home by EMS covered in feces and urine, reportedly not functioning and sitting in one place for about 2 weeks. Unclear who called 911 (EMS report stated a roommate and a health care worker but did not write down their names/phone #) hence no collateral available. Pt has been off medication and has poor self care/self-neglect. Patient would benefit from inpatient psychiatric care for stabilization. Pt should be medically optimized before psychiatric admission.
58  Male, PPH of schizoaffective disorder, PPH of essential HTN, hypothyroidism. Patient "won" his case in Court about 3 weeks ago and was released by the  from Mental Hygiene Court after [facility] did not win its motion for Medication Over Objection / Order of Retention. He has not taken any medications or seen a psychiatrist since that time. Patient was BIB from home by EMS covered in feces and urine, reportedly not functioning and sitting in one place for about 2 weeks. Pt has been off medication and has poor self care/self-neglect resulting in acute dehydration, hyponatremia, hypokalemia.     Patient would benefit from inpatient psychiatric care for stabilization. Pt should be medically optimized before psychiatric admission.

## 2018-08-14 NOTE — DISCHARGE NOTE ADULT - CARE PROVIDER_API CALL
[facility] providers,   providers at [facility]  Phone: (   )    -  Fax: (   )    -

## 2018-08-14 NOTE — DISCHARGE NOTE ADULT - MEDICATION SUMMARY - MEDICATIONS TO TAKE
I will START or STAY ON the medications listed below when I get home from the hospital:    acetaminophen 325 mg oral tablet  -- 2 tab(s) by mouth 2 times a day, As Needed  -- Indication: For Prophylactic measure    tamsulosin 0.4 mg oral capsule  -- 1 cap(s) by mouth once a day (at bedtime)  -- Indication: For Prophylactic measure    cloZAPine 50 mg oral tablet  -- 1 tab(s) by mouth   -- Indication: For Schizophrenia    OLANZapine 15 mg oral tablet, disintegrating  -- 1 tab(s) by mouth   -- Indication: For Schizophrenia    alendronate 70 mg oral tablet  -- 1 tab(s) by mouth once a week on Wednesdays  -- Indication: For Prophylactic measure    petrolatum topical ointment  -- 1 application on skin 2 times a day  -- Indication: For Rash    omeprazole 20 mg oral delayed release capsule  -- 1 cap(s) by mouth once a day  -- Indication: For Prophylactic measure    levothyroxine 50 mcg (0.05 mg) oral tablet  -- 1 tab(s) by mouth once a day  -- Indication: For Hypothyroidism    Vitamin D3 1000 intl units oral tablet  -- 1 tab(s) by mouth once a day  -- Indication: For Prophylactic measure

## 2018-08-14 NOTE — PROGRESS NOTE BEHAVIORAL HEALTH - NSBHCHARTREVIEWVS_PSY_A_CORE FT
Vital Signs Last 24 Hrs  T(C): 36.7 (11 Aug 2018 08:35), Max: 36.9 (10 Aug 2018 15:16)  T(F): 98 (11 Aug 2018 08:35), Max: 98.5 (10 Aug 2018 15:16)  HR: 60 (11 Aug 2018 08:35) (60 - 92)  BP: 126/92 (11 Aug 2018 08:35) (126/92 - 136/80)  BP(mean): 106 (10 Aug 2018 21:00) (106 - 106)  RR: 18 (11 Aug 2018 08:35) (14 - 18)  SpO2: 100% (11 Aug 2018 08:35) (98% - 100%)
Vital Signs Last 24 Hrs  T(C): 37.1 (13 Aug 2018 14:53), Max: 37.1 (13 Aug 2018 14:53)  T(F): 98.8 (13 Aug 2018 14:53), Max: 98.8 (13 Aug 2018 14:53)  HR: 59 (13 Aug 2018 14:53) (59 - 70)  BP: 129/86 (13 Aug 2018 14:53) (112/71 - 135/92)  BP(mean): --  RR: 18 (13 Aug 2018 14:53) (18 - 18)  SpO2: 100% (13 Aug 2018 14:53) (100% - 100%)
Vital Signs Last 24 Hrs  T(C): 36.7 (14 Aug 2018 14:30), Max: 37.1 (13 Aug 2018 14:53)  T(F): 98.1 (14 Aug 2018 14:30), Max: 98.8 (13 Aug 2018 14:53)  HR: 80 (14 Aug 2018 14:30) (59 - 80)  BP: 126/80 (14 Aug 2018 14:30) (126/80 - 132/86)  BP(mean): --  RR: 19 (14 Aug 2018 14:30) (18 - 19)  SpO2: 100% (14 Aug 2018 14:30) (99% - 100%)

## 2018-08-14 NOTE — PROGRESS NOTE BEHAVIORAL HEALTH - ORIENTATION OTHER
Patient knew when he was discharged from [place], recalled the name of the , correctly listed his medical history
Patient knew when he was discharged from [place], recalled the name of the , correctly listed his medical history

## 2018-08-14 NOTE — PROGRESS NOTE BEHAVIORAL HEALTH - NSBHFUPINTERVALCCFT_PSY_A_CORE
psychosis, in ability to care for self

## 2018-08-14 NOTE — PROGRESS NOTE ADULT - PROBLEM SELECTOR PLAN 6
Patient does not know what medications he takes or what pharmacy he uses. He likely has not taken medications since discharge from [place]    Per psychiatry note, patient's current medication list includes:  Clozapine 350 hs, Depakote 1250 daily, Flomax 0.4mg hs, Bentyl 10mg qid, Atenolol 50mg daily, Xarelto 10mg for dvt ppx.    No urgent indication to continue these (pscyhotropic medications are being dosed as above). DVT prophylaxis in absence of known h/o clot or pro-thrombotic arrythmia is not an indication for DOAC use. Will provide chemical DVT ppx with HSQ instead.    Pharmacy team consulted for assistance with determining patient's medication regimen.
improved, f/u
improved, f/u

## 2018-08-14 NOTE — PROGRESS NOTE BEHAVIORAL HEALTH - AXIS III
bph, htn, arthritis; fibromyalgia

## 2018-08-14 NOTE — PROGRESS NOTE BEHAVIORAL HEALTH - NSBHCHARTREVIEWLAB_PSY_A_CORE FT
16.2   9.67  )-----------( 235      ( 10 Aug 2018 19:23 )             45.5   08-10    130<L>  |  94<L>  |  32<H>  ----------------------------<  111<H>  3.2<L>   |  20<L>  |  1.43<H>    Ca    7.6<L>      10 Aug 2018 19:23    TPro  9.1<H>  /  Alb  4.3  /  TBili  1.0  /  DBili  x   /  AST  17  /  ALT  8   /  AlkPhos  117  08-10
15.2   8.23  )-----------( 219      ( 13 Aug 2018 05:58 )             44.8   08-13    139  |  105  |  13  ----------------------------<  80  3.6   |  21<L>  |  0.87    Ca    8.7      13 Aug 2018 05:58  Phos  2.6     08-13  Mg     1.6     08-13    TPro  7.3  /  Alb  3.4  /  TBili  0.5  /  DBili  x   /  AST  19  /  ALT  7   /  AlkPhos  97  08-12
08-13    139  |  105  |  13  ----------------------------<  80  3.6   |  21<L>  |  0.87    Ca    8.7      13 Aug 2018 05:58  Phos  2.6     08-13  Mg     1.6     08-13                            15.2   8.23  )-----------( 219      ( 13 Aug 2018 05:58 )             44.8

## 2018-08-15 DIAGNOSIS — R73.9 HYPERGLYCEMIA, UNSPECIFIED: ICD-10-CM

## 2018-08-15 PROCEDURE — 99223 1ST HOSP IP/OBS HIGH 75: CPT

## 2018-08-15 PROCEDURE — 99232 SBSQ HOSP IP/OBS MODERATE 35: CPT

## 2018-08-15 RX ORDER — AMLODIPINE BESYLATE 2.5 MG/1
5 TABLET ORAL DAILY
Qty: 0 | Refills: 0 | Status: DISCONTINUED | OUTPATIENT
Start: 2018-08-15 | End: 2018-09-10

## 2018-08-15 RX ADMIN — TAMSULOSIN HYDROCHLORIDE 0.4 MILLIGRAM(S): 0.4 CAPSULE ORAL at 21:00

## 2018-08-15 RX ADMIN — CLOZAPINE 50 MILLIGRAM(S): 150 TABLET, ORALLY DISINTEGRATING ORAL at 21:00

## 2018-08-15 RX ADMIN — AMLODIPINE BESYLATE 5 MILLIGRAM(S): 2.5 TABLET ORAL at 13:42

## 2018-08-15 RX ADMIN — Medication 50 MICROGRAM(S): at 08:44

## 2018-08-15 RX ADMIN — Medication 1 APPLICATION(S): at 08:44

## 2018-08-15 RX ADMIN — OLANZAPINE 15 MILLIGRAM(S): 15 TABLET, FILM COATED ORAL at 21:00

## 2018-08-15 RX ADMIN — Medication 1000 UNIT(S): at 08:44

## 2018-08-15 RX ADMIN — PANTOPRAZOLE SODIUM 40 MILLIGRAM(S): 20 TABLET, DELAYED RELEASE ORAL at 08:44

## 2018-08-15 RX ADMIN — Medication 1 APPLICATION(S): at 21:00

## 2018-08-15 NOTE — CONSULT NOTE ADULT - SUBJECTIVE AND OBJECTIVE BOX
cc: schizoaffective    HPI: hospital course - 58 M PMH schizoaffective disorder, essential HTN, hypothyroidism presents after being found on couch having defecated and urinated on himself. A/w dehydration, hyponatremia, SANDIE. Inability to self-care likely 2/2 psychiatric disease and medication non-adherence. clozapine increased to 50mg qHS, c/w Zyprexa Zydis 15mg qhs. psych recommending [facility]. admission. SANDIE 2/2 reduced oral intake and pre-renal insult --> improved with IVFs, encourage PO. Rash- derm consulted likely dermatis recommend Vaseline bid. no evnets or complaints at [facility].     Allergies: NKDA    PMHx: per HPI  FHX: NC  Scoial HX: NC    ROS:  No HA/DZ  No Vision changes   No CP, SOB  No N/V/D  No Edema  No Rash  NO weakness, numbness    MEDICATIONS  (STANDING):  cholecalciferol 1000 Unit(s) Oral daily  cloZAPine 50 milliGRAM(s) Oral at bedtime  levothyroxine 50 MICROGram(s) Oral daily  OLANZapine Disintegrating Tablet 15 milliGRAM(s) Oral at bedtime  pantoprazole    Tablet 40 milliGRAM(s) Oral before breakfast  petrolatum white Ointment 1 Application(s) Topical two times a day  tamsulosin 0.4 milliGRAM(s) Oral at bedtime    MEDICATIONS  (PRN):  acetaminophen   Tablet. 650 milliGRAM(s) Oral every 6 hours PRN pain  diphenhydrAMINE   Capsule 50 milliGRAM(s) Oral every 6 hours PRN EPS  diphenhydrAMINE   Injectable 50 milliGRAM(s) IntraMuscular once PRN EPS  haloperidol     Tablet 5 milliGRAM(s) Oral every 6 hours PRN agitation  haloperidol    Injectable 5 milliGRAM(s) IntraMuscular once PRN Combative behavior  LORazepam     Tablet 2 milliGRAM(s) Oral every 6 hours PRN Anxiety  LORazepam   Injectable 2 milliGRAM(s) IntraMuscular once PRN Combative behavior      T(C): 36.6 (08-15-18 @ 06:29)  HR: 80 (08-14-18 @ 14:30)  BP: 126/80 (08-14-18 @ 14:30)  RR: 16 (08-14-18 @ 15:30)  SpO2: 100% (08-14-18 @ 14:30)  CAPILLARY BLOOD GLUCOSE        I&O's Summary      PHYSICAL EXAM:  GENERAL: NAD, unkempt  HEAD:  Atraumatic, Normocephalic  EYES: EOMI, PERRL, conjunctiva and sclera clear  NECK: Supple, No JVD  CHEST/LUNG: Clear to auscultation bilaterally  HEART: Regular rate and rhythm; No murmurs, rubs, or gallops, No Edema  ABDOMEN: Soft, Nontender, Nondistended; Bowel sounds present  EXTREMITIES:  2+ Peripheral Pulses, No clubbing, cyanosis  PSYCH: No SI/HI  NEUROLOGY: non-focal  SKIN: Macular rash LE    LABS:      FT4: 1.41          RADIOLOGY & ADDITIONAL TESTS: EKG personally reviwed - NRS - QRS widening     Imaging Personally Reviewed: CTH : neg    Consultant(s) Notes Reviewed:      Care Discussed with Consultants/Other Providers: Psych - Dr Robins

## 2018-08-16 PROCEDURE — 99233 SBSQ HOSP IP/OBS HIGH 50: CPT

## 2018-08-16 PROCEDURE — 99232 SBSQ HOSP IP/OBS MODERATE 35: CPT

## 2018-08-16 RX ORDER — CLOZAPINE 150 MG/1
75 TABLET, ORALLY DISINTEGRATING ORAL AT BEDTIME
Qty: 0 | Refills: 0 | Status: DISCONTINUED | OUTPATIENT
Start: 2018-08-16 | End: 2018-08-18

## 2018-08-16 RX ADMIN — OLANZAPINE 15 MILLIGRAM(S): 15 TABLET, FILM COATED ORAL at 20:52

## 2018-08-16 RX ADMIN — PANTOPRAZOLE SODIUM 40 MILLIGRAM(S): 20 TABLET, DELAYED RELEASE ORAL at 08:27

## 2018-08-16 RX ADMIN — Medication 1 APPLICATION(S): at 08:27

## 2018-08-16 RX ADMIN — AMLODIPINE BESYLATE 5 MILLIGRAM(S): 2.5 TABLET ORAL at 08:27

## 2018-08-16 RX ADMIN — Medication 1 APPLICATION(S): at 20:52

## 2018-08-16 RX ADMIN — TAMSULOSIN HYDROCHLORIDE 0.4 MILLIGRAM(S): 0.4 CAPSULE ORAL at 20:52

## 2018-08-16 RX ADMIN — Medication 1000 UNIT(S): at 08:27

## 2018-08-16 RX ADMIN — CLOZAPINE 75 MILLIGRAM(S): 150 TABLET, ORALLY DISINTEGRATING ORAL at 20:52

## 2018-08-16 RX ADMIN — Medication 50 MICROGRAM(S): at 08:27

## 2018-08-16 NOTE — PROGRESS NOTE ADULT - SUBJECTIVE AND OBJECTIVE BOX
Patient is a 58y old  Male who presents with a chief complaint of HTN    SUBJECTIVE / OVERNIGHT EVENTS: refused all labs and meds    ROS:  No HA/DZ  No Vision changes   No CP, SOB  No N/V/D  No Edema  No Rash  NO weakness, numbness    MEDICATIONS  (STANDING):  amLODIPine   Tablet 5 milliGRAM(s) Oral daily  cholecalciferol 1000 Unit(s) Oral daily  cloZAPine 50 milliGRAM(s) Oral at bedtime  levothyroxine 50 MICROGram(s) Oral daily  OLANZapine Disintegrating Tablet 15 milliGRAM(s) Oral at bedtime  pantoprazole    Tablet 40 milliGRAM(s) Oral before breakfast  petrolatum white Ointment 1 Application(s) Topical two times a day  tamsulosin 0.4 milliGRAM(s) Oral at bedtime    MEDICATIONS  (PRN):  acetaminophen   Tablet. 650 milliGRAM(s) Oral every 6 hours PRN pain  diphenhydrAMINE   Capsule 50 milliGRAM(s) Oral every 6 hours PRN EPS  diphenhydrAMINE   Injectable 50 milliGRAM(s) IntraMuscular once PRN EPS  haloperidol     Tablet 5 milliGRAM(s) Oral every 6 hours PRN agitation  haloperidol    Injectable 5 milliGRAM(s) IntraMuscular once PRN Combative behavior  LORazepam     Tablet 2 milliGRAM(s) Oral every 6 hours PRN Anxiety  LORazepam   Injectable 2 milliGRAM(s) IntraMuscular once PRN Combative behavior      T(C): 36.7 (08-15-18 @ 19:19)  HR: 70 (08-15-18 @ 19:19)  BP: 130/90 (08-15-18 @ 19:19)  RR: --12  SpO2: --  CAPILLARY BLOOD GLUCOSE        I&O's Summary      PHYSICAL EXAM:  GENERAL: NAD, unkempt   HEAD:  Atraumatic, Normocephalic  EYES: EOMI, PERRL, conjunctiva and sclera clear  NECK: Supple, No JVD  CHEST/LUNG: Clear to auscultation bilaterally  HEART: Regular rate and rhythm; No murmurs, rubs, or gallops, No Edema  ABDOMEN: Soft, Nontender, Nondistended; Bowel sounds present  EXTREMITIES:  2+ Peripheral Pulses, No clubbing, cyanosis  PSYCH: flat affect   NEUROLOGY: non-focal  SKIN: No rashes or lesions    LABS:                        RADIOLOGY & ADDITIONAL TESTS:    Imaging Personally Reviewed:    Consultant(s) Notes Reviewed:      Care Discussed with Consultants/Other Providers: Psych - Dr Robins

## 2018-08-17 PROCEDURE — 99232 SBSQ HOSP IP/OBS MODERATE 35: CPT

## 2018-08-17 RX ADMIN — Medication 50 MICROGRAM(S): at 09:38

## 2018-08-17 RX ADMIN — Medication 1 APPLICATION(S): at 09:38

## 2018-08-17 RX ADMIN — PANTOPRAZOLE SODIUM 40 MILLIGRAM(S): 20 TABLET, DELAYED RELEASE ORAL at 09:38

## 2018-08-17 RX ADMIN — OLANZAPINE 15 MILLIGRAM(S): 15 TABLET, FILM COATED ORAL at 21:33

## 2018-08-17 RX ADMIN — Medication 1 APPLICATION(S): at 21:33

## 2018-08-17 RX ADMIN — AMLODIPINE BESYLATE 5 MILLIGRAM(S): 2.5 TABLET ORAL at 09:38

## 2018-08-17 RX ADMIN — TAMSULOSIN HYDROCHLORIDE 0.4 MILLIGRAM(S): 0.4 CAPSULE ORAL at 21:33

## 2018-08-17 RX ADMIN — CLOZAPINE 75 MILLIGRAM(S): 150 TABLET, ORALLY DISINTEGRATING ORAL at 21:33

## 2018-08-17 RX ADMIN — Medication 1000 UNIT(S): at 09:38

## 2018-08-18 PROCEDURE — 99232 SBSQ HOSP IP/OBS MODERATE 35: CPT

## 2018-08-18 RX ORDER — OLANZAPINE 15 MG/1
10 TABLET, FILM COATED ORAL AT BEDTIME
Qty: 0 | Refills: 0 | Status: DISCONTINUED | OUTPATIENT
Start: 2018-08-18 | End: 2018-09-18

## 2018-08-18 RX ORDER — CLOZAPINE 150 MG/1
100 TABLET, ORALLY DISINTEGRATING ORAL AT BEDTIME
Qty: 0 | Refills: 0 | Status: DISCONTINUED | OUTPATIENT
Start: 2018-08-18 | End: 2018-08-21

## 2018-08-18 RX ADMIN — CLOZAPINE 100 MILLIGRAM(S): 150 TABLET, ORALLY DISINTEGRATING ORAL at 20:33

## 2018-08-18 RX ADMIN — Medication 1000 UNIT(S): at 08:49

## 2018-08-18 RX ADMIN — Medication 1 APPLICATION(S): at 20:33

## 2018-08-18 RX ADMIN — AMLODIPINE BESYLATE 5 MILLIGRAM(S): 2.5 TABLET ORAL at 08:49

## 2018-08-18 RX ADMIN — OLANZAPINE 10 MILLIGRAM(S): 15 TABLET, FILM COATED ORAL at 20:33

## 2018-08-18 RX ADMIN — Medication 50 MICROGRAM(S): at 08:49

## 2018-08-18 RX ADMIN — TAMSULOSIN HYDROCHLORIDE 0.4 MILLIGRAM(S): 0.4 CAPSULE ORAL at 20:33

## 2018-08-18 RX ADMIN — Medication 650 MILLIGRAM(S): at 00:00

## 2018-08-18 RX ADMIN — Medication 650 MILLIGRAM(S): at 13:31

## 2018-08-18 RX ADMIN — PANTOPRAZOLE SODIUM 40 MILLIGRAM(S): 20 TABLET, DELAYED RELEASE ORAL at 08:49

## 2018-08-19 PROCEDURE — 99232 SBSQ HOSP IP/OBS MODERATE 35: CPT

## 2018-08-19 RX ADMIN — TAMSULOSIN HYDROCHLORIDE 0.4 MILLIGRAM(S): 0.4 CAPSULE ORAL at 21:21

## 2018-08-19 RX ADMIN — Medication 1 APPLICATION(S): at 21:20

## 2018-08-19 RX ADMIN — Medication 1000 UNIT(S): at 08:18

## 2018-08-19 RX ADMIN — HALOPERIDOL DECANOATE 5 MILLIGRAM(S): 100 INJECTION INTRAMUSCULAR at 22:11

## 2018-08-19 RX ADMIN — Medication 50 MILLIGRAM(S): at 22:11

## 2018-08-19 RX ADMIN — AMLODIPINE BESYLATE 5 MILLIGRAM(S): 2.5 TABLET ORAL at 08:18

## 2018-08-19 RX ADMIN — Medication 2 MILLIGRAM(S): at 08:19

## 2018-08-19 RX ADMIN — PANTOPRAZOLE SODIUM 40 MILLIGRAM(S): 20 TABLET, DELAYED RELEASE ORAL at 08:18

## 2018-08-19 RX ADMIN — Medication 1 APPLICATION(S): at 08:18

## 2018-08-19 RX ADMIN — OLANZAPINE 10 MILLIGRAM(S): 15 TABLET, FILM COATED ORAL at 21:20

## 2018-08-19 RX ADMIN — Medication 2 MILLIGRAM(S): at 22:11

## 2018-08-19 RX ADMIN — Medication 50 MICROGRAM(S): at 08:18

## 2018-08-19 RX ADMIN — CLOZAPINE 100 MILLIGRAM(S): 150 TABLET, ORALLY DISINTEGRATING ORAL at 21:20

## 2018-08-20 LAB
BASOPHILS # BLD AUTO: 0.03 K/UL — SIGNIFICANT CHANGE UP (ref 0–0.2)
BASOPHILS NFR BLD AUTO: 0.3 % — SIGNIFICANT CHANGE UP (ref 0–2)
EOSINOPHIL # BLD AUTO: 0.41 K/UL — SIGNIFICANT CHANGE UP (ref 0–0.5)
EOSINOPHIL NFR BLD AUTO: 4.3 % — SIGNIFICANT CHANGE UP (ref 0–6)
HCT VFR BLD CALC: 45.9 % — SIGNIFICANT CHANGE UP (ref 39–50)
HGB BLD-MCNC: 15.2 G/DL — SIGNIFICANT CHANGE UP (ref 13–17)
IMM GRANULOCYTES # BLD AUTO: 0.03 # — SIGNIFICANT CHANGE UP
IMM GRANULOCYTES NFR BLD AUTO: 0.3 % — SIGNIFICANT CHANGE UP (ref 0–1.5)
LYMPHOCYTES # BLD AUTO: 3.28 K/UL — SIGNIFICANT CHANGE UP (ref 1–3.3)
LYMPHOCYTES # BLD AUTO: 34.3 % — SIGNIFICANT CHANGE UP (ref 13–44)
MCHC RBC-ENTMCNC: 29.6 PG — SIGNIFICANT CHANGE UP (ref 27–34)
MCHC RBC-ENTMCNC: 33.1 % — SIGNIFICANT CHANGE UP (ref 32–36)
MCV RBC AUTO: 89.3 FL — SIGNIFICANT CHANGE UP (ref 80–100)
MONOCYTES # BLD AUTO: 0.69 K/UL — SIGNIFICANT CHANGE UP (ref 0–0.9)
MONOCYTES NFR BLD AUTO: 7.2 % — SIGNIFICANT CHANGE UP (ref 2–14)
NEUTROPHILS # BLD AUTO: 5.13 K/UL — SIGNIFICANT CHANGE UP (ref 1.8–7.4)
NEUTROPHILS NFR BLD AUTO: 53.6 % — SIGNIFICANT CHANGE UP (ref 43–77)
NRBC # FLD: 0 — SIGNIFICANT CHANGE UP
PLATELET # BLD AUTO: 292 K/UL — SIGNIFICANT CHANGE UP (ref 150–400)
PMV BLD: 9.5 FL — SIGNIFICANT CHANGE UP (ref 7–13)
RBC # BLD: 5.14 M/UL — SIGNIFICANT CHANGE UP (ref 4.2–5.8)
RBC # FLD: 13.3 % — SIGNIFICANT CHANGE UP (ref 10.3–14.5)
WBC # BLD: 9.57 K/UL — SIGNIFICANT CHANGE UP (ref 3.8–10.5)
WBC # FLD AUTO: 9.57 K/UL — SIGNIFICANT CHANGE UP (ref 3.8–10.5)

## 2018-08-20 PROCEDURE — 99232 SBSQ HOSP IP/OBS MODERATE 35: CPT

## 2018-08-20 RX ADMIN — PANTOPRAZOLE SODIUM 40 MILLIGRAM(S): 20 TABLET, DELAYED RELEASE ORAL at 08:35

## 2018-08-20 RX ADMIN — TAMSULOSIN HYDROCHLORIDE 0.4 MILLIGRAM(S): 0.4 CAPSULE ORAL at 21:52

## 2018-08-20 RX ADMIN — Medication 1 APPLICATION(S): at 21:52

## 2018-08-20 RX ADMIN — OLANZAPINE 10 MILLIGRAM(S): 15 TABLET, FILM COATED ORAL at 21:52

## 2018-08-20 RX ADMIN — Medication 50 MICROGRAM(S): at 08:35

## 2018-08-20 RX ADMIN — Medication 1 APPLICATION(S): at 08:35

## 2018-08-20 RX ADMIN — Medication 1000 UNIT(S): at 08:36

## 2018-08-20 RX ADMIN — AMLODIPINE BESYLATE 5 MILLIGRAM(S): 2.5 TABLET ORAL at 08:36

## 2018-08-20 RX ADMIN — CLOZAPINE 100 MILLIGRAM(S): 150 TABLET, ORALLY DISINTEGRATING ORAL at 21:51

## 2018-08-21 PROCEDURE — 99232 SBSQ HOSP IP/OBS MODERATE 35: CPT

## 2018-08-21 RX ORDER — CLOZAPINE 150 MG/1
125 TABLET, ORALLY DISINTEGRATING ORAL AT BEDTIME
Qty: 0 | Refills: 0 | Status: DISCONTINUED | OUTPATIENT
Start: 2018-08-21 | End: 2018-08-22

## 2018-08-21 RX ADMIN — Medication 50 MICROGRAM(S): at 08:49

## 2018-08-21 RX ADMIN — TAMSULOSIN HYDROCHLORIDE 0.4 MILLIGRAM(S): 0.4 CAPSULE ORAL at 21:15

## 2018-08-21 RX ADMIN — OLANZAPINE 10 MILLIGRAM(S): 15 TABLET, FILM COATED ORAL at 21:15

## 2018-08-21 RX ADMIN — Medication 1 APPLICATION(S): at 08:49

## 2018-08-21 RX ADMIN — Medication 1000 UNIT(S): at 08:49

## 2018-08-21 RX ADMIN — AMLODIPINE BESYLATE 5 MILLIGRAM(S): 2.5 TABLET ORAL at 08:49

## 2018-08-21 RX ADMIN — CLOZAPINE 125 MILLIGRAM(S): 150 TABLET, ORALLY DISINTEGRATING ORAL at 21:15

## 2018-08-21 RX ADMIN — Medication 1 APPLICATION(S): at 21:15

## 2018-08-21 RX ADMIN — PANTOPRAZOLE SODIUM 40 MILLIGRAM(S): 20 TABLET, DELAYED RELEASE ORAL at 07:56

## 2018-08-22 PROCEDURE — 99231 SBSQ HOSP IP/OBS SF/LOW 25: CPT

## 2018-08-22 RX ORDER — CLOZAPINE 150 MG/1
150 TABLET, ORALLY DISINTEGRATING ORAL AT BEDTIME
Qty: 0 | Refills: 0 | Status: DISCONTINUED | OUTPATIENT
Start: 2018-08-22 | End: 2018-08-23

## 2018-08-22 RX ADMIN — AMLODIPINE BESYLATE 5 MILLIGRAM(S): 2.5 TABLET ORAL at 08:53

## 2018-08-22 RX ADMIN — Medication 1000 UNIT(S): at 08:53

## 2018-08-22 RX ADMIN — TAMSULOSIN HYDROCHLORIDE 0.4 MILLIGRAM(S): 0.4 CAPSULE ORAL at 20:53

## 2018-08-22 RX ADMIN — Medication 50 MICROGRAM(S): at 08:53

## 2018-08-22 RX ADMIN — CLOZAPINE 150 MILLIGRAM(S): 150 TABLET, ORALLY DISINTEGRATING ORAL at 20:53

## 2018-08-22 RX ADMIN — PANTOPRAZOLE SODIUM 40 MILLIGRAM(S): 20 TABLET, DELAYED RELEASE ORAL at 08:53

## 2018-08-22 RX ADMIN — OLANZAPINE 10 MILLIGRAM(S): 15 TABLET, FILM COATED ORAL at 20:53

## 2018-08-22 RX ADMIN — Medication 1 APPLICATION(S): at 20:53

## 2018-08-23 PROCEDURE — 99231 SBSQ HOSP IP/OBS SF/LOW 25: CPT

## 2018-08-23 RX ORDER — CLOZAPINE 150 MG/1
175 TABLET, ORALLY DISINTEGRATING ORAL AT BEDTIME
Qty: 0 | Refills: 0 | Status: DISCONTINUED | OUTPATIENT
Start: 2018-08-23 | End: 2018-08-24

## 2018-08-23 RX ADMIN — Medication 1000 UNIT(S): at 08:49

## 2018-08-23 RX ADMIN — AMLODIPINE BESYLATE 5 MILLIGRAM(S): 2.5 TABLET ORAL at 08:49

## 2018-08-23 RX ADMIN — Medication 1 APPLICATION(S): at 21:39

## 2018-08-23 RX ADMIN — Medication 50 MICROGRAM(S): at 08:49

## 2018-08-23 RX ADMIN — OLANZAPINE 10 MILLIGRAM(S): 15 TABLET, FILM COATED ORAL at 21:39

## 2018-08-23 RX ADMIN — CLOZAPINE 175 MILLIGRAM(S): 150 TABLET, ORALLY DISINTEGRATING ORAL at 21:39

## 2018-08-23 RX ADMIN — Medication 1 APPLICATION(S): at 08:49

## 2018-08-23 RX ADMIN — TAMSULOSIN HYDROCHLORIDE 0.4 MILLIGRAM(S): 0.4 CAPSULE ORAL at 21:39

## 2018-08-23 RX ADMIN — PANTOPRAZOLE SODIUM 40 MILLIGRAM(S): 20 TABLET, DELAYED RELEASE ORAL at 07:42

## 2018-08-24 PROCEDURE — 99232 SBSQ HOSP IP/OBS MODERATE 35: CPT

## 2018-08-24 RX ORDER — CLOZAPINE 150 MG/1
200 TABLET, ORALLY DISINTEGRATING ORAL AT BEDTIME
Qty: 0 | Refills: 0 | Status: DISCONTINUED | OUTPATIENT
Start: 2018-08-24 | End: 2018-08-27

## 2018-08-24 RX ADMIN — Medication 50 MICROGRAM(S): at 09:05

## 2018-08-24 RX ADMIN — PANTOPRAZOLE SODIUM 40 MILLIGRAM(S): 20 TABLET, DELAYED RELEASE ORAL at 07:51

## 2018-08-24 RX ADMIN — CLOZAPINE 200 MILLIGRAM(S): 150 TABLET, ORALLY DISINTEGRATING ORAL at 21:53

## 2018-08-24 RX ADMIN — OLANZAPINE 10 MILLIGRAM(S): 15 TABLET, FILM COATED ORAL at 21:53

## 2018-08-24 RX ADMIN — Medication 1000 UNIT(S): at 09:05

## 2018-08-24 RX ADMIN — TAMSULOSIN HYDROCHLORIDE 0.4 MILLIGRAM(S): 0.4 CAPSULE ORAL at 21:53

## 2018-08-24 RX ADMIN — Medication 1 APPLICATION(S): at 21:53

## 2018-08-24 RX ADMIN — AMLODIPINE BESYLATE 5 MILLIGRAM(S): 2.5 TABLET ORAL at 09:05

## 2018-08-25 RX ADMIN — AMLODIPINE BESYLATE 5 MILLIGRAM(S): 2.5 TABLET ORAL at 09:13

## 2018-08-25 RX ADMIN — TAMSULOSIN HYDROCHLORIDE 0.4 MILLIGRAM(S): 0.4 CAPSULE ORAL at 21:03

## 2018-08-25 RX ADMIN — Medication 1 APPLICATION(S): at 09:13

## 2018-08-25 RX ADMIN — Medication 1 APPLICATION(S): at 21:03

## 2018-08-25 RX ADMIN — CLOZAPINE 200 MILLIGRAM(S): 150 TABLET, ORALLY DISINTEGRATING ORAL at 21:03

## 2018-08-25 RX ADMIN — OLANZAPINE 10 MILLIGRAM(S): 15 TABLET, FILM COATED ORAL at 21:03

## 2018-08-25 RX ADMIN — PANTOPRAZOLE SODIUM 40 MILLIGRAM(S): 20 TABLET, DELAYED RELEASE ORAL at 07:52

## 2018-08-25 RX ADMIN — Medication 50 MICROGRAM(S): at 09:13

## 2018-08-25 RX ADMIN — Medication 1000 UNIT(S): at 09:13

## 2018-08-26 RX ADMIN — Medication 1 APPLICATION(S): at 21:20

## 2018-08-26 RX ADMIN — Medication 1 APPLICATION(S): at 09:30

## 2018-08-26 RX ADMIN — OLANZAPINE 10 MILLIGRAM(S): 15 TABLET, FILM COATED ORAL at 21:20

## 2018-08-26 RX ADMIN — AMLODIPINE BESYLATE 5 MILLIGRAM(S): 2.5 TABLET ORAL at 09:30

## 2018-08-26 RX ADMIN — Medication 1000 UNIT(S): at 09:30

## 2018-08-26 RX ADMIN — Medication 50 MICROGRAM(S): at 09:30

## 2018-08-26 RX ADMIN — CLOZAPINE 200 MILLIGRAM(S): 150 TABLET, ORALLY DISINTEGRATING ORAL at 21:20

## 2018-08-26 RX ADMIN — TAMSULOSIN HYDROCHLORIDE 0.4 MILLIGRAM(S): 0.4 CAPSULE ORAL at 21:20

## 2018-08-26 RX ADMIN — PANTOPRAZOLE SODIUM 40 MILLIGRAM(S): 20 TABLET, DELAYED RELEASE ORAL at 08:30

## 2018-08-27 LAB
BASOPHILS # BLD AUTO: 0.03 K/UL — SIGNIFICANT CHANGE UP (ref 0–0.2)
BASOPHILS NFR BLD AUTO: 0.3 % — SIGNIFICANT CHANGE UP (ref 0–2)
EOSINOPHIL # BLD AUTO: 0.52 K/UL — HIGH (ref 0–0.5)
EOSINOPHIL NFR BLD AUTO: 5.7 % — SIGNIFICANT CHANGE UP (ref 0–6)
HCT VFR BLD CALC: 44.5 % — SIGNIFICANT CHANGE UP (ref 39–50)
HGB BLD-MCNC: 14.9 G/DL — SIGNIFICANT CHANGE UP (ref 13–17)
IMM GRANULOCYTES # BLD AUTO: 0.03 # — SIGNIFICANT CHANGE UP
IMM GRANULOCYTES NFR BLD AUTO: 0.3 % — SIGNIFICANT CHANGE UP (ref 0–1.5)
LYMPHOCYTES # BLD AUTO: 3.01 K/UL — SIGNIFICANT CHANGE UP (ref 1–3.3)
LYMPHOCYTES # BLD AUTO: 32.8 % — SIGNIFICANT CHANGE UP (ref 13–44)
MCHC RBC-ENTMCNC: 30.2 PG — SIGNIFICANT CHANGE UP (ref 27–34)
MCHC RBC-ENTMCNC: 33.5 % — SIGNIFICANT CHANGE UP (ref 32–36)
MCV RBC AUTO: 90.3 FL — SIGNIFICANT CHANGE UP (ref 80–100)
MONOCYTES # BLD AUTO: 0.63 K/UL — SIGNIFICANT CHANGE UP (ref 0–0.9)
MONOCYTES NFR BLD AUTO: 6.9 % — SIGNIFICANT CHANGE UP (ref 2–14)
NEUTROPHILS # BLD AUTO: 4.96 K/UL — SIGNIFICANT CHANGE UP (ref 1.8–7.4)
NEUTROPHILS NFR BLD AUTO: 54 % — SIGNIFICANT CHANGE UP (ref 43–77)
NRBC # FLD: 0 — SIGNIFICANT CHANGE UP
PLATELET # BLD AUTO: 289 K/UL — SIGNIFICANT CHANGE UP (ref 150–400)
PMV BLD: 9.5 FL — SIGNIFICANT CHANGE UP (ref 7–13)
RBC # BLD: 4.93 M/UL — SIGNIFICANT CHANGE UP (ref 4.2–5.8)
RBC # FLD: 14.3 % — SIGNIFICANT CHANGE UP (ref 10.3–14.5)
WBC # BLD: 9.18 K/UL — SIGNIFICANT CHANGE UP (ref 3.8–10.5)
WBC # FLD AUTO: 9.18 K/UL — SIGNIFICANT CHANGE UP (ref 3.8–10.5)

## 2018-08-27 PROCEDURE — 99231 SBSQ HOSP IP/OBS SF/LOW 25: CPT

## 2018-08-27 RX ORDER — CLOZAPINE 150 MG/1
225 TABLET, ORALLY DISINTEGRATING ORAL AT BEDTIME
Qty: 0 | Refills: 0 | Status: DISCONTINUED | OUTPATIENT
Start: 2018-08-27 | End: 2018-08-28

## 2018-08-27 RX ADMIN — TAMSULOSIN HYDROCHLORIDE 0.4 MILLIGRAM(S): 0.4 CAPSULE ORAL at 22:07

## 2018-08-27 RX ADMIN — Medication 1 APPLICATION(S): at 22:07

## 2018-08-27 RX ADMIN — PANTOPRAZOLE SODIUM 40 MILLIGRAM(S): 20 TABLET, DELAYED RELEASE ORAL at 08:33

## 2018-08-27 RX ADMIN — Medication 1 APPLICATION(S): at 08:33

## 2018-08-27 RX ADMIN — Medication 1000 UNIT(S): at 08:33

## 2018-08-27 RX ADMIN — Medication 50 MICROGRAM(S): at 08:33

## 2018-08-27 RX ADMIN — CLOZAPINE 225 MILLIGRAM(S): 150 TABLET, ORALLY DISINTEGRATING ORAL at 22:07

## 2018-08-27 RX ADMIN — OLANZAPINE 10 MILLIGRAM(S): 15 TABLET, FILM COATED ORAL at 22:07

## 2018-08-27 RX ADMIN — AMLODIPINE BESYLATE 5 MILLIGRAM(S): 2.5 TABLET ORAL at 08:33

## 2018-08-28 PROCEDURE — 99232 SBSQ HOSP IP/OBS MODERATE 35: CPT

## 2018-08-28 RX ORDER — CLOZAPINE 150 MG/1
250 TABLET, ORALLY DISINTEGRATING ORAL AT BEDTIME
Qty: 0 | Refills: 0 | Status: DISCONTINUED | OUTPATIENT
Start: 2018-08-28 | End: 2018-08-29

## 2018-08-28 RX ADMIN — PANTOPRAZOLE SODIUM 40 MILLIGRAM(S): 20 TABLET, DELAYED RELEASE ORAL at 08:39

## 2018-08-28 RX ADMIN — Medication 50 MICROGRAM(S): at 08:39

## 2018-08-28 RX ADMIN — Medication 1 APPLICATION(S): at 20:44

## 2018-08-28 RX ADMIN — CLOZAPINE 250 MILLIGRAM(S): 150 TABLET, ORALLY DISINTEGRATING ORAL at 20:44

## 2018-08-28 RX ADMIN — Medication 1000 UNIT(S): at 08:39

## 2018-08-28 RX ADMIN — AMLODIPINE BESYLATE 5 MILLIGRAM(S): 2.5 TABLET ORAL at 08:39

## 2018-08-28 RX ADMIN — Medication 1 APPLICATION(S): at 08:39

## 2018-08-28 RX ADMIN — TAMSULOSIN HYDROCHLORIDE 0.4 MILLIGRAM(S): 0.4 CAPSULE ORAL at 20:44

## 2018-08-28 RX ADMIN — OLANZAPINE 10 MILLIGRAM(S): 15 TABLET, FILM COATED ORAL at 20:44

## 2018-08-29 PROCEDURE — 99231 SBSQ HOSP IP/OBS SF/LOW 25: CPT

## 2018-08-29 RX ORDER — CLOZAPINE 150 MG/1
275 TABLET, ORALLY DISINTEGRATING ORAL AT BEDTIME
Qty: 0 | Refills: 0 | Status: DISCONTINUED | OUTPATIENT
Start: 2018-08-29 | End: 2018-08-30

## 2018-08-29 RX ADMIN — Medication 50 MICROGRAM(S): at 08:25

## 2018-08-29 RX ADMIN — PANTOPRAZOLE SODIUM 40 MILLIGRAM(S): 20 TABLET, DELAYED RELEASE ORAL at 07:34

## 2018-08-29 RX ADMIN — CLOZAPINE 275 MILLIGRAM(S): 150 TABLET, ORALLY DISINTEGRATING ORAL at 20:42

## 2018-08-29 RX ADMIN — TAMSULOSIN HYDROCHLORIDE 0.4 MILLIGRAM(S): 0.4 CAPSULE ORAL at 20:42

## 2018-08-29 RX ADMIN — Medication 1 APPLICATION(S): at 20:42

## 2018-08-29 RX ADMIN — Medication 1 APPLICATION(S): at 08:25

## 2018-08-29 RX ADMIN — Medication 1000 UNIT(S): at 08:25

## 2018-08-29 RX ADMIN — AMLODIPINE BESYLATE 5 MILLIGRAM(S): 2.5 TABLET ORAL at 08:25

## 2018-08-29 RX ADMIN — OLANZAPINE 10 MILLIGRAM(S): 15 TABLET, FILM COATED ORAL at 20:42

## 2018-08-30 PROCEDURE — 99231 SBSQ HOSP IP/OBS SF/LOW 25: CPT

## 2018-08-30 RX ORDER — CLOZAPINE 150 MG/1
300 TABLET, ORALLY DISINTEGRATING ORAL AT BEDTIME
Qty: 0 | Refills: 0 | Status: DISCONTINUED | OUTPATIENT
Start: 2018-08-30 | End: 2018-09-13

## 2018-08-30 RX ADMIN — Medication 1000 UNIT(S): at 09:01

## 2018-08-30 RX ADMIN — OLANZAPINE 10 MILLIGRAM(S): 15 TABLET, FILM COATED ORAL at 20:57

## 2018-08-30 RX ADMIN — TAMSULOSIN HYDROCHLORIDE 0.4 MILLIGRAM(S): 0.4 CAPSULE ORAL at 20:57

## 2018-08-30 RX ADMIN — CLOZAPINE 300 MILLIGRAM(S): 150 TABLET, ORALLY DISINTEGRATING ORAL at 20:57

## 2018-08-30 RX ADMIN — Medication 50 MICROGRAM(S): at 09:01

## 2018-08-30 RX ADMIN — PANTOPRAZOLE SODIUM 40 MILLIGRAM(S): 20 TABLET, DELAYED RELEASE ORAL at 09:01

## 2018-08-30 RX ADMIN — AMLODIPINE BESYLATE 5 MILLIGRAM(S): 2.5 TABLET ORAL at 09:01

## 2018-08-30 RX ADMIN — Medication 1 APPLICATION(S): at 20:57

## 2018-08-31 PROCEDURE — 99232 SBSQ HOSP IP/OBS MODERATE 35: CPT

## 2018-08-31 RX ORDER — VENLAFAXINE HCL 75 MG
37.5 CAPSULE, EXT RELEASE 24 HR ORAL DAILY
Qty: 0 | Refills: 0 | Status: DISCONTINUED | OUTPATIENT
Start: 2018-09-04 | End: 2018-09-07

## 2018-08-31 RX ADMIN — OLANZAPINE 10 MILLIGRAM(S): 15 TABLET, FILM COATED ORAL at 20:46

## 2018-08-31 RX ADMIN — CLOZAPINE 300 MILLIGRAM(S): 150 TABLET, ORALLY DISINTEGRATING ORAL at 20:46

## 2018-08-31 RX ADMIN — AMLODIPINE BESYLATE 5 MILLIGRAM(S): 2.5 TABLET ORAL at 08:15

## 2018-08-31 RX ADMIN — TAMSULOSIN HYDROCHLORIDE 0.4 MILLIGRAM(S): 0.4 CAPSULE ORAL at 20:46

## 2018-08-31 RX ADMIN — Medication 1 APPLICATION(S): at 08:16

## 2018-08-31 RX ADMIN — PANTOPRAZOLE SODIUM 40 MILLIGRAM(S): 20 TABLET, DELAYED RELEASE ORAL at 08:15

## 2018-08-31 RX ADMIN — Medication 1 APPLICATION(S): at 20:46

## 2018-08-31 RX ADMIN — Medication 50 MICROGRAM(S): at 08:15

## 2018-08-31 RX ADMIN — Medication 1000 UNIT(S): at 08:15

## 2018-09-01 RX ADMIN — OLANZAPINE 10 MILLIGRAM(S): 15 TABLET, FILM COATED ORAL at 20:45

## 2018-09-01 RX ADMIN — CLOZAPINE 300 MILLIGRAM(S): 150 TABLET, ORALLY DISINTEGRATING ORAL at 20:45

## 2018-09-01 RX ADMIN — PANTOPRAZOLE SODIUM 40 MILLIGRAM(S): 20 TABLET, DELAYED RELEASE ORAL at 08:47

## 2018-09-01 RX ADMIN — Medication 1000 UNIT(S): at 08:47

## 2018-09-01 RX ADMIN — AMLODIPINE BESYLATE 5 MILLIGRAM(S): 2.5 TABLET ORAL at 08:47

## 2018-09-01 RX ADMIN — Medication 50 MICROGRAM(S): at 08:47

## 2018-09-01 RX ADMIN — TAMSULOSIN HYDROCHLORIDE 0.4 MILLIGRAM(S): 0.4 CAPSULE ORAL at 20:45

## 2018-09-01 RX ADMIN — Medication 1 APPLICATION(S): at 20:45

## 2018-09-02 RX ADMIN — PANTOPRAZOLE SODIUM 40 MILLIGRAM(S): 20 TABLET, DELAYED RELEASE ORAL at 09:13

## 2018-09-02 RX ADMIN — Medication 50 MICROGRAM(S): at 09:12

## 2018-09-02 RX ADMIN — OLANZAPINE 10 MILLIGRAM(S): 15 TABLET, FILM COATED ORAL at 21:00

## 2018-09-02 RX ADMIN — TAMSULOSIN HYDROCHLORIDE 0.4 MILLIGRAM(S): 0.4 CAPSULE ORAL at 21:00

## 2018-09-02 RX ADMIN — CLOZAPINE 300 MILLIGRAM(S): 150 TABLET, ORALLY DISINTEGRATING ORAL at 21:00

## 2018-09-02 RX ADMIN — Medication 1 APPLICATION(S): at 21:00

## 2018-09-02 RX ADMIN — Medication 1 APPLICATION(S): at 12:53

## 2018-09-02 RX ADMIN — Medication 1000 UNIT(S): at 09:12

## 2018-09-02 RX ADMIN — AMLODIPINE BESYLATE 5 MILLIGRAM(S): 2.5 TABLET ORAL at 09:12

## 2018-09-03 RX ADMIN — Medication 50 MICROGRAM(S): at 09:08

## 2018-09-03 RX ADMIN — OLANZAPINE 10 MILLIGRAM(S): 15 TABLET, FILM COATED ORAL at 21:03

## 2018-09-03 RX ADMIN — AMLODIPINE BESYLATE 5 MILLIGRAM(S): 2.5 TABLET ORAL at 09:08

## 2018-09-03 RX ADMIN — CLOZAPINE 300 MILLIGRAM(S): 150 TABLET, ORALLY DISINTEGRATING ORAL at 21:03

## 2018-09-03 RX ADMIN — TAMSULOSIN HYDROCHLORIDE 0.4 MILLIGRAM(S): 0.4 CAPSULE ORAL at 21:03

## 2018-09-03 RX ADMIN — Medication 1 APPLICATION(S): at 21:03

## 2018-09-03 RX ADMIN — Medication 1000 UNIT(S): at 09:08

## 2018-09-04 LAB
BASOPHILS # BLD AUTO: 0.02 K/UL — SIGNIFICANT CHANGE UP (ref 0–0.2)
BASOPHILS NFR BLD AUTO: 0.2 % — SIGNIFICANT CHANGE UP (ref 0–2)
EOSINOPHIL # BLD AUTO: 0.39 K/UL — SIGNIFICANT CHANGE UP (ref 0–0.5)
EOSINOPHIL NFR BLD AUTO: 4.5 % — SIGNIFICANT CHANGE UP (ref 0–6)
HCT VFR BLD CALC: 46.4 % — SIGNIFICANT CHANGE UP (ref 39–50)
HGB BLD-MCNC: 15.4 G/DL — SIGNIFICANT CHANGE UP (ref 13–17)
IMM GRANULOCYTES # BLD AUTO: 0.02 # — SIGNIFICANT CHANGE UP
IMM GRANULOCYTES NFR BLD AUTO: 0.2 % — SIGNIFICANT CHANGE UP (ref 0–1.5)
LYMPHOCYTES # BLD AUTO: 3.01 K/UL — SIGNIFICANT CHANGE UP (ref 1–3.3)
LYMPHOCYTES # BLD AUTO: 34.8 % — SIGNIFICANT CHANGE UP (ref 13–44)
MCHC RBC-ENTMCNC: 29.2 PG — SIGNIFICANT CHANGE UP (ref 27–34)
MCHC RBC-ENTMCNC: 33.2 % — SIGNIFICANT CHANGE UP (ref 32–36)
MCV RBC AUTO: 87.9 FL — SIGNIFICANT CHANGE UP (ref 80–100)
MONOCYTES # BLD AUTO: 0.67 K/UL — SIGNIFICANT CHANGE UP (ref 0–0.9)
MONOCYTES NFR BLD AUTO: 7.8 % — SIGNIFICANT CHANGE UP (ref 2–14)
NEUTROPHILS # BLD AUTO: 4.53 K/UL — SIGNIFICANT CHANGE UP (ref 1.8–7.4)
NEUTROPHILS NFR BLD AUTO: 52.5 % — SIGNIFICANT CHANGE UP (ref 43–77)
NRBC # FLD: 0 — SIGNIFICANT CHANGE UP
PLATELET # BLD AUTO: 300 K/UL — SIGNIFICANT CHANGE UP (ref 150–400)
PMV BLD: 9.6 FL — SIGNIFICANT CHANGE UP (ref 7–13)
RBC # BLD: 5.28 M/UL — SIGNIFICANT CHANGE UP (ref 4.2–5.8)
RBC # FLD: 14 % — SIGNIFICANT CHANGE UP (ref 10.3–14.5)
WBC # BLD: 8.64 K/UL — SIGNIFICANT CHANGE UP (ref 3.8–10.5)
WBC # FLD AUTO: 8.64 K/UL — SIGNIFICANT CHANGE UP (ref 3.8–10.5)

## 2018-09-04 PROCEDURE — 99232 SBSQ HOSP IP/OBS MODERATE 35: CPT

## 2018-09-04 RX ADMIN — Medication 1000 UNIT(S): at 08:48

## 2018-09-04 RX ADMIN — PANTOPRAZOLE SODIUM 40 MILLIGRAM(S): 20 TABLET, DELAYED RELEASE ORAL at 08:48

## 2018-09-04 RX ADMIN — AMLODIPINE BESYLATE 5 MILLIGRAM(S): 2.5 TABLET ORAL at 08:48

## 2018-09-04 RX ADMIN — Medication 1 APPLICATION(S): at 08:49

## 2018-09-04 RX ADMIN — TAMSULOSIN HYDROCHLORIDE 0.4 MILLIGRAM(S): 0.4 CAPSULE ORAL at 20:12

## 2018-09-04 RX ADMIN — CLOZAPINE 300 MILLIGRAM(S): 150 TABLET, ORALLY DISINTEGRATING ORAL at 20:12

## 2018-09-04 RX ADMIN — Medication 1 APPLICATION(S): at 20:12

## 2018-09-04 RX ADMIN — Medication 50 MICROGRAM(S): at 08:48

## 2018-09-04 RX ADMIN — Medication 37.5 MILLIGRAM(S): at 08:49

## 2018-09-04 RX ADMIN — OLANZAPINE 10 MILLIGRAM(S): 15 TABLET, FILM COATED ORAL at 20:12

## 2018-09-05 PROCEDURE — 99231 SBSQ HOSP IP/OBS SF/LOW 25: CPT

## 2018-09-05 RX ORDER — TUBERCULIN PURIFIED PROTEIN DERIVATIVE 5 [IU]/.1ML
5 INJECTION, SOLUTION INTRADERMAL ONCE
Qty: 0 | Refills: 0 | Status: COMPLETED | OUTPATIENT
Start: 2018-09-06 | End: 2018-09-06

## 2018-09-05 RX ADMIN — Medication 1000 UNIT(S): at 09:01

## 2018-09-05 RX ADMIN — Medication 1 APPLICATION(S): at 09:05

## 2018-09-05 RX ADMIN — AMLODIPINE BESYLATE 5 MILLIGRAM(S): 2.5 TABLET ORAL at 09:01

## 2018-09-05 RX ADMIN — CLOZAPINE 300 MILLIGRAM(S): 150 TABLET, ORALLY DISINTEGRATING ORAL at 21:25

## 2018-09-05 RX ADMIN — Medication 37.5 MILLIGRAM(S): at 09:05

## 2018-09-05 RX ADMIN — Medication 50 MICROGRAM(S): at 09:02

## 2018-09-05 RX ADMIN — OLANZAPINE 10 MILLIGRAM(S): 15 TABLET, FILM COATED ORAL at 21:25

## 2018-09-05 RX ADMIN — TAMSULOSIN HYDROCHLORIDE 0.4 MILLIGRAM(S): 0.4 CAPSULE ORAL at 21:25

## 2018-09-05 RX ADMIN — PANTOPRAZOLE SODIUM 40 MILLIGRAM(S): 20 TABLET, DELAYED RELEASE ORAL at 09:05

## 2018-09-05 RX ADMIN — Medication 1 APPLICATION(S): at 21:25

## 2018-09-06 LAB
APPEARANCE UR: SIGNIFICANT CHANGE UP
BACTERIA # UR AUTO: SIGNIFICANT CHANGE UP
BILIRUB UR-MCNC: NEGATIVE — SIGNIFICANT CHANGE UP
BLOOD UR QL VISUAL: NEGATIVE — SIGNIFICANT CHANGE UP
CHOLEST SERPL-MCNC: 174 MG/DL — SIGNIFICANT CHANGE UP (ref 120–199)
COLOR SPEC: YELLOW — SIGNIFICANT CHANGE UP
GLUCOSE UR-MCNC: NEGATIVE — SIGNIFICANT CHANGE UP
HAV IGM SER-ACNC: NONREACTIVE — SIGNIFICANT CHANGE UP
HBA1C BLD-MCNC: 5 % — SIGNIFICANT CHANGE UP (ref 4–5.6)
HBV CORE IGM SER-ACNC: NONREACTIVE — SIGNIFICANT CHANGE UP
HBV SURFACE AG SER-ACNC: NONREACTIVE — SIGNIFICANT CHANGE UP
HCV AB S/CO SERPL IA: 0.07 S/CO — SIGNIFICANT CHANGE UP
HCV AB SERPL-IMP: SIGNIFICANT CHANGE UP
HDLC SERPL-MCNC: 40 MG/DL — SIGNIFICANT CHANGE UP (ref 35–55)
HYALINE CASTS # UR AUTO: NEGATIVE — SIGNIFICANT CHANGE UP
KETONES UR-MCNC: NEGATIVE — SIGNIFICANT CHANGE UP
LEUKOCYTE ESTERASE UR-ACNC: SIGNIFICANT CHANGE UP
LIPID PNL WITH DIRECT LDL SERPL: 119 MG/DL — SIGNIFICANT CHANGE UP
NITRITE UR-MCNC: NEGATIVE — SIGNIFICANT CHANGE UP
PH UR: 7.5 — SIGNIFICANT CHANGE UP (ref 5–8)
PROT UR-MCNC: 50 — SIGNIFICANT CHANGE UP
RBC CASTS # UR COMP ASSIST: SIGNIFICANT CHANGE UP (ref 0–?)
SP GR SPEC: 1.01 — SIGNIFICANT CHANGE UP (ref 1–1.04)
SQUAMOUS # UR AUTO: SIGNIFICANT CHANGE UP
TRIGL SERPL-MCNC: 111 MG/DL — SIGNIFICANT CHANGE UP (ref 10–149)
TSH SERPL-MCNC: 3.11 UIU/ML — SIGNIFICANT CHANGE UP (ref 0.27–4.2)
UROBILINOGEN FLD QL: NORMAL — SIGNIFICANT CHANGE UP
WBC UR QL: >50 — HIGH (ref 0–?)

## 2018-09-06 PROCEDURE — 99231 SBSQ HOSP IP/OBS SF/LOW 25: CPT

## 2018-09-06 RX ADMIN — CLOZAPINE 300 MILLIGRAM(S): 150 TABLET, ORALLY DISINTEGRATING ORAL at 20:39

## 2018-09-06 RX ADMIN — PANTOPRAZOLE SODIUM 40 MILLIGRAM(S): 20 TABLET, DELAYED RELEASE ORAL at 09:05

## 2018-09-06 RX ADMIN — Medication 1 APPLICATION(S): at 20:39

## 2018-09-06 RX ADMIN — TUBERCULIN PURIFIED PROTEIN DERIVATIVE 5 UNIT(S): 5 INJECTION, SOLUTION INTRADERMAL at 11:43

## 2018-09-06 RX ADMIN — AMLODIPINE BESYLATE 5 MILLIGRAM(S): 2.5 TABLET ORAL at 09:05

## 2018-09-06 RX ADMIN — OLANZAPINE 10 MILLIGRAM(S): 15 TABLET, FILM COATED ORAL at 20:39

## 2018-09-06 RX ADMIN — Medication 50 MICROGRAM(S): at 09:05

## 2018-09-06 RX ADMIN — TAMSULOSIN HYDROCHLORIDE 0.4 MILLIGRAM(S): 0.4 CAPSULE ORAL at 20:39

## 2018-09-06 RX ADMIN — Medication 1000 UNIT(S): at 09:05

## 2018-09-06 RX ADMIN — Medication 1 APPLICATION(S): at 09:05

## 2018-09-06 RX ADMIN — Medication 37.5 MILLIGRAM(S): at 09:06

## 2018-09-07 PROCEDURE — 99232 SBSQ HOSP IP/OBS MODERATE 35: CPT

## 2018-09-07 RX ADMIN — PANTOPRAZOLE SODIUM 40 MILLIGRAM(S): 20 TABLET, DELAYED RELEASE ORAL at 07:24

## 2018-09-07 RX ADMIN — AMLODIPINE BESYLATE 5 MILLIGRAM(S): 2.5 TABLET ORAL at 08:35

## 2018-09-07 RX ADMIN — TAMSULOSIN HYDROCHLORIDE 0.4 MILLIGRAM(S): 0.4 CAPSULE ORAL at 21:40

## 2018-09-07 RX ADMIN — Medication 1 APPLICATION(S): at 21:40

## 2018-09-07 RX ADMIN — Medication 1000 UNIT(S): at 08:35

## 2018-09-07 RX ADMIN — OLANZAPINE 10 MILLIGRAM(S): 15 TABLET, FILM COATED ORAL at 21:40

## 2018-09-07 RX ADMIN — Medication 50 MICROGRAM(S): at 08:35

## 2018-09-07 RX ADMIN — Medication 37.5 MILLIGRAM(S): at 08:35

## 2018-09-07 RX ADMIN — CLOZAPINE 300 MILLIGRAM(S): 150 TABLET, ORALLY DISINTEGRATING ORAL at 21:40

## 2018-09-08 RX ADMIN — OLANZAPINE 10 MILLIGRAM(S): 15 TABLET, FILM COATED ORAL at 20:18

## 2018-09-08 RX ADMIN — Medication 1 APPLICATION(S): at 20:18

## 2018-09-08 RX ADMIN — Medication 50 MICROGRAM(S): at 09:28

## 2018-09-08 RX ADMIN — TAMSULOSIN HYDROCHLORIDE 0.4 MILLIGRAM(S): 0.4 CAPSULE ORAL at 20:18

## 2018-09-08 RX ADMIN — CLOZAPINE 300 MILLIGRAM(S): 150 TABLET, ORALLY DISINTEGRATING ORAL at 20:18

## 2018-09-08 RX ADMIN — AMLODIPINE BESYLATE 5 MILLIGRAM(S): 2.5 TABLET ORAL at 09:28

## 2018-09-08 RX ADMIN — PANTOPRAZOLE SODIUM 40 MILLIGRAM(S): 20 TABLET, DELAYED RELEASE ORAL at 09:28

## 2018-09-08 RX ADMIN — Medication 1000 UNIT(S): at 09:28

## 2018-09-09 RX ADMIN — OLANZAPINE 10 MILLIGRAM(S): 15 TABLET, FILM COATED ORAL at 20:40

## 2018-09-09 RX ADMIN — AMLODIPINE BESYLATE 5 MILLIGRAM(S): 2.5 TABLET ORAL at 09:53

## 2018-09-09 RX ADMIN — Medication 1 APPLICATION(S): at 20:40

## 2018-09-09 RX ADMIN — TAMSULOSIN HYDROCHLORIDE 0.4 MILLIGRAM(S): 0.4 CAPSULE ORAL at 20:40

## 2018-09-09 RX ADMIN — CLOZAPINE 300 MILLIGRAM(S): 150 TABLET, ORALLY DISINTEGRATING ORAL at 20:40

## 2018-09-09 RX ADMIN — Medication 1000 UNIT(S): at 09:53

## 2018-09-09 RX ADMIN — PANTOPRAZOLE SODIUM 40 MILLIGRAM(S): 20 TABLET, DELAYED RELEASE ORAL at 09:53

## 2018-09-09 RX ADMIN — Medication 50 MICROGRAM(S): at 09:53

## 2018-09-10 PROCEDURE — 99231 SBSQ HOSP IP/OBS SF/LOW 25: CPT

## 2018-09-10 RX ORDER — AMLODIPINE BESYLATE 2.5 MG/1
10 TABLET ORAL DAILY
Qty: 0 | Refills: 0 | Status: DISCONTINUED | OUTPATIENT
Start: 2018-09-10 | End: 2018-09-18

## 2018-09-10 RX ADMIN — Medication 1 APPLICATION(S): at 20:46

## 2018-09-10 RX ADMIN — TAMSULOSIN HYDROCHLORIDE 0.4 MILLIGRAM(S): 0.4 CAPSULE ORAL at 20:46

## 2018-09-10 RX ADMIN — PANTOPRAZOLE SODIUM 40 MILLIGRAM(S): 20 TABLET, DELAYED RELEASE ORAL at 09:34

## 2018-09-10 RX ADMIN — Medication 1 APPLICATION(S): at 09:35

## 2018-09-10 RX ADMIN — Medication 50 MICROGRAM(S): at 09:34

## 2018-09-10 RX ADMIN — Medication 1000 UNIT(S): at 09:34

## 2018-09-10 RX ADMIN — AMLODIPINE BESYLATE 5 MILLIGRAM(S): 2.5 TABLET ORAL at 09:34

## 2018-09-10 RX ADMIN — OLANZAPINE 10 MILLIGRAM(S): 15 TABLET, FILM COATED ORAL at 20:46

## 2018-09-10 RX ADMIN — CLOZAPINE 300 MILLIGRAM(S): 150 TABLET, ORALLY DISINTEGRATING ORAL at 20:46

## 2018-09-11 LAB
ALBUMIN SERPL ELPH-MCNC: 4 G/DL — SIGNIFICANT CHANGE UP (ref 3.3–5)
ALP SERPL-CCNC: 108 U/L — SIGNIFICANT CHANGE UP (ref 40–120)
ALT FLD-CCNC: 7 U/L — SIGNIFICANT CHANGE UP (ref 4–41)
AST SERPL-CCNC: 13 U/L — SIGNIFICANT CHANGE UP (ref 4–40)
BASOPHILS # BLD AUTO: 0.03 K/UL — SIGNIFICANT CHANGE UP (ref 0–0.2)
BASOPHILS NFR BLD AUTO: 0.3 % — SIGNIFICANT CHANGE UP (ref 0–2)
BILIRUB SERPL-MCNC: 0.5 MG/DL — SIGNIFICANT CHANGE UP (ref 0.2–1.2)
BUN SERPL-MCNC: 22 MG/DL — SIGNIFICANT CHANGE UP (ref 7–23)
CALCIUM SERPL-MCNC: 9.3 MG/DL — SIGNIFICANT CHANGE UP (ref 8.4–10.5)
CHLORIDE SERPL-SCNC: 102 MMOL/L — SIGNIFICANT CHANGE UP (ref 98–107)
CO2 SERPL-SCNC: 23 MMOL/L — SIGNIFICANT CHANGE UP (ref 22–31)
CREAT SERPL-MCNC: 0.99 MG/DL — SIGNIFICANT CHANGE UP (ref 0.5–1.3)
EOSINOPHIL # BLD AUTO: 0.31 K/UL — SIGNIFICANT CHANGE UP (ref 0–0.5)
EOSINOPHIL NFR BLD AUTO: 3.5 % — SIGNIFICANT CHANGE UP (ref 0–6)
GLUCOSE SERPL-MCNC: 94 MG/DL — SIGNIFICANT CHANGE UP (ref 70–99)
HCT VFR BLD CALC: 47.6 % — SIGNIFICANT CHANGE UP (ref 39–50)
HGB BLD-MCNC: 15.8 G/DL — SIGNIFICANT CHANGE UP (ref 13–17)
IMM GRANULOCYTES # BLD AUTO: 0.02 # — SIGNIFICANT CHANGE UP
IMM GRANULOCYTES NFR BLD AUTO: 0.2 % — SIGNIFICANT CHANGE UP (ref 0–1.5)
LYMPHOCYTES # BLD AUTO: 2.68 K/UL — SIGNIFICANT CHANGE UP (ref 1–3.3)
LYMPHOCYTES # BLD AUTO: 29.9 % — SIGNIFICANT CHANGE UP (ref 13–44)
MCHC RBC-ENTMCNC: 29.2 PG — SIGNIFICANT CHANGE UP (ref 27–34)
MCHC RBC-ENTMCNC: 33.2 % — SIGNIFICANT CHANGE UP (ref 32–36)
MCV RBC AUTO: 87.8 FL — SIGNIFICANT CHANGE UP (ref 80–100)
MONOCYTES # BLD AUTO: 0.78 K/UL — SIGNIFICANT CHANGE UP (ref 0–0.9)
MONOCYTES NFR BLD AUTO: 8.7 % — SIGNIFICANT CHANGE UP (ref 2–14)
NEUTROPHILS # BLD AUTO: 5.15 K/UL — SIGNIFICANT CHANGE UP (ref 1.8–7.4)
NEUTROPHILS NFR BLD AUTO: 57.4 % — SIGNIFICANT CHANGE UP (ref 43–77)
NRBC # FLD: 0 — SIGNIFICANT CHANGE UP
PLATELET # BLD AUTO: 362 K/UL — SIGNIFICANT CHANGE UP (ref 150–400)
PMV BLD: 9.5 FL — SIGNIFICANT CHANGE UP (ref 7–13)
POTASSIUM SERPL-MCNC: 3.7 MMOL/L — SIGNIFICANT CHANGE UP (ref 3.5–5.3)
POTASSIUM SERPL-SCNC: 3.7 MMOL/L — SIGNIFICANT CHANGE UP (ref 3.5–5.3)
PROT SERPL-MCNC: 8.2 G/DL — SIGNIFICANT CHANGE UP (ref 6–8.3)
RBC # BLD: 5.42 M/UL — SIGNIFICANT CHANGE UP (ref 4.2–5.8)
RBC # FLD: 13.7 % — SIGNIFICANT CHANGE UP (ref 10.3–14.5)
SODIUM SERPL-SCNC: 141 MMOL/L — SIGNIFICANT CHANGE UP (ref 135–145)
WBC # BLD: 8.97 K/UL — SIGNIFICANT CHANGE UP (ref 3.8–10.5)
WBC # FLD AUTO: 8.97 K/UL — SIGNIFICANT CHANGE UP (ref 3.8–10.5)

## 2018-09-11 PROCEDURE — 99222 1ST HOSP IP/OBS MODERATE 55: CPT

## 2018-09-11 RX ADMIN — Medication 1 APPLICATION(S): at 08:42

## 2018-09-11 RX ADMIN — Medication 1 APPLICATION(S): at 21:00

## 2018-09-11 RX ADMIN — CLOZAPINE 300 MILLIGRAM(S): 150 TABLET, ORALLY DISINTEGRATING ORAL at 21:00

## 2018-09-11 RX ADMIN — Medication 50 MICROGRAM(S): at 08:42

## 2018-09-11 RX ADMIN — TAMSULOSIN HYDROCHLORIDE 0.4 MILLIGRAM(S): 0.4 CAPSULE ORAL at 21:00

## 2018-09-11 RX ADMIN — Medication 1000 UNIT(S): at 08:42

## 2018-09-11 RX ADMIN — OLANZAPINE 10 MILLIGRAM(S): 15 TABLET, FILM COATED ORAL at 21:00

## 2018-09-11 RX ADMIN — AMLODIPINE BESYLATE 10 MILLIGRAM(S): 2.5 TABLET ORAL at 08:42

## 2018-09-11 RX ADMIN — PANTOPRAZOLE SODIUM 40 MILLIGRAM(S): 20 TABLET, DELAYED RELEASE ORAL at 08:42

## 2018-09-12 PROCEDURE — 99232 SBSQ HOSP IP/OBS MODERATE 35: CPT

## 2018-09-12 RX ORDER — ATENOLOL 25 MG/1
25 TABLET ORAL DAILY
Qty: 0 | Refills: 0 | Status: DISCONTINUED | OUTPATIENT
Start: 2018-09-12 | End: 2018-09-18

## 2018-09-12 RX ADMIN — CLOZAPINE 300 MILLIGRAM(S): 150 TABLET, ORALLY DISINTEGRATING ORAL at 20:55

## 2018-09-12 RX ADMIN — OLANZAPINE 10 MILLIGRAM(S): 15 TABLET, FILM COATED ORAL at 20:55

## 2018-09-12 RX ADMIN — Medication 1 APPLICATION(S): at 08:18

## 2018-09-12 RX ADMIN — PANTOPRAZOLE SODIUM 40 MILLIGRAM(S): 20 TABLET, DELAYED RELEASE ORAL at 08:18

## 2018-09-12 RX ADMIN — AMLODIPINE BESYLATE 10 MILLIGRAM(S): 2.5 TABLET ORAL at 08:18

## 2018-09-12 RX ADMIN — Medication 1 APPLICATION(S): at 20:55

## 2018-09-12 RX ADMIN — TAMSULOSIN HYDROCHLORIDE 0.4 MILLIGRAM(S): 0.4 CAPSULE ORAL at 20:55

## 2018-09-12 RX ADMIN — Medication 1000 UNIT(S): at 08:18

## 2018-09-12 RX ADMIN — Medication 50 MICROGRAM(S): at 08:18

## 2018-09-13 PROCEDURE — 99231 SBSQ HOSP IP/OBS SF/LOW 25: CPT

## 2018-09-13 RX ORDER — CLOZAPINE 150 MG/1
325 TABLET, ORALLY DISINTEGRATING ORAL AT BEDTIME
Qty: 0 | Refills: 0 | Status: DISCONTINUED | OUTPATIENT
Start: 2018-09-13 | End: 2018-09-14

## 2018-09-13 RX ADMIN — OLANZAPINE 10 MILLIGRAM(S): 15 TABLET, FILM COATED ORAL at 21:32

## 2018-09-13 RX ADMIN — AMLODIPINE BESYLATE 10 MILLIGRAM(S): 2.5 TABLET ORAL at 08:28

## 2018-09-13 RX ADMIN — PANTOPRAZOLE SODIUM 40 MILLIGRAM(S): 20 TABLET, DELAYED RELEASE ORAL at 08:28

## 2018-09-13 RX ADMIN — ATENOLOL 25 MILLIGRAM(S): 25 TABLET ORAL at 08:28

## 2018-09-13 RX ADMIN — Medication 1 APPLICATION(S): at 21:32

## 2018-09-13 RX ADMIN — CLOZAPINE 325 MILLIGRAM(S): 150 TABLET, ORALLY DISINTEGRATING ORAL at 21:32

## 2018-09-13 RX ADMIN — Medication 1000 UNIT(S): at 08:28

## 2018-09-13 RX ADMIN — Medication 1 APPLICATION(S): at 08:28

## 2018-09-13 RX ADMIN — Medication 50 MICROGRAM(S): at 08:28

## 2018-09-13 RX ADMIN — TAMSULOSIN HYDROCHLORIDE 0.4 MILLIGRAM(S): 0.4 CAPSULE ORAL at 21:32

## 2018-09-14 PROCEDURE — 99232 SBSQ HOSP IP/OBS MODERATE 35: CPT

## 2018-09-14 RX ORDER — CLOZAPINE 150 MG/1
350 TABLET, ORALLY DISINTEGRATING ORAL AT BEDTIME
Qty: 0 | Refills: 0 | Status: DISCONTINUED | OUTPATIENT
Start: 2018-09-14 | End: 2018-09-17

## 2018-09-14 RX ADMIN — PANTOPRAZOLE SODIUM 40 MILLIGRAM(S): 20 TABLET, DELAYED RELEASE ORAL at 07:52

## 2018-09-14 RX ADMIN — ATENOLOL 25 MILLIGRAM(S): 25 TABLET ORAL at 09:04

## 2018-09-14 RX ADMIN — Medication 1000 UNIT(S): at 09:04

## 2018-09-14 RX ADMIN — CLOZAPINE 350 MILLIGRAM(S): 150 TABLET, ORALLY DISINTEGRATING ORAL at 20:32

## 2018-09-14 RX ADMIN — OLANZAPINE 10 MILLIGRAM(S): 15 TABLET, FILM COATED ORAL at 20:32

## 2018-09-14 RX ADMIN — Medication 1 APPLICATION(S): at 20:32

## 2018-09-14 RX ADMIN — TAMSULOSIN HYDROCHLORIDE 0.4 MILLIGRAM(S): 0.4 CAPSULE ORAL at 20:32

## 2018-09-14 RX ADMIN — Medication 50 MICROGRAM(S): at 09:04

## 2018-09-14 RX ADMIN — AMLODIPINE BESYLATE 10 MILLIGRAM(S): 2.5 TABLET ORAL at 09:04

## 2018-09-15 RX ADMIN — ATENOLOL 25 MILLIGRAM(S): 25 TABLET ORAL at 09:00

## 2018-09-15 RX ADMIN — Medication 1000 UNIT(S): at 09:00

## 2018-09-15 RX ADMIN — Medication 50 MICROGRAM(S): at 09:00

## 2018-09-15 RX ADMIN — AMLODIPINE BESYLATE 10 MILLIGRAM(S): 2.5 TABLET ORAL at 09:00

## 2018-09-15 RX ADMIN — OLANZAPINE 10 MILLIGRAM(S): 15 TABLET, FILM COATED ORAL at 20:36

## 2018-09-15 RX ADMIN — TAMSULOSIN HYDROCHLORIDE 0.4 MILLIGRAM(S): 0.4 CAPSULE ORAL at 20:36

## 2018-09-15 RX ADMIN — PANTOPRAZOLE SODIUM 40 MILLIGRAM(S): 20 TABLET, DELAYED RELEASE ORAL at 07:41

## 2018-09-15 RX ADMIN — CLOZAPINE 350 MILLIGRAM(S): 150 TABLET, ORALLY DISINTEGRATING ORAL at 20:36

## 2018-09-16 RX ADMIN — PANTOPRAZOLE SODIUM 40 MILLIGRAM(S): 20 TABLET, DELAYED RELEASE ORAL at 10:13

## 2018-09-16 RX ADMIN — Medication 1 APPLICATION(S): at 20:49

## 2018-09-16 RX ADMIN — Medication 50 MICROGRAM(S): at 10:13

## 2018-09-16 RX ADMIN — Medication 1 APPLICATION(S): at 10:13

## 2018-09-16 RX ADMIN — AMLODIPINE BESYLATE 10 MILLIGRAM(S): 2.5 TABLET ORAL at 10:12

## 2018-09-16 RX ADMIN — TAMSULOSIN HYDROCHLORIDE 0.4 MILLIGRAM(S): 0.4 CAPSULE ORAL at 20:49

## 2018-09-16 RX ADMIN — ATENOLOL 25 MILLIGRAM(S): 25 TABLET ORAL at 10:12

## 2018-09-16 RX ADMIN — Medication 1000 UNIT(S): at 10:13

## 2018-09-16 RX ADMIN — CLOZAPINE 350 MILLIGRAM(S): 150 TABLET, ORALLY DISINTEGRATING ORAL at 20:49

## 2018-09-16 RX ADMIN — OLANZAPINE 10 MILLIGRAM(S): 15 TABLET, FILM COATED ORAL at 20:49

## 2018-09-17 LAB — CLOZAPINE SERPL-MCNC: SIGNIFICANT CHANGE UP

## 2018-09-17 PROCEDURE — 99232 SBSQ HOSP IP/OBS MODERATE 35: CPT

## 2018-09-17 RX ORDER — CLOZAPINE 150 MG/1
375 TABLET, ORALLY DISINTEGRATING ORAL AT BEDTIME
Qty: 0 | Refills: 0 | Status: DISCONTINUED | OUTPATIENT
Start: 2018-09-17 | End: 2018-09-18

## 2018-09-17 RX ADMIN — PANTOPRAZOLE SODIUM 40 MILLIGRAM(S): 20 TABLET, DELAYED RELEASE ORAL at 07:05

## 2018-09-17 RX ADMIN — Medication 1 APPLICATION(S): at 20:26

## 2018-09-17 RX ADMIN — OLANZAPINE 10 MILLIGRAM(S): 15 TABLET, FILM COATED ORAL at 20:26

## 2018-09-17 RX ADMIN — Medication 50 MICROGRAM(S): at 08:43

## 2018-09-17 RX ADMIN — ATENOLOL 25 MILLIGRAM(S): 25 TABLET ORAL at 08:42

## 2018-09-17 RX ADMIN — Medication 1000 UNIT(S): at 08:43

## 2018-09-17 RX ADMIN — AMLODIPINE BESYLATE 10 MILLIGRAM(S): 2.5 TABLET ORAL at 08:42

## 2018-09-17 RX ADMIN — TAMSULOSIN HYDROCHLORIDE 0.4 MILLIGRAM(S): 0.4 CAPSULE ORAL at 20:26

## 2018-09-17 RX ADMIN — CLOZAPINE 375 MILLIGRAM(S): 150 TABLET, ORALLY DISINTEGRATING ORAL at 20:26

## 2018-09-18 VITALS — DIASTOLIC BLOOD PRESSURE: 62 MMHG | TEMPERATURE: 98 F | HEART RATE: 77 BPM | SYSTOLIC BLOOD PRESSURE: 114 MMHG

## 2018-09-18 LAB
BASOPHILS # BLD AUTO: 0.03 K/UL — SIGNIFICANT CHANGE UP (ref 0–0.2)
BASOPHILS NFR BLD AUTO: 0.3 % — SIGNIFICANT CHANGE UP (ref 0–2)
EOSINOPHIL # BLD AUTO: 0.36 K/UL — SIGNIFICANT CHANGE UP (ref 0–0.5)
EOSINOPHIL NFR BLD AUTO: 3.3 % — SIGNIFICANT CHANGE UP (ref 0–6)
HCT VFR BLD CALC: 48.9 % — SIGNIFICANT CHANGE UP (ref 39–50)
HGB BLD-MCNC: 15.8 G/DL — SIGNIFICANT CHANGE UP (ref 13–17)
IMM GRANULOCYTES # BLD AUTO: 0.03 # — SIGNIFICANT CHANGE UP
IMM GRANULOCYTES NFR BLD AUTO: 0.3 % — SIGNIFICANT CHANGE UP (ref 0–1.5)
LYMPHOCYTES # BLD AUTO: 3.62 K/UL — HIGH (ref 1–3.3)
LYMPHOCYTES # BLD AUTO: 33.2 % — SIGNIFICANT CHANGE UP (ref 13–44)
MCHC RBC-ENTMCNC: 29.5 PG — SIGNIFICANT CHANGE UP (ref 27–34)
MCHC RBC-ENTMCNC: 32.3 % — SIGNIFICANT CHANGE UP (ref 32–36)
MCV RBC AUTO: 91.2 FL — SIGNIFICANT CHANGE UP (ref 80–100)
MONOCYTES # BLD AUTO: 0.67 K/UL — SIGNIFICANT CHANGE UP (ref 0–0.9)
MONOCYTES NFR BLD AUTO: 6.1 % — SIGNIFICANT CHANGE UP (ref 2–14)
NEUTROPHILS # BLD AUTO: 6.2 K/UL — SIGNIFICANT CHANGE UP (ref 1.8–7.4)
NEUTROPHILS NFR BLD AUTO: 56.8 % — SIGNIFICANT CHANGE UP (ref 43–77)
NRBC # FLD: 0 — SIGNIFICANT CHANGE UP
PLATELET # BLD AUTO: 327 K/UL — SIGNIFICANT CHANGE UP (ref 150–400)
PMV BLD: 10 FL — SIGNIFICANT CHANGE UP (ref 7–13)
RBC # BLD: 5.36 M/UL — SIGNIFICANT CHANGE UP (ref 4.2–5.8)
RBC # FLD: 13.6 % — SIGNIFICANT CHANGE UP (ref 10.3–14.5)
WBC # BLD: 10.91 K/UL — HIGH (ref 3.8–10.5)
WBC # FLD AUTO: 10.91 K/UL — HIGH (ref 3.8–10.5)

## 2018-09-18 PROCEDURE — 99239 HOSP IP/OBS DSCHRG MGMT >30: CPT

## 2018-09-18 RX ORDER — CLOZAPINE 150 MG/1
3 TABLET, ORALLY DISINTEGRATING ORAL
Qty: 21 | Refills: 0 | OUTPATIENT
Start: 2018-09-18 | End: 2018-09-24

## 2018-09-18 RX ORDER — TAMSULOSIN HYDROCHLORIDE 0.4 MG/1
1 CAPSULE ORAL
Qty: 15 | Refills: 0 | OUTPATIENT
Start: 2018-09-18 | End: 2018-10-02

## 2018-09-18 RX ORDER — ALENDRONATE SODIUM 70 MG/1
1 TABLET ORAL
Qty: 0 | Refills: 0 | COMMUNITY

## 2018-09-18 RX ORDER — PANTOPRAZOLE SODIUM 20 MG/1
1 TABLET, DELAYED RELEASE ORAL
Qty: 15 | Refills: 0 | OUTPATIENT
Start: 2018-09-18 | End: 2018-10-02

## 2018-09-18 RX ORDER — CHOLECALCIFEROL (VITAMIN D3) 125 MCG
1 CAPSULE ORAL
Qty: 0 | Refills: 0 | COMMUNITY

## 2018-09-18 RX ORDER — OMEPRAZOLE 10 MG/1
1 CAPSULE, DELAYED RELEASE ORAL
Qty: 0 | Refills: 0 | COMMUNITY

## 2018-09-18 RX ORDER — LEVOTHYROXINE SODIUM 125 MCG
1 TABLET ORAL
Qty: 0 | Refills: 0 | COMMUNITY

## 2018-09-18 RX ORDER — ACETAMINOPHEN 500 MG
2 TABLET ORAL
Qty: 0 | Refills: 0 | COMMUNITY

## 2018-09-18 RX ORDER — CHOLECALCIFEROL (VITAMIN D3) 125 MCG
1 CAPSULE ORAL
Qty: 15 | Refills: 0 | OUTPATIENT
Start: 2018-09-18 | End: 2018-10-02

## 2018-09-18 RX ORDER — OLANZAPINE 15 MG/1
1 TABLET, FILM COATED ORAL
Qty: 15 | Refills: 0 | OUTPATIENT
Start: 2018-09-18 | End: 2018-10-02

## 2018-09-18 RX ORDER — LEVOTHYROXINE SODIUM 125 MCG
1 TABLET ORAL
Qty: 15 | Refills: 0 | OUTPATIENT
Start: 2018-09-18 | End: 2018-10-02

## 2018-09-18 RX ORDER — AMLODIPINE BESYLATE 2.5 MG/1
1 TABLET ORAL
Qty: 15 | Refills: 0 | OUTPATIENT
Start: 2018-09-18 | End: 2018-10-02

## 2018-09-18 RX ADMIN — AMLODIPINE BESYLATE 10 MILLIGRAM(S): 2.5 TABLET ORAL at 08:43

## 2018-09-18 RX ADMIN — Medication 1000 UNIT(S): at 08:43

## 2018-09-18 RX ADMIN — ATENOLOL 25 MILLIGRAM(S): 25 TABLET ORAL at 08:43

## 2018-09-18 RX ADMIN — Medication 50 MICROGRAM(S): at 08:43

## 2018-09-18 RX ADMIN — PANTOPRAZOLE SODIUM 40 MILLIGRAM(S): 20 TABLET, DELAYED RELEASE ORAL at 08:43

## 2020-10-07 NOTE — PROGRESS NOTE BEHAVIORAL HEALTH - SUMMARY
Introductory lactation consult with Ronal and Cooper and baby girl Willi. Breastfeeding has been going fairly well since delivery. Ronal stated that occasionally Willi struggles to get deep enough latch.  Reassurance and guidance given.  Writer observed feeding, initially Ronal placed in cradle hold and Willi still swaddled.  Writer assisted with unwrapping Willi and bringing closer to body.  With transition to cross cradle hold and support of breast tissue, Willi latched appropriately with good swallows present.  Praise given.  Discussed typical expectations and recommendations, answering questions. See Infant education record.  Provided Lactation Support Services contact information. Encouraged to call with questions, concerns or for assistance with feedings as needed or desired.      Daly Martini RN, IBCLC    Total time spent in 1:1 consultation: 20 minutes.   Pt is a 54 y/o  man, single non caregiver, PPhx Schizoaffective disorder as per Psyckes, no reported h/o of suicidality or violence or legal trouble, multiple prior hospitalizations (per records, most recently at [facility] 8T 6/13-4/14, discharged to [facility]), PMH of htn, BPH, fibromyalgia, BIB EMS picked up from home after neighbors called 911 due to odor coming from the pt's residence after EMS reports pt residence was filled with garbage, mold, live rats walking in residence, what appeared to be fecal matter thrown against the walls.      On exam, pt remains disorganized and psychotic.  He lacks insight and and judgment into illness and remains an acute safety risk and warrants inpatient psychiatric hospitalization for safety and stabilization. Pt is a 56 y/o  man, single non caregiver, PPhx Schizoaffective disorder as per Psyckes, no reported h/o of suicidality or violence or legal trouble, multiple prior hospitalizations (per records, most recently at [facility] 8T 6/13-4/14, discharged to [facility]), PMH of htn, BPH, fibromyalgia, BIB EMS picked up from home after neighbors called 911 due to odor coming from the pt's residence after EMS reports pt residence was filled with garbage, mold, live rats walking in residence, what appeared to be fecal matter thrown against the walls.      On exam, pt remains disorganized and psychotic.  He lacks insight and and judgment into illness and remains an acute safety risk and warrants inpatient psychiatric hospitalization for safety and stabilization.  2PC in chart, needs to be completed by medicine attending.

## 2020-10-21 NOTE — H&P ADULT - MS GEN HX ROS MEA POS PC
Left message for patient to call back pod 1 hotline([phone])   arthralgia/Chronic joint pain from OA

## 2021-04-05 NOTE — ED BEHAVIORAL HEALTH ASSESSMENT NOTE - ATTENTION / CONCENTRATION
DARIUS CARCAMOIST  History and Physical     [facility] Patient Status:  Inpatient    1971 MRN [id number]   [facility] 4SW-A Attending Stella Gibson MD   Hosp Day # 0 PCP No primary care provider on file.      Chief Complaint: SO S1, S2. Regular rate and rhythm. No murmurs, rubs or gallops. Equal pulses. Chest and Back: No tenderness or deformity. Abdomen: Soft, nontender, nondistended. Positive bowel sounds. No rebound, guarding or organomegaly.   Neurologic: No focal neurologi monitor  7. Elevated troponin/NSTEMI, likely due to #2, needs ischemic w/u, defer to cardiology  8.  Transaminitis, likely due to hepatic congestion, monitor    Quality:  · DVT Prophylaxis: lovenox  · CODE status: Full  · Hernandez: no  · If COVID testing is ne Other

## 2021-07-30 NOTE — CONSULT NOTE ADULT - ASSESSMENT
58 year old man PMH schizoaffective disorder, HTN, hypothyroidism who presents with asymptomatic rash of unknown duration. Hydroxychloroquine Pregnancy And Lactation Text: This medication has been shown to cause fetal harm but it isn't assigned a Pregnancy Risk Category. There are small amounts excreted in breast milk.

## 2022-12-14 NOTE — PATIENT PROFILE ADULT. - NS PRO MODE OF ARRIVAL
TAWANDA Smith  called back. Did provide her with Dr. Debora Bright update. Sarah NEFF calling for recent  results and request to fax copy to [phone]. Still showing as preliminary status 18 hours. (as of 12/13/22)    Final is not available yet. stretcher

## 2023-07-26 NOTE — DISCHARGE NOTE ADULT - PROVIDER RX CONTACT NUMBER
.Refill Request     CONFIRM preferred pharmacy with the patient. If Mail Order Rx - Pend for 90 day refill. Last Seen: Last Seen Department: 1/19/2023  Last Seen by PCP: 1/19/2023    Last Written: 1-30-23 45 with 1     If no future appointment scheduled:  Review the last OV with PCP and review information for follow-up visit,  Route STAFF MESSAGE with patient name to the [facility] for scheduling with the following information:            -  Timing of next visit           -  Visit type ie Physical, OV, etc           -  Diagnoses/Reason ie. COPD, HTN - Do not use MEDICATION, Follow-up or CHECK UP - Give reason for visit      Next Appointment:   No future appointments. Message sent to [address] [place] [address] to schedule appt with patient?   YES      Requested Prescriptions     Pending Prescriptions Disp Refills    citalopram (CELEXA) 40 MG tablet [Pharmacy Med Name: CITALOPRAM HBR 40 MG TABLET] 15 tablet 3     Sig: TAKE 1/2 TABLET BY MOUTH DAILY
[phone]

## 2024-04-22 NOTE — H&P ADULT - WEIGHT IN LBS
Dr. Lerma ordered  
Provided pt with Dr. Lerma office information via phone call and pt portal message 
Pt called because he has been trying to get a hold of Dr Watson office, he has left several messages and has sent emails and has gotten no response , pt would like to be referred else where. Please advise   
[phone]

## 2024-05-11 NOTE — ED ADULT TRIAGE NOTE - AS TEMP SITE
Please lay off of the alcohol and consult with your primary care physician for further care recommendations for chronic alcoholic  Return to ER as needed   oral

## 2025-01-30 NOTE — DISCHARGE NOTE ADULT - ABILITY TO HEAR (WITH HEARING AID OR HEARING APPLIANCE IF NORMALLY USED):
1:1 feed for physical assistance and supervision/frequent cues/help required
Adequate: hears normal conversation without difficulty
